# Patient Record
Sex: MALE | Race: BLACK OR AFRICAN AMERICAN | Employment: FULL TIME | URBAN - METROPOLITAN AREA
[De-identification: names, ages, dates, MRNs, and addresses within clinical notes are randomized per-mention and may not be internally consistent; named-entity substitution may affect disease eponyms.]

---

## 2019-03-25 ENCOUNTER — HOSPITAL ENCOUNTER (EMERGENCY)
Age: 49
Discharge: HOME | End: 2019-03-26
Payer: COMMERCIAL

## 2019-03-25 DIAGNOSIS — F10.920 ALCOHOLIC INTOXICATION WITHOUT COMPLICATION (HCC): Primary | ICD-10-CM

## 2019-03-25 LAB
CHP ED QC CHECK: YES
GLUCOSE BLD-MCNC: 84 MG/DL
GLUCOSE BLD-MCNC: 84 MG/DL (ref 70–99)
PERFORMED ON: NORMAL

## 2019-03-25 PROCEDURE — 99284 EMERGENCY DEPT VISIT MOD MDM: CPT

## 2019-03-26 VITALS
DIASTOLIC BLOOD PRESSURE: 102 MMHG | OXYGEN SATURATION: 97 % | HEART RATE: 84 BPM | RESPIRATION RATE: 16 BRPM | SYSTOLIC BLOOD PRESSURE: 140 MMHG | TEMPERATURE: 97.6 F

## 2020-06-08 ENCOUNTER — HOSPITAL ENCOUNTER (EMERGENCY)
Age: 50
Discharge: HOME | End: 2020-06-08
Attending: EMERGENCY MEDICINE
Payer: COMMERCIAL

## 2020-06-08 VITALS
DIASTOLIC BLOOD PRESSURE: 103 MMHG | OXYGEN SATURATION: 97 % | WEIGHT: 188.49 LBS | HEIGHT: 69 IN | HEART RATE: 81 BPM | RESPIRATION RATE: 18 BRPM | TEMPERATURE: 99.5 F | BODY MASS INDEX: 27.92 KG/M2 | SYSTOLIC BLOOD PRESSURE: 167 MMHG

## 2020-06-08 LAB
A/G RATIO: 1.1 (ref 1.1–2.2)
ALBUMIN SERPL-MCNC: 3.8 G/DL (ref 3.4–5)
ALP BLD-CCNC: 63 U/L (ref 40–129)
ALT SERPL-CCNC: 65 U/L (ref 10–40)
ANION GAP SERPL CALCULATED.3IONS-SCNC: 10 MMOL/L (ref 3–16)
AST SERPL-CCNC: 42 U/L (ref 15–37)
BASOPHILS ABSOLUTE: 0 K/UL (ref 0–0.2)
BASOPHILS RELATIVE PERCENT: 0 %
BILIRUB SERPL-MCNC: 0.5 MG/DL (ref 0–1)
BUN BLDV-MCNC: 13 MG/DL (ref 7–20)
CALCIUM SERPL-MCNC: 9.2 MG/DL (ref 8.3–10.6)
CHLORIDE BLD-SCNC: 107 MMOL/L (ref 99–110)
CO2: 26 MMOL/L (ref 21–32)
CREAT SERPL-MCNC: 0.7 MG/DL (ref 0.9–1.3)
EOSINOPHILS ABSOLUTE: 0 K/UL (ref 0–0.6)
EOSINOPHILS RELATIVE PERCENT: 1 %
GFR AFRICAN AMERICAN: >60
GFR NON-AFRICAN AMERICAN: >60
GLOBULIN: 3.4 G/DL
GLUCOSE BLD-MCNC: 94 MG/DL (ref 70–99)
HCT VFR BLD CALC: 40.2 % (ref 40.5–52.5)
HEMOGLOBIN: 13.8 G/DL (ref 13.5–17.5)
LACTIC ACID: 1 MMOL/L (ref 0.4–2)
LIPASE: 28 U/L (ref 13–60)
LYMPHOCYTES ABSOLUTE: 1.9 K/UL (ref 1–5.1)
LYMPHOCYTES RELATIVE PERCENT: 48 %
MCH RBC QN AUTO: 32.9 PG (ref 26–34)
MCHC RBC AUTO-ENTMCNC: 34.4 G/DL (ref 31–36)
MCV RBC AUTO: 95.8 FL (ref 80–100)
MONOCYTES ABSOLUTE: 0.4 K/UL (ref 0–1.3)
MONOCYTES RELATIVE PERCENT: 10 %
NEUTROPHILS ABSOLUTE: 1.6 K/UL (ref 1.7–7.7)
NEUTROPHILS RELATIVE PERCENT: 41 %
PDW BLD-RTO: 14.3 % (ref 12.4–15.4)
PLATELET # BLD: 131 K/UL (ref 135–450)
PLATELET SLIDE REVIEW: ADEQUATE
PMV BLD AUTO: 8.5 FL (ref 5–10.5)
POTASSIUM REFLEX MAGNESIUM: 4.3 MMOL/L (ref 3.5–5.1)
RBC # BLD: 4.2 M/UL (ref 4.2–5.9)
RBC # BLD: NORMAL 10*6/UL
SLIDE REVIEW: ABNORMAL
SODIUM BLD-SCNC: 143 MMOL/L (ref 136–145)
TOTAL PROTEIN: 7.2 G/DL (ref 6.4–8.2)
TROPONIN: <0.01 NG/ML
WBC # BLD: 3.9 K/UL (ref 4–11)

## 2020-06-08 PROCEDURE — 2580000003 HC RX 258: Performed by: EMERGENCY MEDICINE

## 2020-06-08 PROCEDURE — 93005 ELECTROCARDIOGRAM TRACING: CPT | Performed by: EMERGENCY MEDICINE

## 2020-06-08 PROCEDURE — 6360000002 HC RX W HCPCS: Performed by: EMERGENCY MEDICINE

## 2020-06-08 PROCEDURE — 96375 TX/PRO/DX INJ NEW DRUG ADDON: CPT

## 2020-06-08 PROCEDURE — 83605 ASSAY OF LACTIC ACID: CPT

## 2020-06-08 PROCEDURE — 80053 COMPREHEN METABOLIC PANEL: CPT

## 2020-06-08 PROCEDURE — 84484 ASSAY OF TROPONIN QUANT: CPT

## 2020-06-08 PROCEDURE — 85025 COMPLETE CBC W/AUTO DIFF WBC: CPT

## 2020-06-08 PROCEDURE — 2500000003 HC RX 250 WO HCPCS: Performed by: EMERGENCY MEDICINE

## 2020-06-08 PROCEDURE — 96374 THER/PROPH/DIAG INJ IV PUSH: CPT

## 2020-06-08 PROCEDURE — 99284 EMERGENCY DEPT VISIT MOD MDM: CPT

## 2020-06-08 PROCEDURE — 36415 COLL VENOUS BLD VENIPUNCTURE: CPT

## 2020-06-08 PROCEDURE — 83690 ASSAY OF LIPASE: CPT

## 2020-06-08 RX ORDER — ONDANSETRON 2 MG/ML
4 INJECTION INTRAMUSCULAR; INTRAVENOUS ONCE
Status: COMPLETED | OUTPATIENT
Start: 2020-06-08 | End: 2020-06-08

## 2020-06-08 RX ORDER — ONDANSETRON 4 MG/1
4-8 TABLET, ORALLY DISINTEGRATING ORAL EVERY 12 HOURS PRN
Qty: 12 TABLET | Refills: 0 | Status: SHIPPED | OUTPATIENT
Start: 2020-06-08 | End: 2021-03-30

## 2020-06-08 RX ORDER — 0.9 % SODIUM CHLORIDE 0.9 %
1000 INTRAVENOUS SOLUTION INTRAVENOUS ONCE
Status: COMPLETED | OUTPATIENT
Start: 2020-06-08 | End: 2020-06-08

## 2020-06-08 RX ORDER — DICYCLOMINE HYDROCHLORIDE 10 MG/1
10 CAPSULE ORAL EVERY 6 HOURS PRN
Qty: 20 CAPSULE | Refills: 0 | Status: SHIPPED | OUTPATIENT
Start: 2020-06-08 | End: 2021-03-30

## 2020-06-08 RX ORDER — FAMOTIDINE 20 MG/1
20 TABLET, FILM COATED ORAL 2 TIMES DAILY
Qty: 60 TABLET | Refills: 0 | Status: SHIPPED | OUTPATIENT
Start: 2020-06-08 | End: 2021-03-30

## 2020-06-08 RX ADMIN — ONDANSETRON 4 MG: 2 INJECTION, SOLUTION INTRAMUSCULAR; INTRAVENOUS at 17:44

## 2020-06-08 RX ADMIN — FAMOTIDINE 20 MG: 10 INJECTION, SOLUTION INTRAVENOUS at 17:43

## 2020-06-08 RX ADMIN — SODIUM CHLORIDE 1000 ML: 9 INJECTION, SOLUTION INTRAVENOUS at 17:43

## 2020-06-08 ASSESSMENT — PAIN DESCRIPTION - LOCATION
LOCATION: RIB CAGE;ABDOMEN
LOCATION: ABDOMEN;RIB CAGE
LOCATION: ABDOMEN;RIB CAGE

## 2020-06-08 ASSESSMENT — PAIN DESCRIPTION - ORIENTATION: ORIENTATION: UPPER

## 2020-06-08 ASSESSMENT — ENCOUNTER SYMPTOMS
SHORTNESS OF BREATH: 0
VOMITING: 1
COUGH: 0
NAUSEA: 1
EYES NEGATIVE: 1
CONSTIPATION: 0
ABDOMINAL PAIN: 1
DIARRHEA: 1
RESPIRATORY NEGATIVE: 1
BLOOD IN STOOL: 0

## 2020-06-08 ASSESSMENT — PAIN SCALES - GENERAL
PAINLEVEL_OUTOF10: 5
PAINLEVEL_OUTOF10: 3
PAINLEVEL_OUTOF10: 3

## 2020-06-08 ASSESSMENT — PAIN - FUNCTIONAL ASSESSMENT: PAIN_FUNCTIONAL_ASSESSMENT: 0-10

## 2020-06-08 ASSESSMENT — PAIN DESCRIPTION - PAIN TYPE
TYPE: ACUTE PAIN

## 2020-06-08 ASSESSMENT — PAIN DESCRIPTION - DESCRIPTORS: DESCRIPTORS: TENDER

## 2020-06-08 NOTE — ED PROVIDER NOTES
[facility] ENCOUNTER        Pt Name: Cherelle Ludwig  MRN: [id number]  [place] 1970  Date of evaluation: 6/8/2020  Provider: Lane Foreman MD  PCP: No primary care provider on file. CHIEF COMPLAINT       Chief Complaint   Patient presents with    Abdominal Pain     upper abd pain 4/10 x 5days, with diarrhea, nausea, emesis x1 2days ago. hx diverticulitis. HISTORY OFPRESENT ILLNESS   (Location/Symptom, Timing/Onset, Context/Setting, Quality, Duration, Modifying Factors,Severity)  Note limiting factors. Cherelle Ludwig is a 53 y.o. male with history of hypertension, hyperlipidemia, prior episode of diverticulitis, with around 5 days of epigastric discomfort, currently 5 out of 10 with no radiation, associated with nausea and several episodes of vomiting, though no more than 1/day, along with loose stools, several episodes of diarrhea. Vomiting and diarrhea all nonbloody. Prior episodes of diverticulitis cause pain in his lower abdomen, currently more upper abdomen and epigastrium    Denies any urinary symptoms. Has been drinking alcohol more heavily than usual during the quarantine, around 4 of the 12 ounce beers daily. Nursing Notes were all reviewed and agreed with or any disagreements were addressed  in the HPI. REVIEW OF SYSTEMS    (2-9 systems for level 4, 10 or more for level 5)     Review of Systems   Constitutional: Negative for activity change, appetite change, chills and fever. HENT: Negative. Eyes: Negative. Respiratory: Negative. Negative for cough and shortness of breath. Cardiovascular: Negative. Negative for chest pain. Gastrointestinal: Positive for abdominal pain, diarrhea, nausea and vomiting. Negative for blood in stool and constipation. Genitourinary: Negative. Negative for dysuria and flank pain. Musculoskeletal: Negative. Skin: Negative. Neurological: Negative.   Negative for None       SCREENINGS             PHYSICAL EXAM    (up to 7 for level 4, 8 or more for level 5)     ED Triage Vitals [06/08/20 1653]   BP Temp Temp Source Pulse Resp SpO2 Height Weight   (!) 167/103 99.5 °F (37.5 °C) Oral 81 18 97 % 5' 9\" (1.753 m) 188 lb 7.9 oz (85.5 kg)      height is 5' 9\" (1.753 m) and weight is 188 lb 7.9 oz (85.5 kg). His oral temperature is 99.5 °F (37.5 °C). His blood pressure is 167/103 (abnormal) and his pulse is 81. His respiration is 18 and oxygen saturation is 97%. Physical Exam  Constitutional: Appears well-developed and well-nourished. No distress. HENT:   Head: Normocephalic and atraumatic. Eyes: Conjunctivae normal.   Neck: Neck supple. Cardiovascular: Normal rate and intact distal pulses. Extremities warm and well perfused. Cap refill less than 2 seconds. Pulmonary/Chest: Lungs clear to auscultation. Effort normal. No respiratory distress. Speaking full sentences. Abdominal: Nontender, exhibits no distension. Soft. No guarding or rebound. Neurological: Alert. Answering questions appropriately. Ambulatory without ataxia. Musculoskeletal: No lower extremity edema. No grossly visible skin lesions or other deformities. Skin: Skin is warm and dry. No jaundice. Psychiatric: Normal mood and affect. Behavior is normal.   Nursing note and vitals reviewed. DIAGNOSTIC RESULTS   LABS:    Results for orders placed or performed during the hospital encounter of 06/08/20   CBC Auto Differential   Result Value Ref Range    WBC 3.9 (L) 4.0 - 11.0 K/uL    RBC 4.20 4. 20 - 5.90 M/uL    Hemoglobin 13.8 13.5 - 17.5 g/dL    Hematocrit 40.2 (L) 40.5 - 52.5 %    MCV 95.8 80.0 - 100.0 fL    MCH 32.9 26.0 - 34.0 pg    MCHC 34.4 31.0 - 36.0 g/dL    RDW 14.3 12.4 - 15.4 %    Platelets 166 (L) 117 - 450 K/uL    MPV 8.5 5.0 - 10.5 fL    PLATELET SLIDE REVIEW Adequate     SLIDE REVIEW see below     Neutrophils % 41.0 %    Lymphocytes % 48.0 %    Monocytes % 10.0 %    Eosinophils % 06/08/20 1653   BP: (!) 167/103   Pulse: 81   Resp: 18   Temp: 99.5 °F (37.5 °C)   TempSrc: Oral   SpO2: 97%   Weight: 188 lb 7.9 oz (85.5 kg)   Height: 5' 9\" (1.753 m)       Patient was given the following medications:  Medications   0.9 % sodium chloride bolus (1,000 mLs Intravenous New Bag 6/8/20 1743)   ondansetron (ZOFRAN) injection 4 mg (4 mg Intravenous Given 6/8/20 1744)   famotidine (PEPCID) injection 20 mg (20 mg Intravenous Given 6/8/20 1743)            56-year-old male with epigastric discomfort, frequent alcohol use lately. He has had some nausea, vomiting, diarrhea associated. Nontender abdomen, soft, no guarding or rebound. Hypertensive but otherwise normal vitals. Labs remarkable for slight LFT elevations, ALT more than AST but normal bilirubin, normal lipase, normal electrolytes and renal function. Do not feel that imaging is indicated at this time. No evidence of pancreatitis. Suspect gastritis secondary to his alcohol consumption is most likely etiology. He was feeling significantly improved after Zofran, Pepcid here. Will recommend follow-up with PCP after this visit for further evaluation, give return precautions for intractable pain, fevers, inability to tolerate p.o. FINAL IMPRESSION      1. Abdominal pain, epigastric    2. Non-intractable vomiting with nausea, unspecified vomiting type    3. Diarrhea, unspecified type          DISPOSITION/PLAN   DISPOSITION Decision To Discharge 06/08/2020 06:29:35 PM      PATIENT REFERRED TO:  [facility]) Pre-Services  [phone]          DISCHARGE MEDICATIONS:  New Prescriptions    DICYCLOMINE (BENTYL) 10 MG CAPSULE    Take 1 capsule by mouth every 6 hours as needed (cramps)    FAMOTIDINE (PEPCID) 20 MG TABLET    Take 1 tablet by mouth 2 times daily    ONDANSETRON (ZOFRAN ODT) 4 MG DISINTEGRATING TABLET    Take 1-2 tablets by mouth every 12 hours as needed for Nausea May Sub regular tablet (non-ODT) if insurance does not cover ODT.

## 2020-06-09 LAB
EKG ATRIAL RATE: 75 BPM
EKG DIAGNOSIS: NORMAL
EKG P AXIS: 26 DEGREES
EKG P-R INTERVAL: 166 MS
EKG Q-T INTERVAL: 392 MS
EKG QRS DURATION: 102 MS
EKG QTC CALCULATION (BAZETT): 437 MS
EKG R AXIS: -31 DEGREES
EKG T AXIS: 6 DEGREES
EKG VENTRICULAR RATE: 75 BPM

## 2020-06-09 PROCEDURE — 93010 ELECTROCARDIOGRAM REPORT: CPT | Performed by: INTERNAL MEDICINE

## 2021-03-30 ENCOUNTER — HOSPITAL ENCOUNTER (INPATIENT)
Age: 51
LOS: 3 days | Discharge: HOME | DRG: 897 | End: 2021-04-02
Attending: STUDENT IN AN ORGANIZED HEALTH CARE EDUCATION/TRAINING PROGRAM | Admitting: INTERNAL MEDICINE
Payer: COMMERCIAL

## 2021-03-30 DIAGNOSIS — M25.461 EFFUSION OF BURSA OF RIGHT KNEE: ICD-10-CM

## 2021-03-30 DIAGNOSIS — R74.8 ELEVATED LIVER ENZYMES: ICD-10-CM

## 2021-03-30 DIAGNOSIS — F10.10 ALCOHOL ABUSE: Primary | ICD-10-CM

## 2021-03-30 DIAGNOSIS — F10.929 ACUTE ALCOHOLIC INTOXICATION WITH COMPLICATION (HCC): ICD-10-CM

## 2021-03-30 PROBLEM — F10.930 ALCOHOL WITHDRAWAL, UNCOMPLICATED (HCC): Status: ACTIVE | Noted: 2021-03-30

## 2021-03-30 LAB
A/G RATIO: 1 (ref 1.1–2.2)
ACETAMINOPHEN LEVEL: <5 UG/ML (ref 10–30)
ALBUMIN SERPL-MCNC: 4.2 G/DL (ref 3.4–5)
ALP BLD-CCNC: 75 U/L (ref 40–129)
ALT SERPL-CCNC: 277 U/L (ref 10–40)
AMPHETAMINE SCREEN, URINE: NORMAL
ANION GAP SERPL CALCULATED.3IONS-SCNC: 12 MMOL/L (ref 3–16)
AST SERPL-CCNC: 254 U/L (ref 15–37)
BARBITURATE SCREEN URINE: NORMAL
BASOPHILS ABSOLUTE: 0 K/UL (ref 0–0.2)
BASOPHILS RELATIVE PERCENT: 0.9 %
BENZODIAZEPINE SCREEN, URINE: NORMAL
BILIRUB SERPL-MCNC: 0.5 MG/DL (ref 0–1)
BILIRUBIN URINE: NEGATIVE
BLOOD, URINE: NEGATIVE
BUN BLDV-MCNC: 4 MG/DL (ref 7–20)
CALCIUM SERPL-MCNC: 9.3 MG/DL (ref 8.3–10.6)
CANNABINOID SCREEN URINE: NORMAL
CHLORIDE BLD-SCNC: 103 MMOL/L (ref 99–110)
CLARITY: CLEAR
CO2: 28 MMOL/L (ref 21–32)
COCAINE METABOLITE SCREEN URINE: NORMAL
COLOR: YELLOW
CREAT SERPL-MCNC: 0.7 MG/DL (ref 0.9–1.3)
EOSINOPHILS ABSOLUTE: 0.1 K/UL (ref 0–0.6)
EOSINOPHILS RELATIVE PERCENT: 2.2 %
ETHANOL: 298 MG/DL (ref 0–0.08)
FOLATE: 9.77 NG/ML (ref 4.78–24.2)
GFR AFRICAN AMERICAN: >60
GFR NON-AFRICAN AMERICAN: >60
GLOBULIN: 4.1 G/DL
GLUCOSE BLD-MCNC: 93 MG/DL (ref 70–99)
GLUCOSE URINE: NEGATIVE MG/DL
HAV IGM SER IA-ACNC: NORMAL
HCT VFR BLD CALC: 44.4 % (ref 40.5–52.5)
HEMOGLOBIN: 15 G/DL (ref 13.5–17.5)
HEPATITIS B CORE IGM ANTIBODY: NORMAL
HEPATITIS B SURFACE ANTIGEN INTERPRETATION: NORMAL
HEPATITIS C ANTIBODY INTERPRETATION: NORMAL
INR BLD: 1.08 (ref 0.86–1.14)
KETONES, URINE: NEGATIVE MG/DL
LEUKOCYTE ESTERASE, URINE: NEGATIVE
LYMPHOCYTES ABSOLUTE: 1.1 K/UL (ref 1–5.1)
LYMPHOCYTES RELATIVE PERCENT: 44.3 %
Lab: NORMAL
MCH RBC QN AUTO: 32.4 PG (ref 26–34)
MCHC RBC AUTO-ENTMCNC: 33.8 G/DL (ref 31–36)
MCV RBC AUTO: 95.9 FL (ref 80–100)
METHADONE SCREEN, URINE: NORMAL
MICROSCOPIC EXAMINATION: NORMAL
MONOCYTES ABSOLUTE: 0.3 K/UL (ref 0–1.3)
MONOCYTES RELATIVE PERCENT: 13.1 %
NEUTROPHILS ABSOLUTE: 1 K/UL (ref 1.7–7.7)
NEUTROPHILS RELATIVE PERCENT: 39.5 %
NITRITE, URINE: NEGATIVE
OPIATE SCREEN URINE: NORMAL
OXYCODONE URINE: NORMAL
PDW BLD-RTO: 15.1 % (ref 12.4–15.4)
PH UA: 6
PH UA: 6 (ref 5–8)
PHENCYCLIDINE SCREEN URINE: NORMAL
PLATELET # BLD: 89 K/UL (ref 135–450)
PLATELET SLIDE REVIEW: ABNORMAL
PMV BLD AUTO: 7.5 FL (ref 5–10.5)
POTASSIUM REFLEX MAGNESIUM: 4.2 MMOL/L (ref 3.5–5.1)
PROPOXYPHENE SCREEN: NORMAL
PROTEIN UA: NEGATIVE MG/DL
PROTHROMBIN TIME: 12.5 SEC (ref 10–13.2)
RBC # BLD: 4.63 M/UL (ref 4.2–5.9)
SALICYLATE, SERUM: <0.3 MG/DL (ref 15–30)
SARS-COV-2, NAAT: NOT DETECTED
SLIDE REVIEW: ABNORMAL
SODIUM BLD-SCNC: 143 MMOL/L (ref 136–145)
SPECIFIC GRAVITY UA: 1.01 (ref 1–1.03)
TOTAL PROTEIN: 8.3 G/DL (ref 6.4–8.2)
URINE REFLEX TO CULTURE: NORMAL
URINE TYPE: NORMAL
UROBILINOGEN, URINE: 0.2 E.U./DL
VITAMIN B-12: 613 PG/ML (ref 211–911)
WBC # BLD: 2.5 K/UL (ref 4–11)

## 2021-03-30 PROCEDURE — 85610 PROTHROMBIN TIME: CPT

## 2021-03-30 PROCEDURE — 6360000002 HC RX W HCPCS: Performed by: PHYSICIAN ASSISTANT

## 2021-03-30 PROCEDURE — 2580000003 HC RX 258: Performed by: NURSE PRACTITIONER

## 2021-03-30 PROCEDURE — G0378 HOSPITAL OBSERVATION PER HR: HCPCS

## 2021-03-30 PROCEDURE — 80074 ACUTE HEPATITIS PANEL: CPT

## 2021-03-30 PROCEDURE — 82607 VITAMIN B-12: CPT

## 2021-03-30 PROCEDURE — 6370000000 HC RX 637 (ALT 250 FOR IP): Performed by: PHYSICIAN ASSISTANT

## 2021-03-30 PROCEDURE — 99284 EMERGENCY DEPT VISIT MOD MDM: CPT

## 2021-03-30 PROCEDURE — 36415 COLL VENOUS BLD VENIPUNCTURE: CPT

## 2021-03-30 PROCEDURE — 99223 1ST HOSP IP/OBS HIGH 75: CPT | Performed by: PHYSICIAN ASSISTANT

## 2021-03-30 PROCEDURE — 1200000000 HC SEMI PRIVATE

## 2021-03-30 PROCEDURE — 80053 COMPREHEN METABOLIC PANEL: CPT

## 2021-03-30 PROCEDURE — 80307 DRUG TEST PRSMV CHEM ANLYZR: CPT

## 2021-03-30 PROCEDURE — 80143 DRUG ASSAY ACETAMINOPHEN: CPT

## 2021-03-30 PROCEDURE — 6370000000 HC RX 637 (ALT 250 FOR IP): Performed by: NURSE PRACTITIONER

## 2021-03-30 PROCEDURE — 6360000002 HC RX W HCPCS: Performed by: NURSE PRACTITIONER

## 2021-03-30 PROCEDURE — 6370000000 HC RX 637 (ALT 250 FOR IP): Performed by: INTERNAL MEDICINE

## 2021-03-30 PROCEDURE — 96365 THER/PROPH/DIAG IV INF INIT: CPT

## 2021-03-30 PROCEDURE — 2500000003 HC RX 250 WO HCPCS: Performed by: NURSE PRACTITIONER

## 2021-03-30 PROCEDURE — 81003 URINALYSIS AUTO W/O SCOPE: CPT

## 2021-03-30 PROCEDURE — HZ2ZZZZ DETOXIFICATION SERVICES FOR SUBSTANCE ABUSE TREATMENT: ICD-10-PCS | Performed by: INTERNAL MEDICINE

## 2021-03-30 PROCEDURE — 2580000003 HC RX 258: Performed by: PHYSICIAN ASSISTANT

## 2021-03-30 PROCEDURE — 80179 DRUG ASSAY SALICYLATE: CPT

## 2021-03-30 PROCEDURE — 82077 ASSAY SPEC XCP UR&BREATH IA: CPT

## 2021-03-30 PROCEDURE — 85025 COMPLETE CBC W/AUTO DIFF WBC: CPT

## 2021-03-30 PROCEDURE — 87635 SARS-COV-2 COVID-19 AMP PRB: CPT

## 2021-03-30 PROCEDURE — 82746 ASSAY OF FOLIC ACID SERUM: CPT

## 2021-03-30 RX ORDER — SODIUM CHLORIDE 0.9 % (FLUSH) 0.9 %
10 SYRINGE (ML) INJECTION PRN
Status: DISCONTINUED | OUTPATIENT
Start: 2021-03-30 | End: 2021-04-02 | Stop reason: HOSPADM

## 2021-03-30 RX ORDER — ONDANSETRON 2 MG/ML
4 INJECTION INTRAMUSCULAR; INTRAVENOUS EVERY 6 HOURS PRN
Status: DISCONTINUED | OUTPATIENT
Start: 2021-03-30 | End: 2021-04-02 | Stop reason: HOSPADM

## 2021-03-30 RX ORDER — AMLODIPINE BESYLATE 10 MG/1
10 TABLET ORAL DAILY
COMMUNITY
Start: 2021-03-06 | End: 2022-05-02 | Stop reason: SDUPTHER

## 2021-03-30 RX ORDER — LORAZEPAM 2 MG/ML
1 INJECTION INTRAMUSCULAR
Status: DISCONTINUED | OUTPATIENT
Start: 2021-03-30 | End: 2021-04-02 | Stop reason: HOSPADM

## 2021-03-30 RX ORDER — SODIUM CHLORIDE 0.9 % (FLUSH) 0.9 %
10 SYRINGE (ML) INJECTION EVERY 12 HOURS SCHEDULED
Status: DISCONTINUED | OUTPATIENT
Start: 2021-03-30 | End: 2021-04-02 | Stop reason: HOSPADM

## 2021-03-30 RX ORDER — AMLODIPINE BESYLATE 5 MG/1
10 TABLET ORAL DAILY
Status: DISCONTINUED | OUTPATIENT
Start: 2021-03-30 | End: 2021-04-02 | Stop reason: HOSPADM

## 2021-03-30 RX ORDER — SODIUM CHLORIDE 0.9 % (FLUSH) 0.9 %
10 SYRINGE (ML) INJECTION EVERY 12 HOURS SCHEDULED
Status: DISCONTINUED | OUTPATIENT
Start: 2021-03-30 | End: 2021-03-30

## 2021-03-30 RX ORDER — PROMETHAZINE HYDROCHLORIDE 25 MG/1
25 TABLET ORAL 4 TIMES DAILY PRN
COMMUNITY
Start: 2021-03-29 | End: 2022-05-02

## 2021-03-30 RX ORDER — NICOTINE 21 MG/24HR
1 PATCH, TRANSDERMAL 24 HOURS TRANSDERMAL DAILY
Status: DISCONTINUED | OUTPATIENT
Start: 2021-03-30 | End: 2021-03-30

## 2021-03-30 RX ORDER — PROMETHAZINE HYDROCHLORIDE 25 MG/1
12.5 TABLET ORAL EVERY 6 HOURS PRN
Status: DISCONTINUED | OUTPATIENT
Start: 2021-03-30 | End: 2021-04-02 | Stop reason: HOSPADM

## 2021-03-30 RX ORDER — LORAZEPAM 2 MG/1
4 TABLET ORAL
Status: DISCONTINUED | OUTPATIENT
Start: 2021-03-30 | End: 2021-04-02 | Stop reason: HOSPADM

## 2021-03-30 RX ORDER — CLONIDINE HYDROCHLORIDE 0.1 MG/1
0.1 TABLET ORAL 2 TIMES DAILY
Status: DISCONTINUED | OUTPATIENT
Start: 2021-03-30 | End: 2021-04-02 | Stop reason: HOSPADM

## 2021-03-30 RX ORDER — LORAZEPAM 2 MG/ML
3 INJECTION INTRAMUSCULAR
Status: DISCONTINUED | OUTPATIENT
Start: 2021-03-30 | End: 2021-04-02 | Stop reason: HOSPADM

## 2021-03-30 RX ORDER — LANOLIN ALCOHOL/MO/W.PET/CERES
100 CREAM (GRAM) TOPICAL DAILY
Status: DISCONTINUED | OUTPATIENT
Start: 2021-03-30 | End: 2021-04-02 | Stop reason: HOSPADM

## 2021-03-30 RX ORDER — LORAZEPAM 2 MG/ML
4 INJECTION INTRAMUSCULAR
Status: DISCONTINUED | OUTPATIENT
Start: 2021-03-30 | End: 2021-04-02 | Stop reason: HOSPADM

## 2021-03-30 RX ORDER — M-VIT,TX,IRON,MINS/CALC/FOLIC 27MG-0.4MG
1 TABLET ORAL DAILY
Status: DISCONTINUED | OUTPATIENT
Start: 2021-03-30 | End: 2021-04-02 | Stop reason: HOSPADM

## 2021-03-30 RX ORDER — LORAZEPAM 1 MG/1
1 TABLET ORAL
Status: DISCONTINUED | OUTPATIENT
Start: 2021-03-30 | End: 2021-04-02 | Stop reason: HOSPADM

## 2021-03-30 RX ORDER — SODIUM CHLORIDE 9 MG/ML
25 INJECTION, SOLUTION INTRAVENOUS PRN
Status: DISCONTINUED | OUTPATIENT
Start: 2021-03-30 | End: 2021-03-30

## 2021-03-30 RX ORDER — LORAZEPAM 2 MG/1
2 TABLET ORAL
Status: DISCONTINUED | OUTPATIENT
Start: 2021-03-30 | End: 2021-04-02 | Stop reason: HOSPADM

## 2021-03-30 RX ORDER — POLYETHYLENE GLYCOL 3350 17 G/17G
17 POWDER, FOR SOLUTION ORAL DAILY PRN
Status: DISCONTINUED | OUTPATIENT
Start: 2021-03-30 | End: 2021-04-02 | Stop reason: HOSPADM

## 2021-03-30 RX ORDER — SODIUM CHLORIDE 9 MG/ML
25 INJECTION, SOLUTION INTRAVENOUS PRN
Status: DISCONTINUED | OUTPATIENT
Start: 2021-03-30 | End: 2021-04-02 | Stop reason: HOSPADM

## 2021-03-30 RX ORDER — SODIUM CHLORIDE 0.9 % (FLUSH) 0.9 %
10 SYRINGE (ML) INJECTION PRN
Status: DISCONTINUED | OUTPATIENT
Start: 2021-03-30 | End: 2021-03-30

## 2021-03-30 RX ORDER — LORAZEPAM 2 MG/ML
2 INJECTION INTRAMUSCULAR
Status: DISCONTINUED | OUTPATIENT
Start: 2021-03-30 | End: 2021-04-02 | Stop reason: HOSPADM

## 2021-03-30 RX ORDER — 0.9 % SODIUM CHLORIDE 0.9 %
1000 INTRAVENOUS SOLUTION INTRAVENOUS ONCE
Status: COMPLETED | OUTPATIENT
Start: 2021-03-30 | End: 2021-03-30

## 2021-03-30 RX ORDER — NICOTINE 21 MG/24HR
1 PATCH, TRANSDERMAL 24 HOURS TRANSDERMAL DAILY
Status: DISCONTINUED | OUTPATIENT
Start: 2021-03-30 | End: 2021-04-02 | Stop reason: HOSPADM

## 2021-03-30 RX ADMIN — PROMETHAZINE HYDROCHLORIDE 12.5 MG: 25 TABLET ORAL at 21:13

## 2021-03-30 RX ADMIN — FOLIC ACID: 5 INJECTION, SOLUTION INTRAMUSCULAR; INTRAVENOUS; SUBCUTANEOUS at 12:16

## 2021-03-30 RX ADMIN — LORAZEPAM 2 MG: 2 TABLET ORAL at 21:13

## 2021-03-30 RX ADMIN — SODIUM CHLORIDE 1000 ML: 9 INJECTION, SOLUTION INTRAVENOUS at 12:20

## 2021-03-30 RX ADMIN — AMLODIPINE BESYLATE 10 MG: 5 TABLET ORAL at 15:16

## 2021-03-30 RX ADMIN — LORAZEPAM 1 MG: 1 TABLET ORAL at 20:03

## 2021-03-30 RX ADMIN — SODIUM CHLORIDE, PRESERVATIVE FREE 10 ML: 5 INJECTION INTRAVENOUS at 20:03

## 2021-03-30 RX ADMIN — CLONIDINE HYDROCHLORIDE 0.1 MG: 0.1 TABLET ORAL at 17:31

## 2021-03-30 RX ADMIN — Medication 1 TABLET: at 16:22

## 2021-03-30 RX ADMIN — ONDANSETRON HYDROCHLORIDE 4 MG: 2 INJECTION, SOLUTION INTRAMUSCULAR; INTRAVENOUS at 20:03

## 2021-03-30 RX ADMIN — LORAZEPAM 1 MG: 1 TABLET ORAL at 22:39

## 2021-03-30 RX ADMIN — Medication 100 MG: at 16:23

## 2021-03-30 ASSESSMENT — ENCOUNTER SYMPTOMS
ABDOMINAL PAIN: 0
SHORTNESS OF BREATH: 0
COLOR CHANGE: 0
RHINORRHEA: 0
SORE THROAT: 0

## 2021-03-30 NOTE — CARE COORDINATION
Received consult to Social Work for [employer] consideration of rehab\". Spoke with ER provider who stated wait to see pt once admitted as pt just arrived to the ED. CM will see pt once admitted to the floor. Please notify CM if needs or concerns arise.

## 2021-03-30 NOTE — ED NOTES
Nursing care transferred to floor nurse face-to-face on the unit.   Viktoriya Rubio, RN     Viktoriya Rubio RN  03/30/21 5701

## 2021-03-30 NOTE — PROGRESS NOTES
Patient admitted to room __231__ from ER. Patient oriented to room, call light, bed rails, phone, lights and bathroom. Patient instructed about the schedule of the day including: vital sign frequency, lab draws, possible tests, frequency of MD and staff rounds, daily weights, I &O's and prescribed diet. Bed alarm deferred patient low fall risk and refuses alarm. Bed locked, in lowest position, side rails up 2/4, call light within reach. Recliner Assessment:     Patient is able to demonstrate the ability to move from a reclining position to an upright position within the recliner. 4 Eyes Skin Assessment:     The patient is being assess for   Admission    I agree that 2 RN's have performed a thorough Head to Toe Skin Assessment on the patient. ALL assessment sites listed below have been assessed. Areas assessed by both nurses:   [x]   Head, Face, and Ears   [x]   Shoulders, Back, and Chest, Abdomen  [x]   Arms, Elbows, and Hands   []   Coccyx, Sacrum, and Ischium  []   Legs, Feet, and Heels  Patient refused skin assessment other than above and refused another nurse to assess skin. **SHARE this note so that the co-signing nurse is able to place an eSignature**    Co-signer eSignature: Electronically signed by Sachin Jauregui RN on 3/30/21 at 4:04 PM EDT    Does the Patient have Skin Breakdown?   No          Isaias Prevention initiated:  No   Wound Care Orders initiated:  No      [facility] nurse consulted for Pressure Injury (Stage 3,4, Unstageable, DTI, NWPT, Complex wounds)and New or Established Ostomies:  No      Primary Nurse eSignature: Electronically signed by Sachin Jauregui RN on 3/30/21 at 4:05 PM EDT

## 2021-03-30 NOTE — ED PROVIDER NOTES
using speech recognition software and may contain errors related to that system including errors in grammar, punctuation, and spelling, as well as words and phrases that may be inappropriate. If there are any questions or concerns please feel free to contact the dictating provider for clarification.          Randall Ace,   03/30/21 2227

## 2021-03-30 NOTE — CARE COORDINATION
Case Management Assessment  Initial Evaluation      Patient Name: Lucy Ramirez  YOB: 1970  Diagnosis: Alcohol withdrawal, uncomplicated ([facility].) [D08.061]  Date / Time: 3/30/2021 10:37 AM    Admission status/Date:03/30/2021 Inpatient   Chart Reviewed: Yes. Order to Social work for [employer] consideration of rehab\"      Patient Interviewed: Yes   Family Interviewed:  No      Hospitalization in the last 30 days:  No      Health Care Decision Maker : pt did not address      Met with: pt   Interview conducted  (bedside/phone): bedside    Current PCP: none; information provided for the [facility] at [address] [place] [address] required for SNF : Y          3 night stay required -  [employer]  Support Systems/Care Needs:    Transportation: self    Meal Preparation: self    Housing  Living Arrangements: Pt stated he is homeless and living in his car. Steps: none  Intent for return to present living arrangements: Yes  Identified Issues: homeless; pt not open to a homeless [facility] with [address] : No Agency:(Services)     Passport/Waiver : No  :                      Phone Number:    Passport/Waiver Services: n/a          Durable Medical Equiptment   DME Provider:   Equipment:   Walker___Cane___RTS___ BSC___Shower Chair___Hospital Bed___W/C____Other________  02 at ____Liter(s)---wears(frequency)_______ HHN ___ CPAP___ BiPap___   N/A__x__      Home O2 Use :  No    If No for home O2---if presently on O2 during hospitalization:  No  if yes CM to follow for potential DC O2 need  Informed of need for care provider to bring portable home O2 tank on day of discharge for nursing to connect prior to leaving:   Not Indicated  Verbalized agreement/Understanding:   Not Indicated    Community Service Affiliation  Dialysis:  No    · Agency:  · Location:  · Dialysis Schedule:  · Phone:   · Fax:     Other Community Services: Pt stated he was just enrolled in services with [facility]    DISCHARGE PLAN: Explained Case Management role/services. Chart review completed. Met with pt at bedside. Pt stated he is independent. He stated he is homeless. He stated that he is going to talk to his sister to see if he can stay with her for a little. Inquired if pt was open to homeless shelters and he stated \"not really\". Pt stated he plans on continuing services with [facility]. Pt was open to the [employer]; placed on his bedside table per his request. This folder has homeless shelter information in it as well. Pt aware that CM can assist with referrals once he makes the initial calls and him signing a release of information. Pt also aware if he wants us to talk to [facility], he would need to sign a release of information. He stated agreement and denied needing or wanting assistance from CM at this time for resources/referrals. CM will follow. Please notify CM if needs or concerns arise.

## 2021-03-30 NOTE — ED PROVIDER NOTES
[address] ED  EMERGENCY DEPARTMENT ENCOUNTER        Pt Name: Dalila López  MRN: [id number]  [place] 1970  Dateof evaluation: 3/30/2021  Provider: YANN Xiao CNP  PCP: No primary care provider on file. ED Attending: No att. providers found    [facility]       Chief Complaint   Patient presents with    Drug / Alcohol Assessment     sent by [facility] for alcohol detox, daily beer drinker x 37 years, 60-70 oz daily, last drink approx 0700       HISTORY OF PRESENTILLNESS   (Location/Symptom, Timing/Onset, Context/Setting, Quality, Duration, Modifying Factors, Severity)  Note limiting factors. Dalila López is a 53 y.o. male for alcohol detox. Onset was today. Context includes patient [facility]. Patient denies being homicidal or suicidal.  Patient reports that he drinks 60-70 beers a day and has drank for the last 37 years. He reports that he last drink this morning. Patient is requesting help. Alleviating factors include nothing. Aggravating factors include nothing. Pain is 0/10. Nothing has been used for pain today. Nursing Notes were all reviewed and agreed with or any disagreements were addressed  in the HPI. REVIEW OF SYSTEMS    (2-9 systems for level 4, 10 or more for level 5)     Review of Systems   Constitutional: Negative for fever. HENT: Negative for congestion, rhinorrhea and sore throat. Respiratory: Negative for shortness of breath. Cardiovascular: Negative for chest pain. Gastrointestinal: Negative for abdominal pain. Genitourinary: Negative for decreased urine volume and difficulty urinating. Musculoskeletal: Negative for arthralgias and myalgias. Skin: Negative for color change and rash. Neurological: Negative for dizziness and light-headedness. Psychiatric/Behavioral: Negative for agitation, self-injury and suicidal ideas.    All other systems reviewed and are negative. Positives and Pertinent negatives as per HPI. Except as noted above in the ROS, all other systems were reviewed and negative. PAST MEDICAL HISTORY     Past Medical History:   Diagnosis Date    Alcohol abuse     Depression     Diverticulitis     Hyperlipidemia     Hypertension          SURGICAL HISTORY       Past Surgical History:   Procedure Laterality Date    COLECTOMY      HAND SURGERY Right 9/2/14    Middle finger revision amputation         CURRENT MEDICATIONS       Previous Medications    AMLODIPINE (NORVASC) 5 MG TABLET    Take 5 mg by mouth daily. DICYCLOMINE (BENTYL) 10 MG CAPSULE    Take 1 capsule by mouth every 6 hours as needed (cramps)    FAMOTIDINE (PEPCID) 20 MG TABLET    Take 1 tablet by mouth 2 times daily    ONDANSETRON (ZOFRAN ODT) 4 MG DISINTEGRATING TABLET    Take 1-2 tablets by mouth every 12 hours as needed for Nausea May Sub regular tablet (non-ODT) if insurance does not cover ODT. ALLERGIES     Patient has no known allergies. FAMILY HISTORY     History reviewed. No pertinent family history.        SOCIAL HISTORY       Social History     Socioeconomic History    Marital status: Single     Spouse name: None    Number of children: 2    Years of education: None    Highest education level: None   Occupational History    None   Social Needs    Financial resource strain: None    Food insecurity     Worry: None     Inability: None    Transportation needs     Medical: None     Non-medical: None   Tobacco Use    Smoking status: Current Every Day Smoker     Packs/day: 0.50     Types: Cigarettes    Smokeless tobacco: Never Used   Substance and Sexual Activity    Alcohol use: Yes     Comment: 60-70 ounces of beer per day    Drug use: No    Sexual activity: None   Lifestyle    Physical activity     Days per week: None     Minutes per session: None    Stress: None   Relationships    Social connections     Talks on phone: None     Gets together: None Attends Mandaeism service: None     Active member of club or organization: None     Attends meetings of clubs or organizations: None     Relationship status: None    Intimate partner violence     Fear of current or ex partner: None     Emotionally abused: None     Physically abused: None     Forced sexual activity: None   Other Topics Concern    None   Social History Narrative    None       SCREENINGS    Roselia Coma Scale  Eye Opening: Spontaneous  Best Verbal Response: Oriented  Best Motor Response: Obeys commands  Roselia Coma Scale Score: 15        PHYSICAL EXAM  (up to 7 for level 4, 8 or more for level 5)     ED Triage Vitals [03/30/21 1035]   BP Temp Temp Source Pulse Resp SpO2 Height Weight   (!) 167/108 97.2 °F (36.2 °C) Oral 96 18 99 % 5' 9\" (1.753 m) 185 lb (83.9 kg)       Physical Exam  Constitutional:       Appearance: He is well-developed. HENT:      Head: Normocephalic and atraumatic. Neck:      Musculoskeletal: Normal range of motion. Cardiovascular:      Rate and Rhythm: Normal rate. Pulmonary:      Effort: Pulmonary effort is normal. No respiratory distress. Abdominal:      General: There is no distension. Palpations: Abdomen is soft. Musculoskeletal: Normal range of motion. Skin:     General: Skin is warm and dry. Neurological:      Mental Status: He is alert and oriented to person, place, and time. Psychiatric:         Mood and Affect: Mood is depressed. Affect is flat. Speech: Speech normal.         Behavior: Behavior is withdrawn. Behavior is cooperative. Thought Content: Thought content does not include homicidal or suicidal ideation. Thought content does not include homicidal or suicidal plan.          DIAGNOSTIC RESULTS   LABS:    Labs Reviewed   CBC WITH AUTO DIFFERENTIAL - Abnormal; Notable for the following components:       Result Value    WBC 2.5 (*)     Platelets 89 (*)     Neutrophils Absolute 1.0 (*)     All other components within normal limits    Narrative:     Performed at:  [facility],  [place], [employer]   Phone [phone]   COMPREHENSIVE METABOLIC PANEL W/ REFLEX TO MG FOR LOW K - Abnormal; Notable for the following components:    BUN 4 (*)     CREATININE 0.7 (*)     Total Protein 8.3 (*)     Albumin/Globulin Ratio 1.0 (*)      (*)      (*)     All other components within normal limits    Narrative:     Performed at:  [facility],  [place], [facility]   Phone [phone]   ACETAMINOPHEN LEVEL - Abnormal; Notable for the following components:    Acetaminophen Level <5 (*)     All other components within normal limits    Narrative:     Performed at:  [facility],  [place], [facility]   Phone [phone]   SALICYLATE LEVEL - Abnormal; Notable for the following components:    Salicylate, Serum <9.7 (*)     All other components within normal limits    Narrative:     Performed at:  [facility],  [place], [place] [employer]   Phone [phone]   ETHANOL    Narrative:     Performed at:  [facility],  [place], [employer]   Phone [phone]   PROTIME-INR    Narrative:     Performed at:  [facility],  [place], [place] [employer]   Phone [phone]   URINE RT REFLEX TO CULTURE   URINE DRUG SCREEN       All other labs werewithin normal range or not returned as of this dictation. EKG: All EKG's are interpreted by the Emergency Department Physician who either signs or Co-signs this chart in the absence of a cardiologist.  Please see their note for interpretation of EKG. RADIOLOGY:           Interpretation per the Radiologist below, if available at the time of this note:    No orders to display     No results found.       PROCEDURES Unless otherwise noted below, none     Procedures     CRITICAL CARE TIME   N/A    CONSULTS:  IP CONSULT TO SOCIAL WORK      EMERGENCYDEPARTMENT COURSE and DIFFERENTIAL DIAGNOSIS/MDM:   Vitals:    Vitals:    03/30/21 1035   BP: (!) 167/108   Pulse: 96   Resp: 18   Temp: 97.2 °F (36.2 °C)   TempSrc: Oral   SpO2: 99%   Weight: 185 lb (83.9 kg)   Height: 5' 9\" (1.753 m)       Patient was given the following medications:  Medications   sodium chloride flush 0.9 % injection 10 mL (has no administration in time range)   sodium chloride flush 0.9 % injection 10 mL (has no administration in time range)   0.9 % sodium chloride infusion (has no administration in time range)   0.9 % sodium chloride bolus (1,000 mLs Intravenous New Bag 3/30/21 1220)   sodium chloride 0.9 % 252 mL with folic acid 1 mg, adult multi-vitamin with vitamin k 10 mL, thiamine 100 mg ( Intravenous New Bag 3/30/21 1216)       Patient was seen and evaluated by myself and Dr. Liv Hawkins. Patient here today for concerns for alcohol detox. Patient was sent over from [facility]. Patient sought help from [facility] for alcohol detox however [facility] recommended the patient be inpatient to receive his detox so he arrived to [facility] to be evaluated. On arrival patient is found to be awake and alert patient was found to be hypertensive. Patient was provided with IV fluids and a banana bag. Patient reports that he last drank at 7 AM today. He reports that he drinks 60 to 70 ounces of beer a day. He denies any history of seizures. Lab values have all been reviewed and interpreted. Consult was placed to the hospitalist for admission. Hospitalist is accepted. Patient's care was transferred to the inpatient unit. The patient tolerated their visit well. I have evaluated this patient. My supervising physician was available for consultation.  The patient and / or the family were informed of the results of any tests, a time was given to answer questions, a plan was proposed and they agreed with plan. FINAL IMPRESSION      1. Alcohol abuse    2. Acute alcoholic intoxication with complication (HCC)    3. Elevated liver enzymes          DISPOSITION/PLAN   DISPOSITION Decision To Admit 03/30/2021 11:26:24 AM      PATIENT REFERRED TO:  No follow-up provider specified.     DISCHARGE MEDICATIONS:  New Prescriptions    No medications on file       DISCONTINUED MEDICATIONS:  Discontinued Medications    No medications on file              (Please note that portions of this note were completed with a voice recognition program.  Efforts were made to edit the dictations but occasionally words are mis-transcribed.)    YANN Guzmán CNP (electronically signed)          YANN Guzmán CNP  03/30/21 7583

## 2021-03-30 NOTE — H&P
Hospital Medicine History & Physical      PCP: No primary care provider on file. Date of Admission: 3/30/2021    Date of Service: Pt seen/examined on 3/30/2021     Chief Complaint:    Chief Complaint   Patient presents with    Drug / Alcohol Assessment     sent by [facility] for alcohol detox, daily beer drinker x 37 years, 60-70 oz daily, last drink approx 0700         History Of Present Illness: The patient is a 53 y.o. male with HTN, alcohol dependence (abusing alcohol on and off x 37 years) who presented to [facility] ED with complaint of alcohol problem, request for detox. For the past 2 weeks he has been drinking 6-7 24oz beers per day. Prior to two weeks ago he was sober for about a month. He has been trying to quit drinking on his own but has been unsuccessful. He went to [facility] today and was directed to the hospital for detox due to the quantity of his typical daily alcohol intake. His last drink was 0700 this AM.  He is asymptomatic form a withdrawal standpoint currently. Typically when he goes through withdrawal he only experiences tremors. He denies a history of severe withdrawal or DTs    He denies current drug use. Previously used crack cocaine- last use 3 weeks ago  He has never used IV drugs   He smokes 1/2 ppd    Past Medical History:        Diagnosis Date    Alcohol abuse     Depression     Diverticulitis     Hyperlipidemia     Hypertension        Past Surgical History:        Procedure Laterality Date    COLECTOMY      HAND SURGERY Right 9/2/14    Middle finger revision amputation       Medications Prior to Admission:    Prior to Admission medications    Medication Sig Start Date End Date Taking? Authorizing Provider   amLODIPine (NORVASC) 5 MG tablet Take 10 mg by mouth daily    Yes Historical Provider, MD       Allergies:  Patient has no known allergies. Social History:  The patient currently is homeless- living out of his car.   He is unemployed    TOBACCO:   reports that APTT in the last 72 hours. UA:  Recent Labs     03/30/21  1355   COLORU Yellow   PHUR 6.0  6.0   CLARITYU Clear   SPECGRAV 1.010   LEUKOCYTESUR Negative   UROBILINOGEN 0.2   BILIRUBINUR Negative   BLOODU Negative   GLUCOSEU Negative          CARDIAC ENZYMES  No results for input(s): CKTOTAL, CKMB, CKMBINDEX, TROPONINI in the last 72 hours.     U/A:    Lab Results   Component Value Date    COLORU Yellow 03/30/2021    CLARITYU Clear 03/30/2021    SPECGRAV 1.010 03/30/2021    LEUKOCYTESUR Negative 03/30/2021    BLOODU Negative 03/30/2021    GLUCOSEU Negative 03/30/2021       ABG  No results found for: VRE8EFU, BEART, L8NNDXXT, PHART, THGBART, HCI7GOY, PO2ART, OWP3OXC    CULTURES  COVID 19, rapid: not detected     EKG:  I have reviewed the EKG with the following interpretation:   None     RADIOLOGY  No orders to display       ASSESSMENT/PLAN:      #Alcohol dependence with impending withdrawal  - last drink was 0700 on 3/30- ethanol level 298 in ER  - CIWA protocol with ativan PRN  - seizure and fall precautions  - CM for rehab resources - he was in contact with Donnell Pratt before coming to ER     #Leukopenia  #Thrombocytopenia   - chronic and noted on labs 6/2020  - unclear etiology- could be BM suppression related to longstanding alcohol abuse  - will need further work up- can be completed as OP- he stated understanding     #Transaminitis   - suspect underling liver disease related to long standing alcohol abuse  - Check acute hepatitis panel  - repeat labs in AM    #HTN- uncontrolled   - has been off norvasc for at least a week- resume norvasc 10 mg daily and monitor for improvement     # Cocaine abuse   - last use ~3 weeks ago- recommended cessation    DVT Prophylaxis: SCD 2/2 low plt count   Diet: General diet   Code Status: Full code    Eddie Aleman PA-C  3/30/2021 4:31 PM

## 2021-03-31 PROBLEM — M25.461 EFFUSION OF RIGHT KNEE: Status: ACTIVE | Noted: 2021-03-31

## 2021-03-31 LAB
A/G RATIO: 1 (ref 1.1–2.2)
ALBUMIN SERPL-MCNC: 3.6 G/DL (ref 3.4–5)
ALP BLD-CCNC: 91 U/L (ref 40–129)
ALT SERPL-CCNC: 202 U/L (ref 10–40)
ANION GAP SERPL CALCULATED.3IONS-SCNC: 10 MMOL/L (ref 3–16)
AST SERPL-CCNC: 165 U/L (ref 15–37)
BASOPHILS ABSOLUTE: 0 K/UL (ref 0–0.2)
BASOPHILS RELATIVE PERCENT: 1.1 %
BILIRUB SERPL-MCNC: 0.6 MG/DL (ref 0–1)
BUN BLDV-MCNC: 8 MG/DL (ref 7–20)
CALCIUM SERPL-MCNC: 9.1 MG/DL (ref 8.3–10.6)
CHLORIDE BLD-SCNC: 96 MMOL/L (ref 99–110)
CO2: 29 MMOL/L (ref 21–32)
CREAT SERPL-MCNC: 0.8 MG/DL (ref 0.9–1.3)
EOSINOPHILS ABSOLUTE: 0.1 K/UL (ref 0–0.6)
EOSINOPHILS RELATIVE PERCENT: 2.5 %
GFR AFRICAN AMERICAN: >60
GFR NON-AFRICAN AMERICAN: >60
GLOBULIN: 3.6 G/DL
GLUCOSE BLD-MCNC: 115 MG/DL (ref 70–99)
HCT VFR BLD CALC: 40.2 % (ref 40.5–52.5)
HEMOGLOBIN: 13.7 G/DL (ref 13.5–17.5)
LYMPHOCYTES ABSOLUTE: 1.1 K/UL (ref 1–5.1)
LYMPHOCYTES RELATIVE PERCENT: 38.2 %
MCH RBC QN AUTO: 32.6 PG (ref 26–34)
MCHC RBC AUTO-ENTMCNC: 34.1 G/DL (ref 31–36)
MCV RBC AUTO: 95.6 FL (ref 80–100)
MONOCYTES ABSOLUTE: 0.3 K/UL (ref 0–1.3)
MONOCYTES RELATIVE PERCENT: 10.4 %
NEUTROPHILS ABSOLUTE: 1.3 K/UL (ref 1.7–7.7)
NEUTROPHILS RELATIVE PERCENT: 47.8 %
PDW BLD-RTO: 15.1 % (ref 12.4–15.4)
PLATELET # BLD: 78 K/UL (ref 135–450)
PMV BLD AUTO: 7.8 FL (ref 5–10.5)
POTASSIUM REFLEX MAGNESIUM: 3.8 MMOL/L (ref 3.5–5.1)
RBC # BLD: 4.2 M/UL (ref 4.2–5.9)
SODIUM BLD-SCNC: 135 MMOL/L (ref 136–145)
TOTAL PROTEIN: 7.2 G/DL (ref 6.4–8.2)
WBC # BLD: 2.8 K/UL (ref 4–11)

## 2021-03-31 PROCEDURE — 99232 SBSQ HOSP IP/OBS MODERATE 35: CPT | Performed by: PHYSICIAN ASSISTANT

## 2021-03-31 PROCEDURE — G0378 HOSPITAL OBSERVATION PER HR: HCPCS

## 2021-03-31 PROCEDURE — 2580000003 HC RX 258: Performed by: PHYSICIAN ASSISTANT

## 2021-03-31 PROCEDURE — 85025 COMPLETE CBC W/AUTO DIFF WBC: CPT

## 2021-03-31 PROCEDURE — 80053 COMPREHEN METABOLIC PANEL: CPT

## 2021-03-31 PROCEDURE — 2580000003 HC RX 258: Performed by: NURSE PRACTITIONER

## 2021-03-31 PROCEDURE — 6370000000 HC RX 637 (ALT 250 FOR IP): Performed by: NURSE PRACTITIONER

## 2021-03-31 PROCEDURE — 6370000000 HC RX 637 (ALT 250 FOR IP): Performed by: PHYSICIAN ASSISTANT

## 2021-03-31 PROCEDURE — 36415 COLL VENOUS BLD VENIPUNCTURE: CPT

## 2021-03-31 PROCEDURE — 1200000000 HC SEMI PRIVATE

## 2021-03-31 PROCEDURE — 6370000000 HC RX 637 (ALT 250 FOR IP): Performed by: INTERNAL MEDICINE

## 2021-03-31 PROCEDURE — 6360000002 HC RX W HCPCS: Performed by: PHYSICIAN ASSISTANT

## 2021-03-31 RX ORDER — SODIUM CHLORIDE 9 MG/ML
INJECTION, SOLUTION INTRAVENOUS CONTINUOUS
Status: DISPENSED | OUTPATIENT
Start: 2021-03-31 | End: 2021-03-31

## 2021-03-31 RX ORDER — LOPERAMIDE HYDROCHLORIDE 2 MG/1
2 CAPSULE ORAL 4 TIMES DAILY PRN
Status: DISCONTINUED | OUTPATIENT
Start: 2021-03-31 | End: 2021-04-01

## 2021-03-31 RX ORDER — IBUPROFEN 400 MG/1
400 TABLET ORAL EVERY 6 HOURS PRN
Status: DISCONTINUED | OUTPATIENT
Start: 2021-03-31 | End: 2021-04-02 | Stop reason: HOSPADM

## 2021-03-31 RX ADMIN — SODIUM CHLORIDE, PRESERVATIVE FREE 10 ML: 5 INJECTION INTRAVENOUS at 08:59

## 2021-03-31 RX ADMIN — AMLODIPINE BESYLATE 10 MG: 5 TABLET ORAL at 08:59

## 2021-03-31 RX ADMIN — SODIUM CHLORIDE: 9 INJECTION, SOLUTION INTRAVENOUS at 13:51

## 2021-03-31 RX ADMIN — CLONIDINE HYDROCHLORIDE 0.1 MG: 0.1 TABLET ORAL at 08:59

## 2021-03-31 RX ADMIN — SODIUM CHLORIDE: 9 INJECTION, SOLUTION INTRAVENOUS at 23:02

## 2021-03-31 RX ADMIN — LORAZEPAM 1 MG: 1 TABLET ORAL at 04:01

## 2021-03-31 RX ADMIN — PROMETHAZINE HYDROCHLORIDE 12.5 MG: 25 TABLET ORAL at 04:01

## 2021-03-31 RX ADMIN — IBUPROFEN 400 MG: 400 TABLET, FILM COATED ORAL at 15:10

## 2021-03-31 RX ADMIN — LORAZEPAM 1 MG: 1 TABLET ORAL at 13:50

## 2021-03-31 RX ADMIN — LORAZEPAM 1 MG: 1 TABLET ORAL at 23:05

## 2021-03-31 RX ADMIN — Medication 1 TABLET: at 08:59

## 2021-03-31 RX ADMIN — Medication 100 MG: at 08:59

## 2021-03-31 RX ADMIN — LOPERAMIDE HYDROCHLORIDE 2 MG: 2 CAPSULE ORAL at 13:51

## 2021-03-31 RX ADMIN — ONDANSETRON HYDROCHLORIDE 4 MG: 2 INJECTION, SOLUTION INTRAMUSCULAR; INTRAVENOUS at 13:50

## 2021-03-31 RX ADMIN — CLONIDINE HYDROCHLORIDE 0.1 MG: 0.1 TABLET ORAL at 23:02

## 2021-03-31 ASSESSMENT — PAIN SCALES - GENERAL: PAINLEVEL_OUTOF10: 4

## 2021-03-31 NOTE — CARE COORDINATION
INTERDISCIPLINARY PLAN OF CARE CONFERENCE    Date/Time: 3/31/2021 10:28 AM  Completed by: JADON Soliz. Case Management      Patient Name:  Rickey Solomon  YOB: 1970  Admitting Diagnosis: Alcohol withdrawal, uncomplicated ([facility] [address] [D13.315]     Admit Date/Time:  3/30/2021 10:37 AM    Chart reviewed. Interdisciplinary team contacted or reviewed plan related to patient progress and discharge plans. Disciplines included Case Management, Nursing, and Dietitian. Current Status: Ongoing   PT/OT recommendation for discharge plan of care: n/a    Expected D/C Disposition: To his car  Confirmed plan with patient and/or family Yes confirmed with: (name) pt  Met with:pt    Discharge Plan Comments: Chart review completed. Met with pt at bedside. He stated he had not looked at the [facility]. Inquired if pt was open to homeless shelters as he stated no yesterday. Pt was unclear about this. Encouraged him to review the folder and call homeless shelters if he was open to this. Pt stated he would likely return to his car. Inquired what writer could assist with at this time and he declined needing any assistance from CM. Pt aware that CM can assist with referrals after he makes the initial calls and him signing a release of information. He stated agreement and denied assistance. Home O2 in place on admit: No    CM will follow. Please notify CM if needs or concerns arise.

## 2021-03-31 NOTE — PROGRESS NOTES
Pt in bed with eyes closed. Pt was facing he door when RN entered the room, and the Pt did not open his eyes when the door was opened. RN observed chest rise and fall .

## 2021-03-31 NOTE — PROGRESS NOTES
Progress Note    Admit Date:  3/30/2021    Subjective:  Mr. Mariana Avila of tremors, nausea, vomiting, and diarrhea  Clonidine added last evening for BP  He has received 5 mg of ativan over the past 24 hours      Objective:     BP (!) 147/93   Pulse 102   Temp 98.7 °F (37.1 °C) (Oral)   Resp 18   Ht 5' 9\" (1.753 m)   Wt 185 lb (83.9 kg)   SpO2 96%   BMI 27.32 kg/m²       Intake/Output Summary (Last 24 hours) at 3/31/2021 1344  Last data filed at 3/31/2021 0917  Gross per 24 hour   Intake 490 ml   Output 800 ml   Net -310 ml       Physical Exam:  Gen: No distress. Alert. Eyes: PERRL. No sclera icterus. No conjunctival injection. ENT: No discharge. Pharynx clear. Neck: No JVD. No Carotid Bruit. Trachea midline. Resp: No accessory muscle use. No crackles. No wheezes. No rhonchi. CV: Regular rate. Regular rhythm. No murmur. No rub. No edema. GI: Non-tender. Non-distended. No masses. No organomegaly. Normal bowel sounds. No hernia. Skin: Warm and dry. No nodule on exposed extremities. No rash on exposed extremities. M/S: No cyanosis. No joint deformity. No clubbing.   + right knee effusion. No erythema or warmth, full AROM at R knee joint without pain   Neuro: Awake. Grossly nonfocal    + Tremors   Psych: Oriented x 3. No anxiety or agitation.      Scheduled Meds:   sodium chloride flush  10 mL Intravenous 2 times per day    multivitamin  1 tablet Oral Daily    thiamine  100 mg Oral Daily    amLODIPine  10 mg Oral Daily    nicotine  1 patch Transdermal Daily    cloNIDine  0.1 mg Oral BID       Continuous Infusions:   sodium chloride      sodium chloride         PRN Meds:  loperamide, sodium chloride flush, sodium chloride, promethazine **OR** ondansetron, polyethylene glycol, LORazepam **OR** LORazepam **OR** LORazepam **OR** LORazepam **OR** LORazepam **OR** LORazepam **OR** LORazepam **OR** LORazepam      Data:  CBC:   Recent Labs     03/30/21  1152 03/31/21  0536   WBC 2.5* 2.8*

## 2021-03-31 NOTE — PROGRESS NOTES
Pt has been educated to hospital falls prevention policy. They are aware they will be assessed every shift, and with any condition changes, by the nursing staff on their ability to perform their ADL's without need for assistance. Pt understands that based on the number of their score they are given a base score that will assign a level of low, medium, or high risk for falls. This patient has rated a high which requires a bed / chair alarm for their safety to prevent a fall. The patient is alert and oriented, and acknowledges and understands the need for intervention but refuses the application and use of the bed / chair alarm that is required per policy. Pt agrees to use call light and wait for help to arrive to assist them to get up when they need to. Call light is within reach and all other safety measures in place.   Kiya Cardenas RN

## 2021-03-31 NOTE — PLAN OF CARE
Nutrition Problem #1: Predicted inadequate energy intake  Intervention: Food and/or Nutrient Delivery: Continue Current Diet, Start Oral Nutrition Supplement  Nutritional Goals: Pt will consume >75% of general diet x3 meals/day + he will consume 50% or greater of Ensure high-protein with breakfast and dinner meals

## 2021-03-31 NOTE — PLAN OF CARE
Problem: Safety:  Goal: Free from accidental physical injury  Description: Free from accidental physical injury  Outcome: Ongoing     Problem: Pain:  Goal: Patient's pain/discomfort is manageable  Description: Patient's pain/discomfort is manageable  Outcome: Ongoing     Problem: Discharge Planning:  Goal: Patients continuum of care needs are met  Description: Patients continuum of care needs are met  Outcome: Ongoing

## 2021-03-31 NOTE — PROGRESS NOTES
Assessment complete. meds passed. CIWA 5. Not scored high enough for ativan. Pt has been sleeping throughout the day, and always has his eyes closed when he has been checked on.     1120 CIWA 2. Pt asleep in bed, denies current needs. Pt carried on the whole conversation with his eyes closed.

## 2021-03-31 NOTE — FLOWSHEET NOTE
03/30/21 1934   Vital Signs   Temp 97.2 °F (36.2 °C)   Temp Source Oral   Pulse 121   Heart Rate Source Monitor   Resp 18   BP (!) 163/97   BP Location Left upper arm   Patient Position Semi fowlers   Level of Consciousness Alert (0)   MEWS Score 3   Oxygen Therapy   SpO2 96 %   O2 Device None (Room air)   Assessment complete, see flowsheets. Pt resting in bed at this time, anxious, worried about detox process; pt educated to expected POC, verbalizes understanding. PRN medication given per pt request and PRN order parameters- CIWA score currently 10. Pt denies further needs at this time, call light within reach, bed alarm in place. Will continue to monitor.   Maame Corral RN

## 2021-03-31 NOTE — PROGRESS NOTES
Comprehensive Nutrition Assessment    Type and Reason for Visit:  Initial, Positive Nutrition Screen(MST = 3; poor dentition, wt loss, poor appetite)    Nutrition Recommendations/Plan:   1. Continue general diet order. 2. Added Ensure high-protein with breakfast and dinner meals. 3. Monitor appetite, meal intake, and acceptance/intake of ONS. 4. Please obtain an actual, current weight for this patient - only a stated weight was obtained upon admission. 5. Monitor nutrition-related labs, alcohol detox, bowel function, and weight trends. Nutrition Assessment:  Pt is nutritionally compromised AEB poor dentition, chronic alcohol abuse x 37 years, poor po intake pta, homeless PTA, and altered nutrition-related labs, however, pt is improving from a nutritional standpoint AEB he is consuming % of meals during admission; will continue current general diet order and add Ensure high-protein with breakfast and dinner meals    Malnutrition Assessment:  Malnutrition Status: At risk for malnutrition    Context:  Social/Environmental Circumstances     Findings of the 6 clinical characteristics of malnutrition:  Energy Intake:  Mild decrease in energy intake (Comment)(PTA)  Weight Loss:  No significant weight loss     Body Fat Loss:  Unable to assess(Pt sleeping)     Muscle Mass Loss:  Unable to assess(pt sleeping)    Fluid Accumulation:  No significant fluid accumulation     Strength:  Not Performed    Estimated Daily Nutrient Needs:  Energy (kcal):  1536-4911 kcals/day based on 20-23 kcals/kg/CBW; Weight Used for Energy Requirements:  Current     Protein (g):   g protein/day based on 1.3-1.5 g/kg/IBW;  Weight Used for Protein Requirements:  Ideal        Fluid (ml/day):  0392-8078 ml; Method Used for Fluid Requirements:  1 ml/kcal      Nutrition Related Findings:  Pt is A/O x4; + was sleeping at time of RD assessment; abdomen is WDL; presented to ED on 3/30 from [address] [place] [address] for alcohol detox; pt currently drinks 6-7 24oz beers/day x37 years + wants to get help; pt is anxious/worried about detox process; consuming % of meals during admission; pt is homeless and lives in his car + declined assistance with finding homeless shelter upon d/c when offered by care coorination; pts ALT/AST levels elevated, Na, Cr, and hematocrit levels low at this time; pt has ativan and xofran ordered at this time as part of [facility] protocol      Wounds:  None       Current Nutrition Therapies:    DIET GENERAL; Anthropometric Measures:  · Height: 5' 9\" (175.3 cm)  · Current Body Weight: 185 lb (83.9 kg)(obtained 3/30/21)   · Admission Body Weight: 185 lb (83.9 kg)(obtained 3/30/21 - stated)    · Usual Body Weight: 188 lb 7.9 oz (85.5 kg)(obtained 6/8/20; actual weight)     · Ideal Body Weight: 160 lbs; % Ideal Body Weight 115.6 %   · BMI: 27.3   · BMI Categories: Overweight (BMI 25.0-29. 9)       Nutrition Diagnosis:   · Predicted inadequate energy intake related to psychological cause or life stress, lack or limited access to food, inadequate protein-energy intake as evidenced by other (comment), poor intake prior to admission, poor dentition(Homelessness)      Nutrition Interventions:   Food and/or Nutrient Delivery:  Continue Current Diet, Start Oral Nutrition Supplement  Nutrition Education/Counseling:  No recommendation at this time   Coordination of Nutrition Care:  Continue to monitor while inpatient    Goals:  Pt will consume >75% of general diet x3 meals/day + he will consume 50% or greater of Ensure high-protein with breakfast and dinner meals       Nutrition Monitoring and Evaluation:   Behavioral-Environmental Outcomes:  Readiness for Change   Food/Nutrient Intake Outcomes:  Food and Nutrient Intake, Supplement Intake  Physical Signs/Symptoms Outcomes:  Biochemical Data, Chewing or Swallowing, Weight, Skin     Discharge Planning:    Continue current diet     Electronically signed by Thaddeus Benson RD, LD on 3/31/21 at 5:39 PM EDT    Contact: [phone]

## 2021-04-01 ENCOUNTER — APPOINTMENT (OUTPATIENT)
Dept: GENERAL RADIOLOGY | Age: 51
DRG: 897 | End: 2021-04-01
Payer: COMMERCIAL

## 2021-04-01 LAB
A/G RATIO: 1 (ref 1.1–2.2)
ALBUMIN SERPL-MCNC: 3.4 G/DL (ref 3.4–5)
ALP BLD-CCNC: 88 U/L (ref 40–129)
ALT SERPL-CCNC: 140 U/L (ref 10–40)
ANION GAP SERPL CALCULATED.3IONS-SCNC: 8 MMOL/L (ref 3–16)
AST SERPL-CCNC: 109 U/L (ref 15–37)
BILIRUB SERPL-MCNC: 0.7 MG/DL (ref 0–1)
BUN BLDV-MCNC: 8 MG/DL (ref 7–20)
CALCIUM SERPL-MCNC: 9 MG/DL (ref 8.3–10.6)
CHLORIDE BLD-SCNC: 103 MMOL/L (ref 99–110)
CO2: 26 MMOL/L (ref 21–32)
CREAT SERPL-MCNC: 0.7 MG/DL (ref 0.9–1.3)
GFR AFRICAN AMERICAN: >60
GFR NON-AFRICAN AMERICAN: >60
GLOBULIN: 3.4 G/DL
GLUCOSE BLD-MCNC: 119 MG/DL (ref 70–99)
HCT VFR BLD CALC: 40.7 % (ref 40.5–52.5)
HEMOGLOBIN: 13.7 G/DL (ref 13.5–17.5)
MAGNESIUM: 1.7 MG/DL (ref 1.8–2.4)
MCH RBC QN AUTO: 32.5 PG (ref 26–34)
MCHC RBC AUTO-ENTMCNC: 33.6 G/DL (ref 31–36)
MCV RBC AUTO: 96.5 FL (ref 80–100)
PDW BLD-RTO: 14.7 % (ref 12.4–15.4)
PLATELET # BLD: 67 K/UL (ref 135–450)
PMV BLD AUTO: 8.4 FL (ref 5–10.5)
POTASSIUM REFLEX MAGNESIUM: 3.5 MMOL/L (ref 3.5–5.1)
RBC # BLD: 4.21 M/UL (ref 4.2–5.9)
SODIUM BLD-SCNC: 137 MMOL/L (ref 136–145)
TOTAL PROTEIN: 6.8 G/DL (ref 6.4–8.2)
WBC # BLD: 2.1 K/UL (ref 4–11)

## 2021-04-01 PROCEDURE — 6370000000 HC RX 637 (ALT 250 FOR IP): Performed by: PHYSICIAN ASSISTANT

## 2021-04-01 PROCEDURE — 6370000000 HC RX 637 (ALT 250 FOR IP): Performed by: INTERNAL MEDICINE

## 2021-04-01 PROCEDURE — 71046 X-RAY EXAM CHEST 2 VIEWS: CPT

## 2021-04-01 PROCEDURE — 6360000002 HC RX W HCPCS: Performed by: PHYSICIAN ASSISTANT

## 2021-04-01 PROCEDURE — 1200000000 HC SEMI PRIVATE

## 2021-04-01 PROCEDURE — G0378 HOSPITAL OBSERVATION PER HR: HCPCS

## 2021-04-01 PROCEDURE — 83735 ASSAY OF MAGNESIUM: CPT

## 2021-04-01 PROCEDURE — 36415 COLL VENOUS BLD VENIPUNCTURE: CPT

## 2021-04-01 PROCEDURE — 80053 COMPREHEN METABOLIC PANEL: CPT

## 2021-04-01 PROCEDURE — 85027 COMPLETE CBC AUTOMATED: CPT

## 2021-04-01 PROCEDURE — 2580000003 HC RX 258: Performed by: PHYSICIAN ASSISTANT

## 2021-04-01 PROCEDURE — 99232 SBSQ HOSP IP/OBS MODERATE 35: CPT | Performed by: INTERNAL MEDICINE

## 2021-04-01 RX ORDER — BENZONATATE 100 MG/1
100 CAPSULE ORAL 3 TIMES DAILY PRN
Status: DISCONTINUED | OUTPATIENT
Start: 2021-04-01 | End: 2021-04-02 | Stop reason: HOSPADM

## 2021-04-01 RX ORDER — LACTOBACILLUS RHAMNOSUS GG 10B CELL
1 CAPSULE ORAL
Status: DISCONTINUED | OUTPATIENT
Start: 2021-04-02 | End: 2021-04-02 | Stop reason: HOSPADM

## 2021-04-01 RX ORDER — LOPERAMIDE HYDROCHLORIDE 2 MG/1
4 CAPSULE ORAL 3 TIMES DAILY PRN
Status: DISCONTINUED | OUTPATIENT
Start: 2021-04-01 | End: 2021-04-02 | Stop reason: HOSPADM

## 2021-04-01 RX ADMIN — Medication 100 MG: at 08:47

## 2021-04-01 RX ADMIN — CLONIDINE HYDROCHLORIDE 0.1 MG: 0.1 TABLET ORAL at 21:31

## 2021-04-01 RX ADMIN — MAGNESIUM GLUCONATE 500 MG ORAL TABLET 400 MG: 500 TABLET ORAL at 09:56

## 2021-04-01 RX ADMIN — Medication 1 TABLET: at 08:47

## 2021-04-01 RX ADMIN — BENZONATATE 100 MG: 100 CAPSULE ORAL at 09:56

## 2021-04-01 RX ADMIN — AMLODIPINE BESYLATE 10 MG: 5 TABLET ORAL at 08:47

## 2021-04-01 RX ADMIN — LOPERAMIDE HYDROCHLORIDE 4 MG: 2 CAPSULE ORAL at 11:48

## 2021-04-01 RX ADMIN — SODIUM CHLORIDE, PRESERVATIVE FREE 10 ML: 5 INJECTION INTRAVENOUS at 21:52

## 2021-04-01 RX ADMIN — ONDANSETRON HYDROCHLORIDE 4 MG: 2 INJECTION, SOLUTION INTRAMUSCULAR; INTRAVENOUS at 21:31

## 2021-04-01 RX ADMIN — SODIUM CHLORIDE, PRESERVATIVE FREE 10 ML: 5 INJECTION INTRAVENOUS at 08:48

## 2021-04-01 RX ADMIN — LORAZEPAM 1 MG: 1 TABLET ORAL at 21:31

## 2021-04-01 RX ADMIN — CLONIDINE HYDROCHLORIDE 0.1 MG: 0.1 TABLET ORAL at 08:47

## 2021-04-01 ASSESSMENT — PAIN DESCRIPTION - ONSET: ONSET: GRADUAL

## 2021-04-01 ASSESSMENT — PAIN DESCRIPTION - DESCRIPTORS: DESCRIPTORS: ACHING

## 2021-04-01 ASSESSMENT — PAIN DESCRIPTION - FREQUENCY: FREQUENCY: INTERMITTENT

## 2021-04-01 NOTE — PLAN OF CARE
Problem: Falls - Risk of:  Goal: Will remain free from falls  Description: Will remain free from falls  Outcome: Ongoing  Goal: Absence of physical injury  Description: Absence of physical injury  Outcome: Ongoing     Problem: Infection:  Goal: Will remain free from infection  Description: Will remain free from infection  Outcome: Ongoing     Problem: Safety:  Goal: Free from accidental physical injury  Description: Free from accidental physical injury  Outcome: Ongoing  Goal: Free from intentional harm  Description: Free from intentional harm  Outcome: Ongoing     Problem: Daily Care:  Goal: Daily care needs are met  Description: Daily care needs are met  Outcome: Ongoing     Problem: Pain:  Goal: Patient's pain/discomfort is manageable  Description: Patient's pain/discomfort is manageable  Outcome: Ongoing     Problem: Skin Integrity:  Goal: Skin integrity will stabilize  Description: Skin integrity will stabilize  Outcome: Ongoing     Problem: Discharge Planning:  Goal: Patients continuum of care needs are met  Description: Patients continuum of care needs are met  Outcome: Ongoing     Problem: Nutrition  Goal: Optimal nutrition therapy  Outcome: Ongoing  Goal: Understanding of nutritional guidelines  Outcome: Ongoing

## 2021-04-01 NOTE — CARE COORDINATION
INTERDISCIPLINARY PLAN OF CARE CONFERENCE    Date/Time: 4/1/2021 1:24 PM  Completed by: JADON Boyd. Case Management      Patient Name:  Betty Jaimes  YOB: 1970  Admitting Diagnosis: Alcohol withdrawal, uncomplicated ([facility] [address] [T85.943]     Admit Date/Time:  3/30/2021 10:37 AM    Chart reviewed. Interdisciplinary team contacted or reviewed plan related to patient progress and discharge plans. Disciplines included Case Management, Nursing, and Dietitian. Current Status:Ongoing   PT/OT recommendation for discharge plan of care: n/a    Expected D/C Disposition: To his car  Confirmed plan with patient and/or family Yes confirmed with: (name) pt  Met with:pt    Discharge Plan Comments: Chart review completed. Met with pt at bedside. He provided minimal responses to the questions being asked. Pt stated he had not reviewed the folder that was provided to him. Encouraged him to review the folder and to call homeless shelters if he is open to this. He stated okay. He denied needing ANY assistance from CM when writer inquired. Home O2 in place on admit: No    CM will follow. Please notify CM if needs or concerns arise. Addendum at 3:10pm: Met with pt at bedside with RN Present. Pt stated [facility] her today. Inquired if pt was open to any treatment for SA. He stated that he wants to do Outpatient at [facility] as he went there before coming to the hospital. Pt called [facility] while writer was at bedside. Explained writer could call the liaison with him signing a release of information if he was agreeable to this. He requested to complete a release of information;copy provided to pt per his request and original placed in hard chart. Spoke with Neo Christianson at [employer] who stated she would come meet with the pt tomorrow am at bedside. She was provided with pt's cell phone and room number.  Neo Christianson stated pt has to be discharged prior to 3:00pm to get to [address] [place] [address]. After speaking with Alessandro Veliz, spoke with pt via RN mobile phone. Pt stated he will schedule an appointment for 1:00pm tomorrow at [address] [place] [address]. He is aware as long as he remains medically stable, he will likely discharge tomorrow. MD notified and RN notified.

## 2021-04-01 NOTE — FLOWSHEET NOTE
provided pastoral support for patient, who states that God is not answering his prayer for healing, despite his pleas for help to overcome his substance abuse. Patient states that he has \"lost\" his family and wishes to restore relationships with his former spouse and his daughter. In spite of this, pt states that he tries to have hope.  provided active listening and prayer; and reminded pt that God is more forgiving and compassionate than we can imagine. Spiritual Care will follow up with patient to provide ongoing support and prayer. 04/01/21 1032   Encounter Summary   Services provided to: Patient   Referral/Consult From: Nurse   Continue Visiting Yes  (For ongoing spiritual support and prayer.)   Complexity of Encounter Moderate   Length of Encounter 15 minutes   Spiritual Assessment Completed Yes   Crisis   Type Emotional distress;Relationship concerns   Assessment Approachable; Hopeless;Spiritual struggle; Helplessness   Intervention Active listening;Explored feelings, thoughts, concerns;Nurtured hope;Prayer;Sustaining presence/ Ministry of presence; Discussed relationship with God   Outcome Engaged in conversation; Shared life review;Expressed feelings/needs/concerns;Expressed regrets; Receptive

## 2021-04-01 NOTE — FLOWSHEET NOTE
03/31/21 1906   Vital Signs   Temp 98.5 °F (36.9 °C)   Temp Source Oral   Pulse 104   Heart Rate Source Monitor   Resp 18   BP (!) 134/97   BP Location Left upper arm   Patient Position Semi fowlers   Level of Consciousness Alert (0)   MEWS Score 2   Oxygen Therapy   SpO2 98 %   O2 Device None (Room air)   Pt A/O  X 4 assessment completed. Meds given per MAR. CIWA score 9 1 mg PO Ativan given. Pt denies any needs.  Call light within reach

## 2021-04-01 NOTE — PROGRESS NOTES
Progress Note    Admit Date:  3/30/2021    Subjective:  Mr. Natalia Mansfield is stable today. no nausea ,vomiting or diarrhea . Not required any prn  meds. He is ready for discharge, he feels better . I have discussed with discharge planner to consider homeless shelters versus inpatient alcohol rehab programs    Objective:     /85   Pulse 88   Temp 97.4 °F (36.3 °C) (Oral)   Resp 18   Ht 5' 9\" (1.753 m)   Wt 185 lb (83.9 kg)   SpO2 98%   BMI 27.32 kg/m²       Intake/Output Summary (Last 24 hours) at 4/1/2021 1451  Last data filed at 4/1/2021 1302  Gross per 24 hour   Intake 600 ml   Output --   Net 600 ml       Physical Exam:  Gen: No distress. Alert. Eyes: PERRL. No sclera icterus. No conjunctival injection. ENT: No discharge. Pharynx clear. Neck: No JVD. No Carotid Bruit. Trachea midline. Resp: No accessory muscle use. No crackles. No wheezes. No rhonchi. CV: Regular rate. Regular rhythm. No murmur. No rub. No edema. GI: Non-tender. Non-distended. No masses. No organomegaly. Normal bowel sounds. No hernia. Skin: Warm and dry. No nodule on exposed extremities. No rash on exposed extremities. M/S: No cyanosis. No joint deformity. No clubbing.   + right knee effusion. No erythema or warmth, full AROM at R knee joint without pain   Neuro: Awake. Grossly nonfocal     Psych: Oriented x 3. No anxiety or agitation.      Scheduled Meds:   magnesium oxide  400 mg Oral Daily    [START ON 4/2/2021] lactobacillus  1 capsule Oral Daily with breakfast    sodium chloride flush  10 mL Intravenous 2 times per day    multivitamin  1 tablet Oral Daily    thiamine  100 mg Oral Daily    amLODIPine  10 mg Oral Daily    nicotine  1 patch Transdermal Daily    cloNIDine  0.1 mg Oral BID       Continuous Infusions:   sodium chloride         PRN Meds:  benzonatate, loperamide, ibuprofen, sodium chloride flush, sodium chloride, promethazine **OR** ondansetron, polyethylene glycol, LORazepam **OR** LORazepam **OR** LORazepam **OR** LORazepam **OR** LORazepam **OR** LORazepam **OR** LORazepam **OR** LORazepam      Data:  CBC:   Recent Labs     03/30/21  1152 03/31/21  0536 04/01/21  0513   WBC 2.5* 2.8* 2.1*   HGB 15.0 13.7 13.7   HCT 44.4 40.2* 40.7   MCV 95.9 95.6 96.5   PLT 89* 78* 67*     BMP:   Recent Labs     03/30/21  1152 03/31/21  0536 04/01/21  0513    135* 137   K 4.2 3.8 3.5    96* 103   CO2 28 29 26   BUN 4* 8 8   CREATININE 0.7* 0.8* 0.7*     LIVER PROFILE:   Recent Labs     03/30/21  1152 03/31/21  0536 04/01/21  0513   * 165* 109*   * 202* 140*   BILITOT 0.5 0.6 0.7   ALKPHOS 75 91 88     PT/INR:   Recent Labs     03/30/21  1152   PROTIME 12.5   INR 1.08       CULTURES  COVID 19 PCR: not detected      RADIOLOGY  XR CHEST (2 VW)   Final Result   No acute abnormality.              Assessment/Plan:    #Alcohol dependence with acute uncomplicated withdrawal  - last drink was 0700 on 3/30- ethanol level 298 in ER  - CIWA protocol with ativan PRN  - PRN antinausea and antidiarrheal meds   - seizure and fall precautions  - CM for rehab resources - he was in contact with Jaye Conley before coming to ER      #Leukopenia  #Thrombocytopenia   - chronic and noted on labs 6/2020  - unclear etiology- could be BM suppression related to longstanding alcohol abuse  - will need further work up- can be completed as OP- he stated understanding      #Transaminitis   - suspect underling liver disease related to long standing alcohol abuse  - Checked acute hepatitis panel- neg   - repeat labs in AM- trending downward      #HTN- uncontrolled   - has been off norvasc for at least a week prior to admit- resumed norvasc 10 mg daily  - clonidine 0.1 mg BID added  - BP improving      # Cocaine abuse   - last use ~3 weeks ago- recommended cessation    #Tobacco Dependence  -Recommended cessation  - nicotine patch ordered      #Right knee effusion  - present for the past several weeks per patient report  - no apparent infection based on my exam  - rest, ice, NSAIDs PRN pain.   Will give ortho referral for after discharge     DVT Prophylaxis: SCD  Diet: DIET GENERAL;  Dietary Nutrition Supplements: Low Calorie High Protein Supplement  Code Status: Full Code    DC planning for tomorrow      Veena Mitchell MD  4/1/2021 2:51 PM

## 2021-04-01 NOTE — PLAN OF CARE
Problem: Falls - Risk of:  Goal: Will remain free from falls  Description: Will remain free from falls  4/1/2021 0356 by Nimco Epps RN  Outcome: Ongoing  Goal: Absence of physical injury  Description: Absence of physical injury  4/1/2021 1640 by Damaris Rangel RN  Outcome: Ongoing  4/1/2021 0356 by Nimco Epps RN  Outcome: Ongoing     Problem: Infection:  Goal: Will remain free from infection  Description: Will remain free from infection  4/1/2021 0356 by Nimco Epps RN  Outcome: Ongoing     Problem: Safety:  Goal: Free from accidental physical injury  Description: Free from accidental physical injury  4/1/2021 0356 by Nimco Epps RN  Outcome: Ongoing  Goal: Free from intentional harm  Description: Free from intentional harm  4/1/2021 0356 by Nimco Epps RN  Outcome: Ongoing     Problem: Daily Care:  Goal: Daily care needs are met  Description: Daily care needs are met  4/1/2021 1640 by Damaris Rangel RN  Outcome: Ongoing  4/1/2021 0356 by Nimco Epps RN  Outcome: Ongoing     Problem: Pain:  Goal: Patient's pain/discomfort is manageable  Description: Patient's pain/discomfort is manageable  4/1/2021 1640 by Damaris Rangel RN  Outcome: Ongoing  4/1/2021 0356 by Nimco Epps RN  Outcome: Ongoing     Problem: Skin Integrity:  Goal: Skin integrity will stabilize  Description: Skin integrity will stabilize  4/1/2021 0356 by Nimco Epps RN  Outcome: Ongoing     Problem: Discharge Planning:  Goal: Patients continuum of care needs are met  Description: Patients continuum of care needs are met  4/1/2021 0356 by Nimco Epps RN  Outcome: Ongoing     Problem: Nutrition  Goal: Optimal nutrition therapy  4/1/2021 0356 by Nimco Epps RN  Outcome: Ongoing  Goal: Understanding of nutritional guidelines  4/1/2021 0356 by Nimco Epps RN  Outcome: Ongoing

## 2021-04-01 NOTE — PROGRESS NOTES
Pt in bed. Reports cough. Cough is wet and congested, and is asking something for the cough. Ciwa 2. Assessemtn complete. meds passed. Pt is calm and is keeping eyes closed, lights off, and the room quiet.  Will continue to monitor

## 2021-04-01 NOTE — PROGRESS NOTES
RN working with case management regarding pt discharge. Possible discharge tomorrow. Pt states he is ready to change his life and see his daughter and family. Pt states he will be staying with his sister after he gets discharged. RN reheated pt lunch tray per his request. Pt ate his whole tray.  Will continue to monitor

## 2021-04-01 NOTE — PROGRESS NOTES
RN taked to [employer] PA regardin pt's morning labs. PA orderd mag oxide to help with low mag level. Ina Stephens also increased dose of PRN loperamide to 4mg TID, and added a daily probiotic to help with the pt's diarrhea. RN enforced need to eat. Assessment complete, meds passed. Call light in reach. PRN imodium given for diarrhea. Pt states he feels like hes getting through his withdrawal. CIWA 2.

## 2021-04-02 VITALS
HEIGHT: 69 IN | RESPIRATION RATE: 16 BRPM | BODY MASS INDEX: 27.4 KG/M2 | HEART RATE: 83 BPM | OXYGEN SATURATION: 100 % | SYSTOLIC BLOOD PRESSURE: 147 MMHG | WEIGHT: 185 LBS | DIASTOLIC BLOOD PRESSURE: 100 MMHG | TEMPERATURE: 98.2 F

## 2021-04-02 PROCEDURE — 99238 HOSP IP/OBS DSCHRG MGMT 30/<: CPT | Performed by: NURSE PRACTITIONER

## 2021-04-02 PROCEDURE — 6370000000 HC RX 637 (ALT 250 FOR IP): Performed by: PHYSICIAN ASSISTANT

## 2021-04-02 PROCEDURE — G0378 HOSPITAL OBSERVATION PER HR: HCPCS

## 2021-04-02 PROCEDURE — 6370000000 HC RX 637 (ALT 250 FOR IP): Performed by: INTERNAL MEDICINE

## 2021-04-02 RX ORDER — CLONIDINE HYDROCHLORIDE 0.1 MG/1
0.1 TABLET ORAL 2 TIMES DAILY
Qty: 60 TABLET | Refills: 3 | Status: SHIPPED | OUTPATIENT
Start: 2021-04-02 | End: 2022-05-02

## 2021-04-02 RX ADMIN — CLONIDINE HYDROCHLORIDE 0.1 MG: 0.1 TABLET ORAL at 09:01

## 2021-04-02 RX ADMIN — Medication 1 CAPSULE: at 09:01

## 2021-04-02 RX ADMIN — AMLODIPINE BESYLATE 10 MG: 5 TABLET ORAL at 09:01

## 2021-04-02 RX ADMIN — Medication 100 MG: at 09:01

## 2021-04-02 RX ADMIN — Medication 1 TABLET: at 09:01

## 2021-04-02 RX ADMIN — MAGNESIUM GLUCONATE 500 MG ORAL TABLET 400 MG: 500 TABLET ORAL at 09:01

## 2021-04-02 ASSESSMENT — PAIN SCALES - GENERAL
PAINLEVEL_OUTOF10: 0

## 2021-04-02 NOTE — CARE COORDINATION
DISCHARGE ORDER  Date/Time 2021 9:00 AM  Completed by:  Yesenia Mallory Management    Patient Name: Srinivasa Nielsen      : 1970  Admitting Diagnosis: Alcohol withdrawal, uncomplicated ([facility] [address] [Z93.068]      Admit order Date and Status: 2021 Inpatient   (verify MD's last order for status of admission)      Noted discharge order. If applicable PT/OT recommendation at Discharge: n/a  DME recommendation by PT/OT:n/a    Confirmed discharge plan: Yes  with whom n/a  If pt confirmed DC plan does family need to be contacted by CM No if yes who n/a    Discharge Plan: Reviewed chart. Role of discharge planner explained and patient verbalized understanding. Discharge order is noted. Met with pt at bedside. He confirmed he plans on going to [facility] at 1:00pm today as he arranged his appt at the [place] location. Pt stated he plans on going to his sisters after his appt at [facility] as she lives in the [place] area. Pt stated he would need a cab to [facility]. Pt aware that the cab would take him to [facility] but then he would need to get a ride to his sisters and he stated no issues. He stated he would need to leave the hospital by Lelia Frames to get to [facility] by 1:00pm. Confirmed the address with pt of the [facility] location of [address] [place] [address]. He stated that Carlos Powers from [facility] has not met with him yet. He was agreeable to writer calling her to see if she was still seeing pt. Writer did mention meds to beds for any new medications but he was unclear if he was open to this. Pt denied further needs from CM at this time regarding discharge. Carlos Powers at [facility] stated she will be here shortly after 10 to see the pt. She is aware pt will be leaving around noon. BODØ RN aware of the above and to ask pt about Meds to [facility]. Provided address to [facility] to pt's RN for the cab voucher.     Has Home O2 in place on admit:  No    Pt is being d/c'd to [facility] then to his sisters today. Pt's O2 sats are 100% on RA. Discharge timeout done with [facility]. All discharge needs and concerns addressed. Addendum at 11:25am: Sanjay Barrios with [facility] called stating she met with pt at bedside and provided pt with her number.

## 2021-04-02 NOTE — PROGRESS NOTES
[facility] here. This RN took pt. Down to cab via wheelchair. Pt. Stable when getting into cab.  Jake Vee RN

## 2021-04-02 NOTE — PROGRESS NOTES
Discharge paperwork reviewed with pt. Pt. Sylvia Oneill understanding. Pt. Denies any questions at this time. [facility] called. States will  pt. In 20-25 minutes. Spoke with [facility] [address] and informed them medications had been sent to [facility] [address], but pt. Would like to  from them. They said they would make the transfer.  Heladio Ulloa RN

## 2021-04-02 NOTE — DISCHARGE SUMMARY
Name:  Angelica Ann  Room:  9563/7435-04  MRN:    [id number]    Discharge Summary      This discharge summary is in conjunction with a complete physical exam done on the day of discharge. Attending Physician: Dr. Rhys Georges  Discharging Provider: Felton ALCALA      Admit: 3/30/2021  Discharge:   4/2/2021    HPI:  The patient is a 53 y.o. male with HTN, alcohol dependence (abusing alcohol on and off x 37 years) who presented to [facility] ED with complaint of alcohol problem, request for detox. For the past 2 weeks he has been drinking 6-7 24oz beers per day. Prior to two weeks ago he was sober for about a month. He has been trying to quit drinking on his own but has been unsuccessful. He went to [facility] today and was directed to the hospital for detox due to the quantity of his typical daily alcohol intake. His last drink was 0700 this AM.  He is asymptomatic form a withdrawal standpoint currently. Typically when he goes through withdrawal he only experiences tremors. He denies a history of severe withdrawal or DTs     He denies current drug use.   Previously used crack cocaine- last use 3 weeks ago  He has never used IV drugs   He smokes 1/2 ppd     Diagnoses this Admission and Hospital Course   #Alcohol dependence with acute uncomplicated withdrawal  - last drink was 0700 on 3/30- ethanol level 298 in ER  - CIWA protocol with ativan PRN  - PRN antinausea and antidiarrheal meds   - seizure and fall precautions  - CM for rehab resources - he was in contact with [facility] before coming to [facility]  D/c with plans to go to [facility]     #Leukopenia  #Thrombocytopenia   - chronic and noted on labs 6/2020  - unclear etiology- could be BM suppression related to longstanding alcohol abuse  - will need further work up- can be completed as OP- he stated understanding      #Transaminitis   - suspect underling liver disease related to long standing alcohol abuse  - Checked acute hepatitis panel- neg   - repeat labs in AM- trending downward      #HTN- uncontrolled   - has been off norvasc for at least a week prior to admit- resumed norvasc 10 mg daily  - clonidine 0.1 mg BID added  - BP improving      # Cocaine abuse   - last use ~3 weeks ago- recommended cessation     #Tobacco Dependence  -Recommended cessation  - nicotine patch ordered       #Right knee effusion  - present for the past several weeks per patient report  - no apparent infection based on my exam  - rest, ice, NSAIDs PRN pain. Will give ortho referral for after discharge      DVT Prophylaxis: SCD    Procedures (Please Review Full Report for Details)  N/A    Consults    N/A      Physical Exam at Discharge:    BP (!) 147/100   Pulse 83   Temp 98.2 °F (36.8 °C) (Oral)   Resp 16   Ht 5' 9\" (1.753 m)   Wt 185 lb (83.9 kg)   SpO2 100%   BMI 27.32 kg/m²     Gen: No distress. Alert. Eyes: PERRL. No sclera icterus. No conjunctival injection. ENT: No discharge. Pharynx clear. Neck: No JVD.  No Carotid Bruit. Trachea midline. Resp: No accessory muscle use. No crackles. No wheezes. No rhonchi. CV: Regular rate. Regular rhythm. No murmur.  No rub. No edema. GI: Non-tender. Non-distended. No masses. No organomegaly. Normal bowel sounds. No hernia. Skin: Warm and dry. No nodule on exposed extremities. No rash on exposed extremities. M/S: No cyanosis. No joint deformity. No clubbing.   + right knee effusion. No erythema or warmth, full AROM at R knee joint without pain   Neuro: Awake. Grossly nonfocal     Psych: Oriented x 3. No anxiety or agitation.     CBC:   Recent Labs     03/30/21  1152 03/31/21  0536 04/01/21  0513   WBC 2.5* 2.8* 2.1*   HGB 15.0 13.7 13.7   HCT 44.4 40.2* 40.7   MCV 95.9 95.6 96.5   PLT 89* 78* 67*     BMP:   Recent Labs     03/30/21  1152 03/31/21  0536 04/01/21  0513    135* 137   K 4.2 3.8 3.5    96* 103   CO2 28 29 26   BUN 4* 8 8   CREATININE 0.7* 0.8* 0.7*     LIVER PROFILE:   Recent Labs     03/30/21  1152 03/31/21  0536 04/01/21  0513   * 165* 109*   * 202* 140*   BILITOT 0.5 0.6 0.7   ALKPHOS 75 91 88     PT/INR:   Recent Labs     03/30/21  1152   PROTIME 12.5   INR 1.08     UA:  Recent Labs     03/30/21  1355   COLORU Yellow   PHUR 6.0  6.0   CLARITYU Clear   SPECGRAV 1.010   LEUKOCYTESUR Negative   UROBILINOGEN 0.2   BILIRUBINUR Negative   BLOODU Negative   GLUCOSEU Negative         CULTURES  COVID 19 PCR: not detected     RADIOLOGY  XR CHEST (2 VW)   Final Result   No acute abnormality. Discharge Medications     Medication List      START taking these medications    cloNIDine 0.1 MG tablet  Commonly known as: CATAPRES  Take 1 tablet by mouth 2 times daily        CHANGE how you take these medications    amLODIPine 10 MG tablet  Commonly known as: NORVASC  What changed: Another medication with the same name was removed. Continue taking this medication, and follow the directions you see here. CONTINUE taking these medications    promethazine 25 MG tablet  Commonly known as: PHENERGAN           Where to Get Your Medications      These medications were sent to [facility] [address], [address] [place] [address], [address] [place] [address]    Phone: [phone]   · cloNIDine 0.1 MG tablet           Discharged in stable condition to go to [address] [place] [address]. Follow up OP with PCP, jermaine CALDWELL-C  4/2/2021

## 2021-04-02 NOTE — PLAN OF CARE
Problem: Falls - Risk of:  Goal: Will remain free from falls  Description: Will remain free from falls  8/1/7651 1461 by Misti Washington RN  Outcome: Ongoing  9/5/3302 7625 by Misti Washington RN  Outcome: Ongoing  Goal: Absence of physical injury  Description: Absence of physical injury  1/6/5951 0228 by Misti Washington RN  Outcome: Ongoing  6/3/3905 5639 by Misti Washington RN  Outcome: Ongoing  4/1/2021 1640 by Bi Moses RN  Outcome: Ongoing     Problem: Infection:  Goal: Will remain free from infection  Description: Will remain free from infection  6/7/1138 8057 by Misti Washington RN  Outcome: Ongoing  4/1/5087 8455 by Misti Washington RN  Outcome: Ongoing     Problem: Safety:  Goal: Free from accidental physical injury  Description: Free from accidental physical injury  4/5/7679 3620 by Misti Washington RN  Outcome: Ongoing  9/4/3557 2399 by Misti Washington RN  Outcome: Ongoing  Goal: Free from intentional harm  Description: Free from intentional harm  3/4/4879 5589 by Misti Washington RN  Outcome: Ongoing  2/5/8641 5537 by Misti Washington RN  Outcome: Ongoing     Problem: Daily Care:  Goal: Daily care needs are met  Description: Daily care needs are met  5/8/4942 6682 by Misti Washington RN  Outcome: Ongoing  2/4/1536 7462 by Misti Washington RN  Outcome: Ongoing  4/1/2021 1640 by Bi Moses RN  Outcome: Ongoing     Problem: Pain:  Goal: Patient's pain/discomfort is manageable  Description: Patient's pain/discomfort is manageable  2/2/3707 6838 by Misti Washington RN  Outcome: Ongoing  0/1/0904 2606 by Misti Washington RN  Outcome: Ongoing  4/1/2021 1640 by Bi Moses RN  Outcome: Ongoing     Problem: Skin Integrity:  Goal: Skin integrity will stabilize  Description: Skin integrity will stabilize  2/0/9134 8859 by Misti Washington RN  Outcome: Ongoing  2/9/3698 9982 by Misti Washington RN  Outcome: Ongoing     Problem: Discharge Planning:  Goal: Patients continuum of care needs are met  Description: Patients continuum of care needs are met  6/9/4090 3751 by Chaitanya Adame RN  Outcome: Ongoing  7/9/8901 8773 by Chaitanya Adame RN  Outcome: Ongoing     Problem: Nutrition  Goal: Optimal nutrition therapy  3/5/8452 7087 by Chaitanya Adame RN  Outcome: Ongoing  7/6/3824 6334 by Chaitanya Adame, RN  Outcome: Ongoing  Goal: Understanding of nutritional guidelines  1/6/5036 7340 by Chaitanya Adame RN  Outcome: Ongoing  9/6/0298 0249 by Chaitanya Adame RN  Outcome: Ongoing

## 2021-12-11 ENCOUNTER — APPOINTMENT (OUTPATIENT)
Dept: GENERAL RADIOLOGY | Age: 51
End: 2021-12-11
Payer: COMMERCIAL

## 2021-12-11 ENCOUNTER — HOSPITAL ENCOUNTER (EMERGENCY)
Age: 51
Discharge: HOME | End: 2021-12-12
Attending: EMERGENCY MEDICINE
Payer: COMMERCIAL

## 2021-12-11 DIAGNOSIS — R07.9 CHEST PAIN, UNSPECIFIED TYPE: Primary | ICD-10-CM

## 2021-12-11 LAB
A/G RATIO: 1.3 (ref 1.1–2.2)
ALBUMIN SERPL-MCNC: 3.9 G/DL (ref 3.4–5)
ALP BLD-CCNC: 65 U/L (ref 40–129)
ALT SERPL-CCNC: 19 U/L (ref 10–40)
ANION GAP SERPL CALCULATED.3IONS-SCNC: 7 MMOL/L (ref 3–16)
AST SERPL-CCNC: 26 U/L (ref 15–37)
BASOPHILS ABSOLUTE: 0 K/UL (ref 0–0.2)
BASOPHILS RELATIVE PERCENT: 1 %
BILIRUB SERPL-MCNC: 0.7 MG/DL (ref 0–1)
BUN BLDV-MCNC: 14 MG/DL (ref 7–20)
CALCIUM SERPL-MCNC: 9 MG/DL (ref 8.3–10.6)
CHLORIDE BLD-SCNC: 105 MMOL/L (ref 99–110)
CO2: 28 MMOL/L (ref 21–32)
CREAT SERPL-MCNC: 0.6 MG/DL (ref 0.9–1.3)
D DIMER: <200 NG/ML DDU (ref 0–229)
EOSINOPHILS ABSOLUTE: 0.3 K/UL (ref 0–0.6)
EOSINOPHILS RELATIVE PERCENT: 6.5 %
GFR AFRICAN AMERICAN: >60
GFR NON-AFRICAN AMERICAN: >60
GLUCOSE BLD-MCNC: 96 MG/DL (ref 70–99)
HCT VFR BLD CALC: 40.2 % (ref 40.5–52.5)
HEMOGLOBIN: 13.8 G/DL (ref 13.5–17.5)
LYMPHOCYTES ABSOLUTE: 1.7 K/UL (ref 1–5.1)
LYMPHOCYTES RELATIVE PERCENT: 36 %
MCH RBC QN AUTO: 31.4 PG (ref 26–34)
MCHC RBC AUTO-ENTMCNC: 34.2 G/DL (ref 31–36)
MCV RBC AUTO: 91.8 FL (ref 80–100)
MONOCYTES ABSOLUTE: 0.4 K/UL (ref 0–1.3)
MONOCYTES RELATIVE PERCENT: 8.6 %
NEUTROPHILS ABSOLUTE: 2.3 K/UL (ref 1.7–7.7)
NEUTROPHILS RELATIVE PERCENT: 47.9 %
PDW BLD-RTO: 13.5 % (ref 12.4–15.4)
PLATELET # BLD: 173 K/UL (ref 135–450)
PMV BLD AUTO: 7.7 FL (ref 5–10.5)
POTASSIUM REFLEX MAGNESIUM: 3.7 MMOL/L (ref 3.5–5.1)
PRO-BNP: 45 PG/ML (ref 0–124)
RBC # BLD: 4.38 M/UL (ref 4.2–5.9)
SODIUM BLD-SCNC: 140 MMOL/L (ref 136–145)
TOTAL PROTEIN: 6.9 G/DL (ref 6.4–8.2)
TROPONIN: <0.01 NG/ML
WBC # BLD: 4.8 K/UL (ref 4–11)

## 2021-12-11 PROCEDURE — 85025 COMPLETE CBC W/AUTO DIFF WBC: CPT

## 2021-12-11 PROCEDURE — 80053 COMPREHEN METABOLIC PANEL: CPT

## 2021-12-11 PROCEDURE — 83880 ASSAY OF NATRIURETIC PEPTIDE: CPT

## 2021-12-11 PROCEDURE — 84484 ASSAY OF TROPONIN QUANT: CPT

## 2021-12-11 PROCEDURE — 93005 ELECTROCARDIOGRAM TRACING: CPT | Performed by: EMERGENCY MEDICINE

## 2021-12-11 PROCEDURE — 36415 COLL VENOUS BLD VENIPUNCTURE: CPT

## 2021-12-11 PROCEDURE — 6370000000 HC RX 637 (ALT 250 FOR IP): Performed by: EMERGENCY MEDICINE

## 2021-12-11 PROCEDURE — 99284 EMERGENCY DEPT VISIT MOD MDM: CPT

## 2021-12-11 PROCEDURE — 71045 X-RAY EXAM CHEST 1 VIEW: CPT

## 2021-12-11 PROCEDURE — 85379 FIBRIN DEGRADATION QUANT: CPT

## 2021-12-11 RX ORDER — ASPIRIN 81 MG/1
324 TABLET, CHEWABLE ORAL ONCE
Status: COMPLETED | OUTPATIENT
Start: 2021-12-11 | End: 2021-12-11

## 2021-12-11 RX ORDER — NITROGLYCERIN 0.4 MG/1
0.4 TABLET SUBLINGUAL ONCE
Status: COMPLETED | OUTPATIENT
Start: 2021-12-11 | End: 2021-12-11

## 2021-12-11 RX ADMIN — NITROGLYCERIN 0.4 MG: 0.4 TABLET, ORALLY DISINTEGRATING SUBLINGUAL at 23:29

## 2021-12-11 RX ADMIN — ASPIRIN 324 MG: 81 TABLET, CHEWABLE ORAL at 23:28

## 2021-12-11 ASSESSMENT — PAIN DESCRIPTION - PROGRESSION
CLINICAL_PROGRESSION: GRADUALLY IMPROVING
CLINICAL_PROGRESSION: GRADUALLY IMPROVING

## 2021-12-11 ASSESSMENT — PAIN DESCRIPTION - LOCATION
LOCATION: CHEST
LOCATION: CHEST

## 2021-12-11 ASSESSMENT — PAIN SCALES - GENERAL
PAINLEVEL_OUTOF10: 3
PAINLEVEL_OUTOF10: 2

## 2021-12-11 ASSESSMENT — PAIN DESCRIPTION - ONSET
ONSET: SUDDEN
ONSET: SUDDEN

## 2021-12-11 ASSESSMENT — PAIN DESCRIPTION - FREQUENCY
FREQUENCY: INTERMITTENT
FREQUENCY: INTERMITTENT

## 2021-12-11 ASSESSMENT — PAIN DESCRIPTION - DESCRIPTORS
DESCRIPTORS: ACHING
DESCRIPTORS: ACHING

## 2021-12-11 ASSESSMENT — HEART SCORE: ECG: 0

## 2021-12-11 ASSESSMENT — PAIN DESCRIPTION - PAIN TYPE
TYPE: ACUTE PAIN
TYPE: ACUTE PAIN

## 2021-12-11 ASSESSMENT — PAIN DESCRIPTION - ORIENTATION
ORIENTATION: LEFT;MID
ORIENTATION: LEFT;MID

## 2021-12-11 NOTE — Clinical Note
Sandrita Dunham was seen and treated in our emergency department on 12/11/2021. He may return to work on 12/13/2021. If you have any questions or concerns, please don't hesitate to call.       Lencho Lake, DO

## 2021-12-12 VITALS
HEIGHT: 69 IN | RESPIRATION RATE: 17 BRPM | TEMPERATURE: 97.6 F | BODY MASS INDEX: 28.05 KG/M2 | HEART RATE: 80 BPM | SYSTOLIC BLOOD PRESSURE: 132 MMHG | DIASTOLIC BLOOD PRESSURE: 97 MMHG | OXYGEN SATURATION: 98 % | WEIGHT: 189.38 LBS

## 2021-12-12 LAB
EKG ATRIAL RATE: 72 BPM
EKG DIAGNOSIS: NORMAL
EKG P AXIS: 35 DEGREES
EKG P-R INTERVAL: 184 MS
EKG Q-T INTERVAL: 394 MS
EKG QRS DURATION: 110 MS
EKG QTC CALCULATION (BAZETT): 431 MS
EKG R AXIS: -29 DEGREES
EKG T AXIS: 4 DEGREES
EKG VENTRICULAR RATE: 72 BPM
TROPONIN: <0.01 NG/ML

## 2021-12-12 PROCEDURE — 93010 ELECTROCARDIOGRAM REPORT: CPT | Performed by: INTERNAL MEDICINE

## 2021-12-12 PROCEDURE — 84484 ASSAY OF TROPONIN QUANT: CPT

## 2021-12-12 PROCEDURE — 36415 COLL VENOUS BLD VENIPUNCTURE: CPT

## 2021-12-12 NOTE — ED TRIAGE NOTES
Patient states he was sitting up to change positions and he had sudden 10/10 chest pain on the left side. No medications PTA. Denies history of chest pain.

## 2021-12-12 NOTE — ED PROVIDER NOTES
[facility] ENCOUNTER      Pt Name: Ann-Marie Ortez  MRN: [id number]  [place] 1970  Date of evaluation: 12/11/2021  Provider: Arizona Hammans, [facility]       Chief Complaint   Patient presents with    Chest Pain         HISTORY OF PRESENT ILLNESS   (Location/Symptom, Timing/Onset, Context/Setting, Quality, Duration, Modifying Factors, Severity)  Note limiting factors. Ann-Marie Ortez is a 49 y.o. male who presents to the emergency department with a complaint of sharp stabbing pain in the lower midsternal area that began 45 minutes prior to arrival.  He states that it waking him from sleep. He denies any associated back pain or radiation of the pain. No neck or jaw pain. He denies any associated shortness of breath or dyspnea on exertion but he states that the pain is slightly worsened with deep inspiration. He denies any abdominal pain nausea vomiting or diarrhea. No dysuria hematuria frequency urgency. He denies any fever chills cough or cold symptoms. He denies any exposure to Covid. No loss of taste or smell. No body aches. No headache. He smokes tobacco.  He has a history of hypertension and hyperlipidemia. He states that his father had some form of heart disease but he is unsure of the details. He denies any personal history of coronary artery disease or thromboembolic disease. He denies any family history of thromboembolic disease. He denies any leg or calf pain. No recent travel. He does not take any anticoagulants. He denies any drug use. He has a history of alcohol use in the past but denies any current alcohol use today. Nursing Notes were reviewed. HPI        REVIEW OF SYSTEMS    (2-9 systems for level 4, 10 or more for level 5)       Constitutional: Negative for fever or chills. HENT: Negative for rhinorrhea and sore throat. Eyes: Negative for redness or drainage.    Respiratory: Negative for shortness of breath or dyspnea on exertion. Gastrointestinal: Negative for abdominal pain. Negative for vomiting or diarrhea. Genitourinary: Negative for flank pain. Negative for dysuria. Negative for hematuria. Neurological: Negative for headache. Musculoskeletal:  Negative edema. Hematological: Negative for adenopathy. All systems are reviewed and are negative except for those listed above in the history of present illness and ROS. PAST MEDICAL HISTORY     Past Medical History:   Diagnosis Date    Alcohol abuse     Depression     Diverticulitis     Hyperlipidemia     Hypertension          SURGICAL HISTORY       Past Surgical History:   Procedure Laterality Date    COLECTOMY      HAND SURGERY Right 9/2/14    Middle finger revision amputation         CURRENT MEDICATIONS       Previous Medications    AMLODIPINE (NORVASC) 10 MG TABLET    Take 10 mg by mouth daily    CLONIDINE (CATAPRES) 0.1 MG TABLET    Take 1 tablet by mouth 2 times daily    PROMETHAZINE (PHENERGAN) 25 MG TABLET    Take 25 mg by mouth 4 times daily as needed for Agitation       ALLERGIES     Patient has no known allergies.     FAMILY HISTORY       Family History   Problem Relation Age of Onset    Diabetes Mother     Coronary Art Dis Father           SOCIAL HISTORY       Social History     Socioeconomic History    Marital status: Legally      Spouse name: None    Number of children: 2    Years of education: None    Highest education level: None   Occupational History    None   Tobacco Use    Smoking status: Current Every Day Smoker     Packs/day: 0.50     Types: Cigarettes    Smokeless tobacco: Never Used   Vaping Use    Vaping Use: Never used   Substance and Sexual Activity    Alcohol use: Not Currently     Comment: 60-70 ounces of beer per day in the past    Drug use: No    Sexual activity: Yes   Other Topics Concern    None   Social History Narrative    None     Social Determinants of Health Financial Resource Strain:     Difficulty of Paying Living Expenses: Not on file   Food Insecurity:     Worried About Running Out of Food in the Last Year: Not on file    Masood of Food in the Last Year: Not on file   Transportation Needs:     Lack of Transportation (Medical): Not on file    Lack of Transportation (Non-Medical): Not on file   Physical Activity:     Days of Exercise per Week: Not on file    Minutes of Exercise per Session: Not on file   Stress:     Feeling of Stress : Not on file   Social Connections:     Frequency of Communication with Friends and Family: Not on file    Frequency of Social Gatherings with Friends and Family: Not on file    Attends Zoroastrian Services: Not on file    Active Member of [address] [place] [address] mkt[employer] or Organizations: Not on file    Attends Club or Organization Meetings: Not on file    Marital Status: Not on file   Intimate Partner Violence:     Fear of Current or Ex-Partner: Not on file    Emotionally Abused: Not on file    Physically Abused: Not on file    Sexually Abused: Not on file   Housing Stability:     Unable to Pay for Housing in the Last Year: Not on file    Number of [place] in the Last Year: Not on file    Unstable Housing in the Last Year: Not on file       SCREENINGS      Heart Score for chest pain patients  History: Slightly Suspicious  ECG: Normal  Patient Age: > 39 and < 65 years  *Risk factors for Atherosclerotic disease: Cigarette smoking, Hypercholesterolemia, Hypertension, Positive family History  Risk Factors: > 3 Risk factors or history of atherosclerotic disease*  Troponin: < 1X normal limit  Heart Score Total: 3      PHYSICAL EXAM    (up to 7 for level 4, 8 or more for level 5)     ED Triage Vitals   BP Temp Temp src Pulse Resp SpO2 Height Weight   -- -- -- -- -- -- -- --         Physical Exam   Constitutional: Awake and alert. Very pleasant. Mild discomfort. Head: No visible evidence of trauma. Normocephalic.   Eyes: Pupils equal and reactive. No photophobia. Conjunctiva normal.    HENT: Oral mucosa moist.  Airway patent. Pharynx without erythema. Nares were clear. Neck:  Soft and supple. Nontender. Heart:  Regular rate and rhythm. No murmur. Lungs:  Clear to auscultation. No wheezes, rales, or ronchi. No conversational dyspnea or accessory muscle use. Chest: No tenderness to the left lower anterior lateral chest over ribs 6 and 7 at the costosternal border. No crepitus deformity or step-off. No rash. No evidence of trauma. Abdomen:  Soft, nondistended, bowel sounds present. Nontender. No guarding rigidity or rebound. No masses. Musculoskeletal: Extremities non-tender with full range of motion. Radial and dorsalis pedis pulses were intact. No calf tenderness erythema or edema. Neurological: Alert and oriented x 3. Speech clear. Cranial nerves II-XII intact. No facial droop. No acute focal motor or sensory deficits. Skin: Skin is warm and dry. No rash. Lymphatic:  No lympadenopathy. Psychiatric: Normal mood and affect. Behavior is normal.         DIAGNOSTIC RESULTS     EKG: All EKG's are interpreted by the Emergency Department Physician who either signs or Co-signs this chart in the absence of a cardiologist.    Normal sinus rhythm. Rate 72. NM interval 184 ms. QRS duration 110 ms. QTc 431 ms.  R axis -29 degrees. No ST elevation. EKG was unchanged from 6/8/2020. Normal EKG. RADIOLOGY:   Non-plain film images such as CT, Ultrasound and MRI are read by the radiologist. Plain radiographic images are visualized and preliminarily interpreted by the emergency physician with the below findings:        Interpretation per the Radiologist below, if available at the time of this note:    XR CHEST PORTABLE   Final Result   No evidence of acute cardiopulmonary disease.                ED BEDSIDE ULTRASOUND:   Performed by ED Physician - none    LABS:  Labs Reviewed   CBC WITH AUTO DIFFERENTIAL - Abnormal; Notable for the following components:       Result Value    Hematocrit 40.2 (*)     All other components within normal limits    Narrative:     Performed at:  [facility]  [address], [place] [facility]   Phone [phone]   COMPREHENSIVE METABOLIC PANEL W/ REFLEX TO MG FOR LOW K - Abnormal; Notable for the following components:    CREATININE 0.6 (*)     All other components within normal limits    Narrative:     Performed at:  [facility]  [address], [place]   Phone [phone]   TROPONIN    Narrative:     Performed at:  [facility]  [address], [place] [facility]   Phone [phone]    Narrative:     Performed at:  [facility]  [address], [facility]   Phone [phone]   D-DIMER, QUANTITATIVE    Narrative:     Performed at:  [facility]  [address], [place]   Phone [phone]   TROPONIN    Narrative:     Performed at:  [facility]  [address], [place] [facility]   Phone [phone]       All other labs were within normal range or not returned as of this dictation. EMERGENCY DEPARTMENT COURSE and DIFFERENTIAL DIAGNOSIS/MDM:   Vitals:    Vitals:    12/12/21 0115 12/12/21 0200 12/12/21 0245 12/12/21 0315   BP: (!) 134/96 (!) 132/92 (!) 138/94 (!) 132/94   Pulse: 71 72 68 72   Resp: 19 20 21 21   Temp:       TempSrc:       SpO2: 97% 97% 97% 98%   Weight:       Height:             MDM      Patient presents with sharp midsternal chest pain as noted above that began 45 minutes prior to arrival.  His pain is slightly reproducible with palpation. EKG was obtained which reveals normal sinus rhythm. Normal EKG.   He was given aspirin 324 mg p.o. and nitroglycerin 0.4 mg sublingually. REASSESSMENT          11:57 PM: There was no change with nitroglycerin. Troponin is normal.  D-dimer is normal.  Heart score is three. I talked with the patient at length. Suspicion for acute coronary syndrome is very low. Pain is pleuritic and reproducible with palpation. However, he does have multiple risk factors. He will need further evaluation with stress test to rule out an ischemic cause of his pain. I recommended admission with repeat serial cardiac enzymes and stress test tomorrow. However, he does not want to be admitted to the hospital.  He was advised of the risk of delaying diagnosis, worsening of his condition, myocardial infarction, or even sudden cardiac death. He is competent to make this decision. He demonstrated appropriate decision-making capacity. He is willing to stay for repeat 3-hour troponin at 2 AM.  If repeat 3-hour troponin is negative, he will be discharged to home. He was advised to call his primary care physician on Monday morning to schedule a stress test to be done next week. If his condition worsens or new symptoms develop, he was advised to return immediately to the emergency department. 3:25 AM: Repeat troponin is negative. CRITICAL CARE TIME   Total Critical Care time was 0 minutes, excluding separately reportable procedures. There was a high probability of clinically significant/life threatening deterioration in the patient's condition which required my urgent intervention. CONSULTS:  None    PROCEDURES:  Unless otherwise noted below, none     Procedures        FINAL IMPRESSION      1. Chest pain, unspecified type          DISPOSITION/PLAN   DISPOSITION        PATIENT REFERRED TO:  [facility]) Referral  Call [phone] for an appointment  Call today        DISCHARGE MEDICATIONS:  New Prescriptions    No medications on file     Controlled Substances Monitoring:     No flowsheet data found.     (Please note that portions of this note were completed with a voice recognition program.  Efforts were made to edit the dictations but occasionally words are mis-transcribed. )    [id number] Chance Coats DO (electronically signed)  Attending Emergency Physician          Akhil Frank DO  12/12/21 3420

## 2021-12-12 NOTE — ED NOTES
Provider order placed for patient's discharge. Provider reviewed decision to discharge with the patient. Discharge paperwork and any prescriptions were reviewed with the patient. Patient verbalized understanding of discharge education and any prescriptions and has no further questions or further needs at this time. Patient left with all personal belongings and was stable upon departure. Patient thanked for choosing [facility] ([facility]) and informed to return should any need arise.        Tereso Montgomery RN  12/12/21 2280

## 2022-05-02 ENCOUNTER — OFFICE VISIT (OUTPATIENT)
Dept: PRIMARY CARE CLINIC | Age: 52
End: 2022-05-02
Payer: COMMERCIAL

## 2022-05-02 VITALS
HEIGHT: 69 IN | SYSTOLIC BLOOD PRESSURE: 129 MMHG | TEMPERATURE: 97.5 F | DIASTOLIC BLOOD PRESSURE: 96 MMHG | WEIGHT: 202 LBS | OXYGEN SATURATION: 98 % | HEART RATE: 75 BPM | BODY MASS INDEX: 29.92 KG/M2

## 2022-05-02 DIAGNOSIS — Z87.19 HISTORY OF COLONIC DIVERTICULITIS: Primary | ICD-10-CM

## 2022-05-02 DIAGNOSIS — Z00.00 ROUTINE ADULT HEALTH MAINTENANCE: ICD-10-CM

## 2022-05-02 DIAGNOSIS — I10 PRIMARY HYPERTENSION: ICD-10-CM

## 2022-05-02 DIAGNOSIS — R07.89 ATYPICAL CHEST PAIN: ICD-10-CM

## 2022-05-02 DIAGNOSIS — N50.82 SCROTAL PAIN: ICD-10-CM

## 2022-05-02 DIAGNOSIS — F17.210 CIGARETTE NICOTINE DEPENDENCE WITHOUT COMPLICATION: ICD-10-CM

## 2022-05-02 LAB
BACTERIA: NORMAL /HPF
BILIRUBIN URINE: NEGATIVE
BLOOD, URINE: NEGATIVE
CLARITY: CLEAR
COLOR: YELLOW
EPITHELIAL CELLS, UA: 0 /HPF (ref 0–5)
GLUCOSE URINE: NEGATIVE MG/DL
HYALINE CASTS: 0 /LPF (ref 0–8)
KETONES, URINE: NEGATIVE MG/DL
LEUKOCYTE ESTERASE, URINE: NEGATIVE
MICROSCOPIC EXAMINATION: NORMAL
NITRITE, URINE: NEGATIVE
PH UA: 6 (ref 5–8)
PROTEIN UA: NEGATIVE MG/DL
RBC UA: 1 /HPF (ref 0–4)
SPECIFIC GRAVITY UA: 1.02 (ref 1–1.03)
URINE TYPE: NORMAL
UROBILINOGEN, URINE: 0.2 E.U./DL
WBC UA: 1 /HPF (ref 0–5)

## 2022-05-02 PROCEDURE — 99203 OFFICE O/P NEW LOW 30 MIN: CPT | Performed by: INTERNAL MEDICINE

## 2022-05-02 RX ORDER — PANTOPRAZOLE SODIUM 20 MG/1
20 TABLET, DELAYED RELEASE ORAL DAILY
Qty: 30 TABLET | Refills: 3 | Status: SHIPPED | OUTPATIENT
Start: 2022-05-02 | End: 2022-07-15

## 2022-05-02 RX ORDER — AMLODIPINE BESYLATE 10 MG/1
10 TABLET ORAL DAILY
Qty: 90 TABLET | Refills: 1 | Status: SHIPPED | OUTPATIENT
Start: 2022-05-02 | End: 2022-07-15 | Stop reason: SDUPTHER

## 2022-05-02 SDOH — ECONOMIC STABILITY: FOOD INSECURITY: WITHIN THE PAST 12 MONTHS, THE FOOD YOU BOUGHT JUST DIDN'T LAST AND YOU DIDN'T HAVE MONEY TO GET MORE.: NEVER TRUE

## 2022-05-02 SDOH — ECONOMIC STABILITY: FOOD INSECURITY: WITHIN THE PAST 12 MONTHS, YOU WORRIED THAT YOUR FOOD WOULD RUN OUT BEFORE YOU GOT MONEY TO BUY MORE.: NEVER TRUE

## 2022-05-02 ASSESSMENT — PATIENT HEALTH QUESTIONNAIRE - PHQ9
SUM OF ALL RESPONSES TO PHQ9 QUESTIONS 1 & 2: 0
SUM OF ALL RESPONSES TO PHQ QUESTIONS 1-9: 0
2. FEELING DOWN, DEPRESSED OR HOPELESS: 0
SUM OF ALL RESPONSES TO PHQ QUESTIONS 1-9: 0
1. LITTLE INTEREST OR PLEASURE IN DOING THINGS: 0
SUM OF ALL RESPONSES TO PHQ QUESTIONS 1-9: 0
SUM OF ALL RESPONSES TO PHQ QUESTIONS 1-9: 0

## 2022-05-02 ASSESSMENT — SOCIAL DETERMINANTS OF HEALTH (SDOH): HOW HARD IS IT FOR YOU TO PAY FOR THE VERY BASICS LIKE FOOD, HOUSING, MEDICAL CARE, AND HEATING?: NOT HARD AT ALL

## 2022-05-02 NOTE — PATIENT INSTRUCTIONS
1, urine test today  2, fasting blood work at your convenience, at least 4 days before f/u appt  3, referrals to urology and cardiology  4, trial protonix 20mg daily for your chest pain  5, check BP  5-10x before next appt.  Bring record to appt

## 2022-05-02 NOTE — PROGRESS NOTES
2022    Roby Mckinnon (:  1970) is a 49 y.o. male, here for evaluation of the following medical concerns:    Chief Complaint   Patient presents with    New Patient       HPI  61-year-old male history of hypertension on clonidine amlodipine, 10-pack-year nicotine dependence,alcoholism previously on naltrexone, history of homelessness sleeping on mother's couch intermittently,  previously seen at [place] [id number], who comes in with several concerns to establish care:  #1 pelvic pain. Patient reports sigmoid colectomy for complicated diverticulitis in . Now experiences pain when he has to hold his urine, and when he stools he will see a gel discharge from his bladder. Saw Dr. Nieves Guzman at [facility] 2021:  \"he feels like he has got something eating the inside of his urinary tract. I do not know what that means and he has no objective abnormality on PSA, urinalysis or PVR. He does complain of erectile dysfunction. 5 mg of Cialis can help ED and can help voiding I will prescribe it and see what happens. \"  CT 19:FINDINGS:     \"Evaluation of the lower chest demonstrates minimal dependent atelectasis. The lung bases are otherwise clear. There is diffuse hepatic steatosis. There is no evidence of biliary ductal dilation. The gallbladder is normal in CT appearance. The spleen, pancreas and adrenal glands are normal.     The kidneys demonstrate symmetric enhancement with intravenous contrast. There is no evidence of collecting system dilation. There is no evidence of retroperitoneal or pelvic adenopathy. Evaluation of the GI tract demonstrates an anastomotic suture line at the rectosigmoid junction. This most likely reflects a prior sigmoidectomy. There are scattered colonic diverticula. There is no evidence of diverticulitis. A normal appendix is visualized.  There are prominent vessels within the cecum (best seen on coronal image 69 of series 602), raising the possibility of angiodysplasia in this region. There is no evidence of diverticulitis. There is minimal free fluid seen within the pelvis. The bladder, prostate and seminal vesicles are normal in contour. No suspicious osseous lesions are evident. \"    #2. Atypical chest pain. For the last 6 months patient reports achy chest pain lasting for days, occurring nearly weekly. Endorses some solid food dysphagia but no melena. Denies relation to food position exertion deep breathing. Seen in the ED December 12, 2021 for sharp substernal pain, unrelieved by nitroglycerin and aspirin. EKG serial troponin BNP D-dimer CBC CMP chest x-ray unremarkable. Patient has hypertension and nicotine dependence half pack per day. Has not seen cardiology. #3  Routine adult health maintenance. He brings in employer form indicating he needs his fasting glucose and cholesterol checked. #4 essential hypertension. Used to take clonidine and amlodipine, for some reason stop the clonidine and blood pressure elevated today. Does not check blood pressure at home. Review of Systems   Constitutional: Negative for activity change, appetite change, fatigue and unexpected weight change. HENT: Negative for dental problem, sinus pain, sore throat and trouble swallowing. Eyes: Negative for pain and visual disturbance. Respiratory: Negative for apnea, cough, chest tightness, shortness of breath and wheezing. Cardiovascular: Negative for chest pain and palpitations. Gastrointestinal: Negative for abdominal pain, blood in stool, constipation, diarrhea, nausea, rectal pain and vomiting. Endocrine: Negative for cold intolerance, heat intolerance, polydipsia, polyphagia and polyuria.    Genitourinary: Negative for difficulty urinating, dysuria, flank pain, frequency, hematuria, pelvic pain, urgency,  Musculoskeletal: Negative for arthralgias, back pain, gait problem, joint swelling, myalgias, neck pain and neck stiffness. Skin: Negative for color change and rash. Neurological: Negative for dizziness, tremors, syncope, speech difficulty, weakness, light-headedness and headaches. Hematological: Negative for adenopathy. Does not bruise/bleed easily. Psychiatric/Behavioral: Negative for agitation, behavioral problems, decreased concentration, sleep disturbance and suicidal ideas. The patient is not nervous/anxious and is not hyperactive. Prior to Visit Medications    Medication Sig Taking?  Authorizing Provider   pantoprazole (PROTONIX) 20 MG tablet Take 1 tablet by mouth daily Yes Abril Shepherd MD   amLODIPine (NORVASC) 10 MG tablet Take 1 tablet by mouth daily Yes Abril Shepherd MD        No Known Allergies    Past Medical History:   Diagnosis Date    Alcohol abuse     Depression     Diverticulitis     Hyperlipidemia     Hypertension        Past Surgical History:   Procedure Laterality Date    COLECTOMY      HAND SURGERY Right 9/2/14    Middle finger revision amputation       Social History     Socioeconomic History    Marital status: Legally      Spouse name: Not on file    Number of children: 2    Years of education: Not on file    Highest education level: Not on file   Occupational History    Not on file   Tobacco Use    Smoking status: Current Every Day Smoker     Packs/day: 0.50     Types: Cigarettes    Smokeless tobacco: Never Used   Vaping Use    Vaping Use: Never used   Substance and Sexual Activity    Alcohol use: Not Currently     Comment: 60-70 ounces of beer per day in the past    Drug use: No    Sexual activity: Yes   Other Topics Concern    Not on file   Social History Narrative    Not on file     Social Determinants of Health     Financial Resource Strain: Low Risk     Difficulty of Paying Living Expenses: Not hard at all   Food Insecurity: No Food Insecurity    Worried About Running Out of Food in the Last Year: Never true    [address] in the Last Year: Never true   Transportation Needs:     Lack of Transportation (Medical): Not on file    Lack of Transportation (Non-Medical): Not on file   Physical Activity:     Days of Exercise per Week: Not on file    Minutes of Exercise per Session: Not on file   Stress:     Feeling of Stress : Not on file   Social Connections:     Frequency of Communication with Friends and Family: Not on file    Frequency of Social Gatherings with Friends and Family: Not on file    Attends Catholic Services: Not on file    Active Member of [address] [place] [address] [employer] or Organizations: Not on file    Attends Club or Organization Meetings: Not on file    Marital Status: Not on file   Intimate Partner Violence:     Fear of Current or Ex-Partner: Not on file    Emotionally Abused: Not on file    Physically Abused: Not on file    Sexually Abused: Not on file   Housing Stability:     Unable to Pay for Housing in the Last Year: Not on file    Number of [place] in the Last Year: Not on file    Unstable Housing in the Last Year: Not on file        Family History   Problem Relation Age of Onset    Diabetes Mother     Coronary Art Dis Father        Vitals:    05/02/22 0834   BP: (!) 129/96   Pulse: 75   Temp: 97.5 °F (36.4 °C)   TempSrc: Temporal   SpO2: 98%   Weight: 202 lb (91.6 kg)   Height: 5' 9\" (1.753 m)     Estimated body mass index is 29.83 kg/m² as calculated from the following:    Height as of this encounter: 5' 9\" (1.753 m). Weight as of this encounter: 202 lb (91.6 kg). PHYSICAL EXAM  GENERAL:  Pleasant tired appearing  americanmale who looks his stated age, awake alert and oriented x3, no acute distress. HEENT:  Normocephalic atraumatic. Pupils equal round reactive light and accommodation, extra ocular muscles are intact. Oropharynx is clear moist without injection or exudate. Tongue and palate move normally. Turbinates appear normal.  Tympanic membranes appear normal.  NECK:  Supple nontender. No carotid bruits. Brisk carotid upstrokes, no JVD. No thyromegaly. LYMPH:  No supraclavicular cervical axillary or inguinal lymphadenopathy. LUNGS:  Clear to auscultation bilaterally. Very good air entry. No inspiratory crackles or expiratory wheezes. HEART:  Regular rate and rhythm without pathologic murmur rub gallop S3 or S4. ABDOMEN:  Soft, nontender. Normal bowel sounds. No guarding. No masses. UROGENITAL:  Normal testicular exam.  EXTREMITIES:  Warm and well perfused without clubbing cyanosis or edema. 2+ pulses in all 4 extremities. Capillary refill less than 2 seconds. NEURO:  Cranial nerves 2-12 grossly intact. Normal muscle bulk and tone. No resting tremor, cogwheeling, normal rapid alternating movements in the hands and feet. No stocking paresthesia. Normal gait and station. MUSCULOSKELETAL:  Mild osteoarthritic changes. SKIN:  No worrisome lesions, skin a little dry. PSYCH:  No psychomotor retardation or agitation. Good eye contact. Unrestricted affect range. Mood congruent with affect. Linear thought.     LABS  Lab Review   Admission on 12/11/2021, Discharged on 12/12/2021   Component Date Value    Ventricular Rate 12/11/2021 72     Atrial Rate 12/11/2021 72     P-R Interval 12/11/2021 184     QRS Duration 12/11/2021 110     Q-T Interval 12/11/2021 394     QTc Calculation (Bazett) 12/11/2021 431     P Axis 12/11/2021 35     R Axis 12/11/2021 -29     T Axis 12/11/2021 4     Diagnosis 12/11/2021 Normal sinus rhythmLeft axis deviationWhen compared with ECG of 08-JUN-2020 17:12,No significant change was foundConfirmed by Dede Pacheco MD, Priya Bellamy ([id number]) on 12/12/2021 3:33:02 PM     WBC 12/11/2021 4.8     RBC 12/11/2021 4.38     Hemoglobin 12/11/2021 13.8     Hematocrit 12/11/2021 40.2*    MCV 12/11/2021 91.8     MCH 12/11/2021 31.4     MCHC 12/11/2021 34.2     RDW 12/11/2021 13.5     Platelets 91/40/9967 173     MPV 12/11/2021 7.7     Neutrophils % 12/11/2021 47.9     Lymphocytes % 12/11/2021 36.0     Monocytes % 12/11/2021 8.6     Eosinophils % 12/11/2021 6.5     Basophils % 12/11/2021 1.0     Neutrophils Absolute 12/11/2021 2.3     Lymphocytes Absolute 12/11/2021 1.7     Monocytes Absolute 12/11/2021 0.4     Eosinophils Absolute 12/11/2021 0.3     Basophils Absolute 12/11/2021 0.0     Sodium 12/11/2021 140     Potassium reflex Magnesi* 12/11/2021 3.7     Chloride 12/11/2021 105     CO2 12/11/2021 28     Anion Gap 12/11/2021 7     Glucose 12/11/2021 96     BUN 12/11/2021 14     CREATININE 12/11/2021 0.6*    GFR Non- 12/11/2021 >60     GFR  12/11/2021 >60     Calcium 12/11/2021 9.0     Total Protein 12/11/2021 6.9     Albumin 12/11/2021 3.9     Albumin/Globulin Ratio 12/11/2021 1.3     Total Bilirubin 12/11/2021 0.7     Alkaline Phosphatase 12/11/2021 65     ALT 12/11/2021 19     AST 12/11/2021 26     Troponin 12/11/2021 <0.01     Pro-BNP 12/11/2021 45     D-Dimer, Quant 12/11/2021 <200     Troponin 12/12/2021 <0.01    Admission on 03/30/2021, Discharged on 04/02/2021   Component Date Value    WBC 03/30/2021 2.5*    RBC 03/30/2021 4.63     Hemoglobin 03/30/2021 15.0     Hematocrit 03/30/2021 44.4     MCV 03/30/2021 95.9     MCH 03/30/2021 32.4     MCHC 03/30/2021 33.8     RDW 03/30/2021 15.1     Platelets 60/72/8560 89*    MPV 03/30/2021 7.5     PLATELET SLIDE REVIEW 03/30/2021 Decreased     SLIDE REVIEW 03/30/2021 see below     Neutrophils % 03/30/2021 39.5     Lymphocytes % 03/30/2021 44.3     Monocytes % 03/30/2021 13.1     Eosinophils % 03/30/2021 2.2     Basophils % 03/30/2021 0.9     Neutrophils Absolute 03/30/2021 1.0*    Lymphocytes Absolute 03/30/2021 1.1     Monocytes Absolute 03/30/2021 0.3     Eosinophils Absolute 03/30/2021 0.1     Basophils Absolute 03/30/2021 0.0     Sodium 03/30/2021 143     Potassium reflex Magnesi* 03/30/2021 4.2     Chloride 03/30/2021 103     CO2 03/30/2021 28     Anion Gap 03/30/2021 12     Glucose 03/30/2021 93     BUN 03/30/2021 4*    CREATININE 03/30/2021 0.7*    GFR Non- 03/30/2021 >60     GFR  03/30/2021 >60     Calcium 03/30/2021 9.3     Total Protein 03/30/2021 8.3*    Albumin 03/30/2021 4.2     Albumin/Globulin Ratio 03/30/2021 1.0*    Total Bilirubin 03/30/2021 0.5     Alkaline Phosphatase 03/30/2021 75     ALT 03/30/2021 277*    AST 03/30/2021 254*    Globulin 03/30/2021 4.1     Color, UA 03/30/2021 Yellow     Clarity, UA 03/30/2021 Clear     Glucose, Ur 03/30/2021 Negative     Bilirubin Urine 03/30/2021 Negative     Ketones, Urine 03/30/2021 Negative     Specific Gravity, UA 03/30/2021 1.010     Blood, Urine 03/30/2021 Negative     pH, UA 03/30/2021 6.0     Protein, UA 03/30/2021 Negative     Urobilinogen, Urine 03/30/2021 0.2     Nitrite, Urine 03/30/2021 Negative     Leukocyte Esterase, Urine 03/30/2021 Negative     Microscopic Examination 03/30/2021 Not Indicated     Urine Type 03/30/2021 NotGiven     Urine Reflex to Culture 03/30/2021 Not Indicated     Amphetamine Screen, Urine 03/30/2021 Neg     Barbiturate Screen, Ur 03/30/2021 Neg     Benzodiazepine Screen, U* 03/30/2021 Neg     Cannabinoid Scrn, Ur 03/30/2021 Neg     Cocaine Metabolite Scree* 03/30/2021 Neg     Opiate Scrn, Ur 03/30/2021 Neg     PCP Screen, Urine 03/30/2021 Neg     Methadone Screen, Urine 03/30/2021 Neg     Propoxyphene Scrn, Ur 03/30/2021 Neg     Oxycodone Urine 03/30/2021 Neg     pH, UA 03/30/2021 6.0     Drug Screen Comment: 03/30/2021 see below     Acetaminophen Level 03/30/2021 <5*    Ethanol Lvl 78/14/6049 397     Salicylate, Serum 51/40/5306 <0.3*    Protime 03/30/2021 12.5     INR 03/30/2021 1.08     SARS-CoV-2, NAAT 03/30/2021 Not Detected     Hep A IgM 03/30/2021 Non-reactive     Hep B Core Ab, IgM 03/30/2021 Non-reactive     Hep B S Ag Interp 03/30/2021 Non-reactive     Hep C Ab Interp 03/30/2021 Non-reactive     Vitamin B-12 03/30/2021 613     Folate 03/30/2021 9.77     Sodium 03/31/2021 135*    Potassium reflex Magnesi* 03/31/2021 3.8     Chloride 03/31/2021 96*    CO2 03/31/2021 29     Anion Gap 03/31/2021 10     Glucose 03/31/2021 115*    BUN 03/31/2021 8     CREATININE 03/31/2021 0.8*    GFR Non- 03/31/2021 >60     GFR  03/31/2021 >60     Calcium 03/31/2021 9.1     Total Protein 03/31/2021 7.2     Albumin 03/31/2021 3.6     Albumin/Globulin Ratio 03/31/2021 1.0*    Total Bilirubin 03/31/2021 0.6     Alkaline Phosphatase 03/31/2021 91     ALT 03/31/2021 202*    AST 03/31/2021 165*    Globulin 03/31/2021 3.6     WBC 03/31/2021 2.8*    RBC 03/31/2021 4.20     Hemoglobin 03/31/2021 13.7     Hematocrit 03/31/2021 40.2*    MCV 03/31/2021 95.6     MCH 03/31/2021 32.6     MCHC 03/31/2021 34.1     RDW 03/31/2021 15.1     Platelets 21/18/0649 78*    MPV 03/31/2021 7.8     Neutrophils % 03/31/2021 47.8     Lymphocytes % 03/31/2021 38.2     Monocytes % 03/31/2021 10.4     Eosinophils % 03/31/2021 2.5     Basophils % 03/31/2021 1.1     Neutrophils Absolute 03/31/2021 1.3*    Lymphocytes Absolute 03/31/2021 1.1     Monocytes Absolute 03/31/2021 0.3     Eosinophils Absolute 03/31/2021 0.1     Basophils Absolute 03/31/2021 0.0     Sodium 04/01/2021 137     Potassium reflex Magnesi* 04/01/2021 3.5     Chloride 04/01/2021 103     CO2 04/01/2021 26     Anion Gap 04/01/2021 8     Glucose 04/01/2021 119*    BUN 04/01/2021 8     CREATININE 04/01/2021 0.7*    GFR Non- 04/01/2021 >60     GFR  04/01/2021 >60     Calcium 04/01/2021 9.0     Total Protein 04/01/2021 6.8     Albumin 04/01/2021 3.4     Albumin/Globulin Ratio 04/01/2021 1.0*    Total Bilirubin 04/01/2021 0.7     Alkaline Phosphatase 04/01/2021 88     ALT 04/01/2021 140*    AST 04/01/2021 109*    Globulin 04/01/2021 3.4     WBC 04/01/2021 2.1*  RBC 04/01/2021 4.21     Hemoglobin 04/01/2021 13.7     Hematocrit 04/01/2021 40.7     MCV 04/01/2021 96.5     MCH 04/01/2021 32.5     MCHC 04/01/2021 33.6     RDW 04/01/2021 14.7     Platelets 67/43/0185 67*    MPV 04/01/2021 8.4     Magnesium 04/01/2021 1.70*         ASSESSMENT/PLAN  1. Atypical chest pain  Reassuring evaluation December 2021 in the ED. We will start Protonix 20 mg daily. Will refer for consideration of stress test to cardiology given hypertension smoking history. - Didi Montemayor MD, Cardiology, [facility]  - C-Reactive Protein; Future    2. Cigarette nicotine dependence without complication  Discuss cessation strategies at follow-up. Contemplative. 3. Primary hypertension  Suboptimal control in the office. We will obtain out patient readings and bring record to follow-up appointment. Screen for endorgan damage.  - MICROALBUMIN / CREATININE URINE RATIO; Future  - Basic Metabolic Panel; Future    4. Routine adult health maintenance  Explore colonoscopy on return. Tdap 2014, Pneumovax 23 2015, eligible for [employer] booster #1.  - Lipid Panel; Future  - AST; Future  - ALT; Future  - Hemoglobin A1C; Future  - Basic Metabolic Panel; Future  - TSH with Reflex; Future  - PSA, Total and Free; Future  - Vitamin D 25 Hydroxy; Future  - HIV Screen; Future  - Hep C AB RLFX HCV PCR-A; Future    5. Scrotal pain  Recheck UA apparently normal last year at [facility]. Wonder about cystoscopy colovesicular fistula. Had no worrisome findings on CT in 2019, perhaps cystoscopy. - Urinalysis with Microscopic; Future  - Culture, Urine; Future  - AFL - Caroline Coe MD, Urology, [facility]    6. History of colonic diverticulitis  Evidence of sigmoid colectomy on CT 2019. Due for colonoscopy this year. Return in about 4 weeks (around 5/30/2022). It was a pleasure to visit with Mr. Skyler Shearer today. Answered all questions as best I could.   Hattie Garcia MD   Time 22 minutes

## 2022-05-05 LAB — URINE CULTURE, ROUTINE: NORMAL

## 2022-05-23 ENCOUNTER — OFFICE VISIT (OUTPATIENT)
Dept: CARDIOLOGY CLINIC | Age: 52
End: 2022-05-23
Payer: COMMERCIAL

## 2022-05-23 VITALS
HEART RATE: 77 BPM | DIASTOLIC BLOOD PRESSURE: 88 MMHG | HEIGHT: 69 IN | SYSTOLIC BLOOD PRESSURE: 134 MMHG | BODY MASS INDEX: 29.56 KG/M2 | OXYGEN SATURATION: 98 % | WEIGHT: 199.6 LBS

## 2022-05-23 DIAGNOSIS — R07.89 ATYPICAL CHEST PAIN: Primary | ICD-10-CM

## 2022-05-23 DIAGNOSIS — F17.210 CIGARETTE NICOTINE DEPENDENCE WITHOUT COMPLICATION: ICD-10-CM

## 2022-05-23 DIAGNOSIS — I10 PRIMARY HYPERTENSION: ICD-10-CM

## 2022-05-23 DIAGNOSIS — I10 ESSENTIAL HYPERTENSION: ICD-10-CM

## 2022-05-23 DIAGNOSIS — R07.89 ATYPICAL CHEST PAIN: ICD-10-CM

## 2022-05-23 DIAGNOSIS — Z00.00 ROUTINE ADULT HEALTH MAINTENANCE: ICD-10-CM

## 2022-05-23 LAB
ALT SERPL-CCNC: 14 U/L (ref 10–40)
ANION GAP SERPL CALCULATED.3IONS-SCNC: 14 MMOL/L (ref 3–16)
AST SERPL-CCNC: 19 U/L (ref 15–37)
BUN BLDV-MCNC: 13 MG/DL (ref 7–20)
C-REACTIVE PROTEIN: <3 MG/L (ref 0–5.1)
CALCIUM SERPL-MCNC: 9.3 MG/DL (ref 8.3–10.6)
CHLORIDE BLD-SCNC: 105 MMOL/L (ref 99–110)
CHOLESTEROL, TOTAL: 189 MG/DL (ref 0–199)
CO2: 21 MMOL/L (ref 21–32)
CREAT SERPL-MCNC: 0.7 MG/DL (ref 0.9–1.3)
GFR AFRICAN AMERICAN: >60
GFR NON-AFRICAN AMERICAN: >60
GLUCOSE BLD-MCNC: 100 MG/DL (ref 70–99)
HDLC SERPL-MCNC: 54 MG/DL (ref 40–60)
LDL CHOLESTEROL CALCULATED: 117 MG/DL
POTASSIUM SERPL-SCNC: 4.2 MMOL/L (ref 3.5–5.1)
SODIUM BLD-SCNC: 140 MMOL/L (ref 136–145)
TRIGL SERPL-MCNC: 89 MG/DL (ref 0–150)
TSH REFLEX: 3.8 UIU/ML (ref 0.27–4.2)
VLDLC SERPL CALC-MCNC: 18 MG/DL

## 2022-05-23 PROCEDURE — 99204 OFFICE O/P NEW MOD 45 MIN: CPT | Performed by: INTERNAL MEDICINE

## 2022-05-23 RX ORDER — CLONIDINE HYDROCHLORIDE 0.1 MG/1
0.1 TABLET ORAL 2 TIMES DAILY
COMMUNITY
End: 2022-07-15

## 2022-05-23 NOTE — PATIENT INSTRUCTIONS

## 2022-05-23 NOTE — PROGRESS NOTES
[facility], [place], operated by [facility]      Cardiology Consult    [facility] [place] [facility]  1970    May 23, 2022    Referring Physician: Gurjit Echeverria MD  Reason for Referral: Chest pain     CC: \"I've had chest pains off and on. \"     HPI:  The patient is 47 y.o. male with a past medical history significant for hypertension, prior alcohol abuse, and nicotine addiction who presents today for evaluation of nonexertional chest pains. He reports chronic chest pains for years that typically occur after eating. At times the pains can last for days. He states he was stared on Protonix and notes improvement. He denies any exertional chest pains or worsening shortness of breath. He states he continues to remain active doing yard work without any exertional symptoms. He reports compliance with his medications and tolerating. He denies any abnormal bleeding or bruising. Patient denies exertional chest pain/pressure, dyspnea at rest, worsening NÚÑEZ, PND, orthopnea, palpitations, lightheadedness, weight changes, LE edema, and syncope.     Past Medical History:   Diagnosis Date    Alcohol abuse     Depression     Diverticulitis     Hyperlipidemia     Hypertension      Past Surgical History:   Procedure Laterality Date    COLECTOMY      HAND SURGERY Right 9/2/14    Middle finger revision amputation     Family History   Problem Relation Age of Onset    Diabetes Mother     Coronary Art Dis Father      Social History     Tobacco Use    Smoking status: Current Every Day Smoker     Packs/day: 0.50     Types: Cigarettes    Smokeless tobacco: Never Used   Vaping Use    Vaping Use: Never used   Substance Use Topics    Alcohol use: Not Currently     Comment: 60-70 ounces of beer per day in the past    Drug use: No       No Known Allergies  Current Outpatient Medications   Medication Sig Dispense Refill    cloNIDine (CATAPRES) 0.1 MG tablet Take 0.1 mg by mouth 2 times daily      pantoprazole (PROTONIX) 20 MG tablet Take 1 tablet by mouth daily 30 tablet 3    amLODIPine (NORVASC) 10 MG tablet Take 1 tablet by mouth daily 90 tablet 1     No current facility-administered medications for this visit. Review of Systems:  · Constitutional: No unanticipated weight loss. There's been no change in energy level, sleep pattern, or activity level. No fevers, chills. · Eyes: No visual changes or diplopia. No scleral icterus. · ENT: No Headaches, hearing loss or vertigo. No mouth sores or sore throat. · Cardiovascular: as reviewed in HPI  · Respiratory: No cough or wheezing, no sputum production. No hemoptysis. · Gastrointestinal: No abdominal pain, appetite loss, blood in stools. No change in bowel or bladder habits. · Genitourinary: No dysuria, trouble voiding, or hematuria. · Musculoskeletal:  No gait disturbance, no joint complaints. · Integumentary: No rash or pruritis. · Neurological: No headache, diplopia, change in muscle strength, numbness or tingling. · Psychiatric: No anxiety or depression. · Endocrine: No temperature intolerance. No excessive thirst, fluid intake, or urination. No tremor. · Hematologic/Lymphatic: No abnormal bruising or bleeding, blood clots or swollen lymph nodes. · Allergic/Immunologic: No nasal congestion or hives. Physical Exam:   /88   Pulse 77   Ht 5' 9\" (1.753 m)   Wt 199 lb 9.6 oz (90.5 kg)   SpO2 98%   BMI 29.48 kg/m²   Wt Readings from Last 3 Encounters:   05/23/22 199 lb 9.6 oz (90.5 kg)   05/02/22 202 lb (91.6 kg)   12/11/21 189 lb 6 oz (85.9 kg)     Constitutional: He is oriented to person, place, and time. He appears well-developed and well-nourished. In no acute distress. Head: Normocephalic and atraumatic. Pupils equal and round. Neck: Neck supple. No JVP or carotid bruit appreciated. No mass and no thyromegaly present. No lymphadenopathy present. Cardiovascular: Normal rate. Normal heart sounds. Exam reveals no gallop and no friction rub. No murmur heard.   Pulmonary/Chest: Effort normal and breath sounds normal. No respiratory distress. He has no wheezes, rhonchi or rales. Abdominal: Soft, non-tender. Bowel sounds are normal. He exhibits no organomegaly, mass or bruit. Extremities: No edema. No cyanosis or clubbing. Pulses are 2+ radial/carotid bilaterally. Neurological: No gross cranial nerve deficit. Coordination normal.   Skin: Skin is warm and dry. There is no rash or diaphoresis. Psychiatric: He has a normal mood and affect. His speech is normal and behavior is normal.     Lab Review:   FLP:  No results found for: TRIG, HDL, LDLCALC, LDLDIRECT, LABVLDL  BUN/Creatinine:    Lab Results   Component Value Date    BUN 14 12/11/2021    CREATININE 0.6 12/11/2021     EKG Interpretation: 12/11/21 NSR. Left axis. Image Review:     PFT 7/17/14  1.  Suboptimal pulmonary function study. 2.  The best recorded spirometric values, nevertheless, are within normal limits and suffice to exclude a significant ventilatory defect. 3.  No response to inhaled bronchodilator demonstrated. 4.  Normal carbon monoxide diffusion. Assessment/Plan:   1) Atypical chest pain. Likely noncardiac based on description and improvement with starting a PPI. Denies any exertional chest pains. No plans for stress testing at this time. Advised to call if he has exertional symptoms. Encouraged GI follow up with recurrent symptoms after eating despite PPI. Will check updated lipid panel but with multiple risk factors will likely start statin therapy. 2) Essential hypertension. Controlled. Goal BP <130/80. Continue medical therapy. 3) Nicotine Addiction. Encouraged smoking cessation but patient is in the contemplative stage. 4) HLD. No lipid panel on file. However, his 10-year CVD risk will likely be greater than 7.5% based on his other risk factors. Once lipid panel returns, will perform calculation and likely start statin therapy to which he said he would be agreeable.     Follow up as needed or with worsening symptoms. Thank you very much for allowing me to participate in the care of your patient. Please do not hesitate to contact me if you have any questions. Sincerely,  Margarita Richmond. Kaylin Mckeon, [address] [place] [address], [address] [place] [address] [place] [address]  Ph: [phone]  Fax: [phone]    This note was scribed in the presence of Dr Kaylin Mckeon MD by Chetan Scott RN. Physician Attestation: The scribes documentation has been prepared under my direction and personally reviewed by me in its entirety. I confirm that the note above accurately reflects all work, treatment, procedures, and medical decision making performed by me. All portions of the note including but not limited to the chief complaint, history of present illness, physical exam, assessment and plan/medical decision making were personally reviewed, edited, and updated on the day of the visit.

## 2022-05-24 LAB
CREATININE URINE: 183.9 MG/DL (ref 39–259)
ESTIMATED AVERAGE GLUCOSE: 119.8 MG/DL
HBA1C MFR BLD: 5.8 %
HIV AG/AB: NORMAL
HIV ANTIGEN: NORMAL
HIV-1 ANTIBODY: NORMAL
HIV-2 AB: NORMAL
MICROALBUMIN UR-MCNC: <1.2 MG/DL
MICROALBUMIN/CREAT UR-RTO: NORMAL MG/G (ref 0–30)
VITAMIN D 25-HYDROXY: 21.5 NG/ML

## 2022-05-25 LAB
HEPATITIS C VIRUS AB BY CIA INDEX: 0.19 IV
HEPATITIS C VIRUS AB BY CIA INTERPRETATION: NEGATIVE

## 2022-05-27 LAB
PROSTATE SPECIFIC ANTIGEN FREE: 0.3 UG/L
PROSTATE SPECIFIC ANTIGEN PERCENT FREE: 27.3 %
PROSTATE SPECIFIC ANTIGEN: 1.1 UG/L (ref 0–4)

## 2022-06-13 NOTE — PROGRESS NOTES
[facility]      Cardiology Follow Up    Olu Rodriguez  1970 June 14, 2022    Referring Physician: Gisel Holloway MD  Reason for Referral: Chest pain     CC: \"Chest pains are back. \"     HPI:  The patient is 47 y.o. male with a past medical history significant for hypertension, prior alcohol abuse, and nicotine addiction who presents today for follow up regarding nonexertional chest pain. He reported chronic chest pains for years that typically occur after eating. At times, the pains can last for days. He states he was started on Protonix and initially noted improvement. He denies any exertional chest pains or worsening shortness of breath. He states he continues to remain active doing yard work without any exertional symptoms. Today, he states he has been having more random chest pains despite PPI use. This sensation he describes is similar to our initial encounter. He is concerned since a family member passed away from a heart attack over the weekend. He denies any abnormal bleeding or bruising. Patient denies worsening NÚÑEZ, PND, orthopnea, palpitations, lightheadedness, weight changes, LE edema, and syncope.     Past Medical History:   Diagnosis Date    Alcohol abuse     Depression     Diverticulitis     Hyperlipidemia     Hypertension      Past Surgical History:   Procedure Laterality Date    COLECTOMY      HAND SURGERY Right 9/2/14    Middle finger revision amputation     Family History   Problem Relation Age of Onset    Diabetes Mother     Coronary Art Dis Father      Social History     Tobacco Use    Smoking status: Current Every Day Smoker     Packs/day: 0.50     Types: Cigarettes    Smokeless tobacco: Never Used   Vaping Use    Vaping Use: Never used   Substance Use Topics    Alcohol use: Not Currently     Comment: 60-70 ounces of beer per day in the past    Drug use: No       No Known Allergies  Current Outpatient Medications   Medication Sig Dispense Refill    cloNIDine (CATAPRES) 0.1 MG tablet Take 0.1 mg by mouth 2 times daily      pantoprazole (PROTONIX) 20 MG tablet Take 1 tablet by mouth daily 30 tablet 3    amLODIPine (NORVASC) 10 MG tablet Take 1 tablet by mouth daily 90 tablet 1     No current facility-administered medications for this visit. Review of Systems:  · Constitutional: No unanticipated weight loss. There's been no change in energy level, sleep pattern, or activity level. No fevers, chills. · Eyes: No visual changes or diplopia. No scleral icterus. · ENT: No Headaches, hearing loss or vertigo. No mouth sores or sore throat. · Cardiovascular: as reviewed in HPI  · Respiratory: No cough or wheezing, no sputum production. No hemoptysis. · Gastrointestinal: No abdominal pain, appetite loss, blood in stools. No change in bowel or bladder habits. · Genitourinary: No dysuria, trouble voiding, or hematuria. · Musculoskeletal:  No gait disturbance, no joint complaints. · Integumentary: No rash or pruritis. · Neurological: No headache, diplopia, change in muscle strength, numbness or tingling. · Psychiatric: No anxiety or depression. · Endocrine: No temperature intolerance. No excessive thirst, fluid intake, or urination. No tremor. · Hematologic/Lymphatic: No abnormal bruising or bleeding, blood clots or swollen lymph nodes. · Allergic/Immunologic: No nasal congestion or hives. Physical Exam:   BP (!) 130/90   Pulse 99   Ht 5' 9\" (1.753 m)   Wt 203 lb 12.8 oz (92.4 kg)   SpO2 97%   BMI 30.10 kg/m²   Wt Readings from Last 3 Encounters:   06/14/22 203 lb 12.8 oz (92.4 kg)   05/23/22 199 lb 9.6 oz (90.5 kg)   05/02/22 202 lb (91.6 kg)     Constitutional: He is oriented to person, place, and time. He appears well-developed and well-nourished. In no acute distress. Head: Normocephalic and atraumatic. Pupils equal and round. Neck: Neck supple. No JVP or carotid bruit appreciated. No mass and no thyromegaly present.  No lymphadenopathy present. Cardiovascular: Normal rate. Normal heart sounds. Exam reveals no gallop and no friction rub. No murmur heard. Pulmonary/Chest: Effort normal and breath sounds normal. No respiratory distress. He has no wheezes, rhonchi or rales. Abdominal: Soft, non-tender. Bowel sounds are normal. He exhibits no organomegaly, mass or bruit. Extremities: No edema. No cyanosis or clubbing. Pulses are 2+ radial/carotid bilaterally. Neurological: No gross cranial nerve deficit. Coordination normal.   Skin: Skin is warm and dry. There is no rash or diaphoresis. Psychiatric: He has a normal mood and affect. His speech is normal and behavior is normal.     Lab Review:   FLP:    Lab Results   Component Value Date    TRIG 89 05/23/2022    HDL 54 05/23/2022    LDLCALC 117 05/23/2022    LABVLDL 18 05/23/2022     BUN/Creatinine:    Lab Results   Component Value Date    BUN 13 05/23/2022    CREATININE 0.7 05/23/2022     The 10-year ASCVD risk score (Miguelito Rios et al., 2013) is: 15.2%    Values used to calculate the score:      Age: 47 years      Sex: Male      Is Non- : Yes      Diabetic: No      Tobacco smoker: Yes      Systolic Blood Pressure: 051 mmHg      Is BP treated: Yes      HDL Cholesterol: 54 mg/dL      Total Cholesterol: 189 mg/dL    EKG Interpretation: 12/11/21 NSR. Left axis. Image Review:     PFT 7/17/14  1.  Suboptimal pulmonary function study. 2.  The best recorded spirometric values, nevertheless, are within normal limits and suffice to exclude a significant ventilatory defect. 3.  No response to inhaled bronchodilator demonstrated. 4.  Normal carbon monoxide diffusion. Assessment/Plan:   1) Atypical chest pain. Although the description is quite atypical, will risk stratify with a plain GXT stress test secondary to his risk factors and his the pain returned despite PPI use. 2) Essential hypertension. Controlled. Goal BP <130/80. Continue medical therapy.     3) Nicotine Addiction. Encouraged smoking cessation but patient is in the contemplative stage. 4) HLD. His 10-year CVD risk is greater than 7.5%. Begin Lipitor 10 mg daily and titrate as tolerated. Follow up as needed if stress test normal or with worsening symptoms. Thank you very much for allowing me to participate in the care of your patient. Please do not hesitate to contact me if you have any questions. Sincerely,  Bonnie Deleon. Hortencia Kelsey, [address] [place] [address]  Ph: [phone]  Fax: [phone]    This note was scribed in the presence of Dr. Connie Schuler MD by William Terry RN  Physician Attestation: The scribes documentation has been prepared under my direction and personally reviewed by me in its entirety. I confirm that the note above accurately reflects all work, treatment, procedures, and medical decision making performed by me. All portions of the note including but not limited to the chief complaint, history of present illness, physical exam, assessment and plan/medical decision making were personally reviewed, edited, and updated on the day of the visit.

## 2022-06-14 ENCOUNTER — OFFICE VISIT (OUTPATIENT)
Dept: CARDIOLOGY CLINIC | Age: 52
End: 2022-06-14
Payer: COMMERCIAL

## 2022-06-14 VITALS
OXYGEN SATURATION: 97 % | HEART RATE: 99 BPM | SYSTOLIC BLOOD PRESSURE: 130 MMHG | WEIGHT: 203.8 LBS | BODY MASS INDEX: 30.18 KG/M2 | DIASTOLIC BLOOD PRESSURE: 90 MMHG | HEIGHT: 69 IN

## 2022-06-14 DIAGNOSIS — E78.2 MIXED HYPERLIPIDEMIA: ICD-10-CM

## 2022-06-14 DIAGNOSIS — R07.89 ATYPICAL CHEST PAIN: Primary | ICD-10-CM

## 2022-06-14 DIAGNOSIS — F17.210 CIGARETTE NICOTINE DEPENDENCE WITHOUT COMPLICATION: ICD-10-CM

## 2022-06-14 DIAGNOSIS — I10 ESSENTIAL HYPERTENSION: ICD-10-CM

## 2022-06-14 PROCEDURE — 99214 OFFICE O/P EST MOD 30 MIN: CPT | Performed by: INTERNAL MEDICINE

## 2022-06-14 RX ORDER — ATORVASTATIN CALCIUM 10 MG/1
10 TABLET, FILM COATED ORAL DAILY
Qty: 90 TABLET | Refills: 5 | Status: SHIPPED | OUTPATIENT
Start: 2022-06-14

## 2022-06-14 NOTE — PATIENT INSTRUCTIONS
Patient Education        Chest Pain: Care Instructions  Your Care Instructions     There are many things that can cause chest pain. Some are not serious and will get better on their own in a few days. But some kinds of chest pain need more testing and treatment. Your doctor may have recommended a follow-up visit in the next 8 to 12 hours. If you are not getting better, you may need more testsor treatment. Even though your doctor has released you, you still need to watch for any problems. The doctor carefully checked you, but sometimes problems can develop later. If you have new symptoms or if your symptoms do not get better, getmedical care right away. If you have worse or different chest pain or pressure that lasts more than 5 minutes or you passed out (lost consciousness), call 911 or seek other emergency help right away. A medical visit is only one step in your treatment. Even if you feel better, you still need to do what your doctor recommends, such as going to all suggested follow-up appointments and taking medicines exactly as directed. Thiswill help you recover and help prevent future problems. How can you care for yourself at home?  Rest until you feel better.  Take your medicine exactly as prescribed. Call your doctor if you think you are having a problem with your medicine.  Do not drive after taking a prescription pain medicine. When should you call for help? Call 911 if:     You passed out (lost consciousness).      You have severe difficulty breathing.      You have symptoms of a heart attack. These may include:  ? Chest pain or pressure, or a strange feeling in your chest.  ? Sweating. ? Shortness of breath. ? Nausea or vomiting. ? Pain, pressure, or a strange feeling in your back, neck, jaw, or upper belly or in one or both shoulders or arms. ? Lightheadedness or sudden weakness. ? A fast or irregular heartbeat.   After you call 911, the  may tell you to chew 1 adult-strength or 2 to 4 low-dose aspirin. Wait for an ambulance. Do not try to drive yourself. Call your doctor today if:      You have any trouble breathing.      Your chest pain gets worse.      You are dizzy or lightheaded, or you feel like you may faint.      You are not getting better as expected.      You are having new or different chest pain. Where can you learn more? Go to https://[employer] Networkspe[employer]va Labs.Knowable. org and sign in to your GoWorkaBit account. Enter A120 in the [employer] box to learn more about \"Chest Pain: Care Instructions. \"     If you do not have an account, please click on the \"Sign Up Now\" link. Current as of: July 1, 2021               Content Version: 13.2  © 3202-9269 [employer]. Care instructions adapted under license by [facility] ([facility]). If you have questions about a medical condition or this instruction, always ask your healthcare professional. [place] [address] any warranty or liability for your use of this information. Patient Education        Chest Pain: Care Instructions  Your Care Instructions     There are many things that can cause chest pain. Some are not serious and will get better on their own in a few days. But some kinds of chest pain need more testing and treatment. Your doctor may have recommended a follow-up visit in the next 8 to 12 hours. If you are not getting better, you may need more testsor treatment. Even though your doctor has released you, you still need to watch for any problems. The doctor carefully checked you, but sometimes problems can develop later. If you have new symptoms or if your symptoms do not get better, getmedical care right away. If you have worse or different chest pain or pressure that lasts more than 5 minutes or you passed out (lost consciousness), call 911 or seek other emergency help right away. A medical visit is only one step in your treatment.  Even if you feel better, you still need to do what your doctor recommends, such as going to all suggested follow-up appointments and taking medicines exactly as directed. Thiswill help you recover and help prevent future problems. How can you care for yourself at home?  Rest until you feel better.  Take your medicine exactly as prescribed. Call your doctor if you think you are having a problem with your medicine.  Do not drive after taking a prescription pain medicine. When should you call for help? Call 911 if:     You passed out (lost consciousness).      You have severe difficulty breathing.      You have symptoms of a heart attack. These may include:  ? Chest pain or pressure, or a strange feeling in your chest.  ? Sweating. ? Shortness of breath. ? Nausea or vomiting. ? Pain, pressure, or a strange feeling in your back, neck, jaw, or upper belly or in one or both shoulders or arms. ? Lightheadedness or sudden weakness. ? A fast or irregular heartbeat. After you call 911, the  may tell you to chew 1 adult-strength or 2 to 4 low-dose aspirin. Wait for an ambulance. Do not try to drive yourself. Call your doctor today if:      You have any trouble breathing.      Your chest pain gets worse.      You are dizzy or lightheaded, or you feel like you may faint.      You are not getting better as expected.      You are having new or different chest pain. Where can you learn more? Go to https://DoublePlay Entertainmentcleo.Uni-Control. org and sign in to your Acsendo account. Enter A120 in the [facility] box to learn more about \"Chest Pain: Care Instructions. \"     If you do not have an account, please click on the \"Sign Up Now\" link. Current as of: July 1, 2021               Content Version: 13.2  © 6615-1696 [employer], [employer]. Care instructions adapted under license by [facility] ([facility]).  If you have questions about a medical condition or this instruction, always ask your healthcare professional. [employer] disclaims any warranty or liability for your use of this information.

## 2022-06-27 ENCOUNTER — HOSPITAL ENCOUNTER (OUTPATIENT)
Dept: NON INVASIVE DIAGNOSTICS | Age: 52
Discharge: HOME | End: 2022-06-27
Payer: COMMERCIAL

## 2022-06-27 DIAGNOSIS — R07.89 ATYPICAL CHEST PAIN: ICD-10-CM

## 2022-06-27 PROCEDURE — 93017 CV STRESS TEST TRACING ONLY: CPT

## 2022-07-14 PROBLEM — F17.200 TOBACCO USE DISORDER: Status: ACTIVE | Noted: 2022-07-14

## 2022-07-15 ENCOUNTER — OFFICE VISIT (OUTPATIENT)
Dept: PRIMARY CARE CLINIC | Age: 52
End: 2022-07-15
Payer: COMMERCIAL

## 2022-07-15 VITALS
OXYGEN SATURATION: 97 % | BODY MASS INDEX: 30.57 KG/M2 | TEMPERATURE: 98.3 F | DIASTOLIC BLOOD PRESSURE: 94 MMHG | WEIGHT: 207 LBS | HEART RATE: 78 BPM | SYSTOLIC BLOOD PRESSURE: 133 MMHG

## 2022-07-15 DIAGNOSIS — F17.200 TOBACCO USE DISORDER: Primary | ICD-10-CM

## 2022-07-15 DIAGNOSIS — I10 ESSENTIAL HYPERTENSION: ICD-10-CM

## 2022-07-15 DIAGNOSIS — E78.00 PURE HYPERCHOLESTEROLEMIA: ICD-10-CM

## 2022-07-15 PROCEDURE — 99214 OFFICE O/P EST MOD 30 MIN: CPT | Performed by: INTERNAL MEDICINE

## 2022-07-15 RX ORDER — LISINOPRIL 20 MG/1
20 TABLET ORAL DAILY
Qty: 90 TABLET | Refills: 1 | Status: SHIPPED | OUTPATIENT
Start: 2022-07-15 | End: 2022-07-15 | Stop reason: SDUPTHER

## 2022-07-15 RX ORDER — NICOTINE 21 MG/24HR
1 PATCH, TRANSDERMAL 24 HOURS TRANSDERMAL DAILY
Qty: 42 PATCH | Refills: 0 | Status: SHIPPED | OUTPATIENT
Start: 2022-07-15 | End: 2022-08-26

## 2022-07-15 RX ORDER — LISINOPRIL 20 MG/1
20 TABLET ORAL
Qty: 90 TABLET | Refills: 1 | Status: SHIPPED | OUTPATIENT
Start: 2022-07-15 | End: 2022-07-20

## 2022-07-15 RX ORDER — AMLODIPINE BESYLATE 10 MG/1
10 TABLET ORAL DAILY
Qty: 90 TABLET | Refills: 1 | Status: SHIPPED | OUTPATIENT
Start: 2022-07-15

## 2022-07-15 ASSESSMENT — PATIENT HEALTH QUESTIONNAIRE - PHQ9
SUM OF ALL RESPONSES TO PHQ QUESTIONS 1-9: 0
SUM OF ALL RESPONSES TO PHQ9 QUESTIONS 1 & 2: 0
SUM OF ALL RESPONSES TO PHQ QUESTIONS 1-9: 0
SUM OF ALL RESPONSES TO PHQ QUESTIONS 1-9: 0
2. FEELING DOWN, DEPRESSED OR HOPELESS: 0
SUM OF ALL RESPONSES TO PHQ QUESTIONS 1-9: 0
1. LITTLE INTEREST OR PLEASURE IN DOING THINGS: 0

## 2022-07-15 NOTE — PATIENT INSTRUCTIONS
1. Fasting blood test 9/2022  2. Switch amlodipine and atorvastatin to bedtime  3. Before going to work, start taking lisinopril 20mg daily  4. Get a bp cuff check 3x/week and bring report to f/u visit  5. Let me know how lisinopril is doing (agreeing with you) in 2 weeks, and what your Bps are running  6. At that time also how many cheat cigarettes--may add chantix or zyban then  7.  Will consider PFTs (lung breathing test) in future, may benefit from long acting inhaler  8/ if still coughing green next week, azithromycin antibiotic

## 2022-07-15 NOTE — PROGRESS NOTES
7/15/2022    Beverly Ace (:  1970) is a 47 y.o. male, here for evaluation of the following medical concerns:    No chief complaint on file. HPI  26-year-old male history of hypertension on clonidine amlodipine, 10-pack-year nicotine dependence,alcoholism previously on naltrexone, history of homelessness sleeping on mother's couch intermittently,  previously seen at [place] [id number], who established care May 2022 raising concerns:  #1  Chronic pelvic pain. Patient reports sigmoid colectomy for complicated diverticulitis in . Now experiences pain when he has to hold his urine, and when he stools he will see a gel discharge from his bladder. Saw Dr. Alessandra Green at [facility] 2021:  \"he feels like he has got something eating the inside of his urinary tract. I do not know what that means and he has no objective abnormality on PSA, urinalysis or PVR. He does complain of erectile dysfunction. 5 mg of Cialis can help ED and can help voiding I will prescribe it and see what happens. \"  CT 19:FINDINGS:     \"Evaluation of the lower chest demonstrates minimal dependent atelectasis. The lung bases are otherwise clear. There is diffuse hepatic steatosis. There is no evidence of biliary ductal dilation. The gallbladder is normal in CT appearance. The spleen, pancreas and adrenal glands are normal.   The kidneys demonstrate symmetric enhancement with intravenous contrast. There is no evidence of collecting system dilation. There is no evidence of retroperitoneal or pelvic adenopathy. Evaluation of the GI tract demonstrates an anastomotic suture line at the rectosigmoid junction. This most likely reflects a prior sigmoidectomy. There are scattered colonic diverticula. There is no evidence of diverticulitis. A normal appendix is visualized. There are prominent vessels within the cecum (best seen on coronal image 69 of series 602), raising the possibility of angiodysplasia in this region. There is no evidence of diverticulitis. There is minimal free fluid seen within the pelvis. The bladder, prostate and seminal vesicles are normal in contour. No suspicious osseous lesions are evident. \"    We referred him to a different urologist for consideration of cystoscopy to exclude colovesicular fistula. #2.  Atypical chest pain. 3 in early 2022, patient reports achy chest pain lasting for days, occurring nearly weekly. Endorses some solid food dysphagia but no melena. Denies relation to food position exertion deep breathing. Seen in the ED December 12, 2021 for sharp substernal pain, unrelieved by nitroglycerin and aspirin. EKG serial troponin BNP D-dimer CBC CMP chest x-ray unremarkable. Patient has hypertension and nicotine dependence half pack per day. Empirically started Protonix and referred to cardiology for consideration of a stress test.    #3  Routine adult health maintenance. We updated routine screening labs, which he needed to give to his employer. Notable for vitamin D deficiency, and . #4 essential hypertension. Used to take clonidine and amlodipine, for some reason stop the clonidine and blood pressure was 130/90 on intake, consistent with prior blood pressure readings. Was not checking blood pressure at home. We obtained endorgan damage screening labs and asked him to check his blood pressures and follow-up in a month. He comes in now a couple months later reporting he has not checked his blood pressure outpatient did not get a cuff. Review of Systems   Constitutional: Negative for activity change, appetite change, fatigue and unexpected weight change. HENT: Negative for dental problem, sinus pain, sore throat and trouble swallowing. Eyes: Negative for pain and visual disturbance. Respiratory: Negative for apnea, cough, chest tightness, shortness of breath and wheezing. Cardiovascular: Negative for chest pain and palpitations.    Gastrointestinal: Smokeless tobacco: Never   Vaping Use    Vaping Use: Never used   Substance and Sexual Activity    Alcohol use: Not Currently     Comment: 60-70 ounces of beer per day in the past    Drug use: No    Sexual activity: Yes   Other Topics Concern    Not on file   Social History Narrative    Not on file     Social Determinants of Health     Financial Resource Strain: Low Risk     Difficulty of Paying Living Expenses: Not hard at all   Food Insecurity: No Food Insecurity    Worried About Running Out of Food in the Last Year: Never true    Ran Out of Food in the Last Year: Never true   Transportation Needs: Not on file   Physical Activity: Not on file   Stress: Not on file   Social Connections: Not on file   Intimate Partner Violence: Not on file   Housing Stability: Not on file        Family History   Problem Relation Age of Onset    Diabetes Mother     Coronary Art Dis Father        There were no vitals filed for this visit. Estimated body mass index is 30.1 kg/m² as calculated from the following:    Height as of 6/14/22: 5' 9\" (1.753 m). Weight as of 6/14/22: 203 lb 12.8 oz (92.4 kg). PHYSICAL EXAM  GENERAL:  Pleasant tired appearing  americanmale who looks his stated age, awake alert and oriented x3, no acute distress. HEENT:  Normocephalic atraumatic. Pupils equal round reactive light and accommodation, extra ocular muscles are intact. Oropharynx is clear moist without injection or exudate. Tongue and palate move normally. Turbinates appear normal.  Tympanic membranes appear normal.  NECK:  Supple nontender. No carotid bruits. Brisk carotid upstrokes, no JVD. No thyromegaly. LYMPH:  No supraclavicular cervical axillary or inguinal lymphadenopathy. LUNGS:  Clear to auscultation bilaterally. Very good air entry. No inspiratory crackles or expiratory wheezes. HEART:  Regular rate and rhythm without pathologic murmur rub gallop S3 or S4. ABDOMEN:  Soft, nontender. Normal bowel sounds.  No guarding. No masses. UROGENITAL:  Normal testicular exam.  EXTREMITIES:  Warm and well perfused without clubbing cyanosis or edema. 2+ pulses in all 4 extremities. Capillary refill less than 2 seconds. NEURO:  Cranial nerves 2-12 grossly intact. Normal muscle bulk and tone. No resting tremor, cogwheeling, normal rapid alternating movements in the hands and feet. No stocking paresthesia. Normal gait and station. MUSCULOSKELETAL:  Mild osteoarthritic changes. SKIN:  No worrisome lesions, skin a little dry. PSYCH:  No psychomotor retardation or agitation. Good eye contact. Unrestricted affect range. Mood congruent with affect. Linear thought.     LABS  Lab Review   Orders Only on 05/23/2022   Component Date Value    CRP 05/23/2022 <3.0     Hepatitis C Virus Ab by * 05/23/2022 0.19     Hepatitis C Virus Ab by * 05/23/2022 Negative     HIV Ag/Ab 05/23/2022 Non-Reactive     HIV-1 Antibody 05/23/2022 Non-Reactive     HIV ANTIGEN 05/23/2022 Non-Reactive     HIV-2 Ab 05/23/2022 Non-Reactive     Vit D, 25-Hydroxy 05/23/2022 21.5 (A)    PSA 05/23/2022 1.1     PSA, Free 05/23/2022 0.3     PSA, Free Pct 05/23/2022 27.3     TSH 05/23/2022 3.80     Sodium 05/23/2022 140     Potassium 05/23/2022 4.2     Chloride 05/23/2022 105     CO2 05/23/2022 21     Anion Gap 05/23/2022 14     Glucose 05/23/2022 100 (A)    BUN 05/23/2022 13     CREATININE 05/23/2022 0.7 (A)    GFR Non- 05/23/2022 >60     GFR  05/23/2022 >60     Calcium 05/23/2022 9.3     Hemoglobin A1C 05/23/2022 5.8     eAG 05/23/2022 119.8     ALT 05/23/2022 14     AST 05/23/2022 19     Cholesterol, Total 05/23/2022 189     Triglycerides 05/23/2022 89     HDL 05/23/2022 54     LDL Calculated 05/23/2022 117 (A)    VLDL Cholesterol Calcula* 05/23/2022 18     Microalbumin, Random Uri* 05/23/2022 <1.20     Creatinine, Ur 05/23/2022 183.9     Microalbumin Creatinine * 05/23/2022 see below    Office Visit on 05/02/2022   Component Date Value    Urine Culture, Routine 05/02/2022 No growth at 18 to 36 hours     Color, UA 05/02/2022 Yellow     Clarity, UA 05/02/2022 Clear     Glucose, Ur 05/02/2022 Negative     Bilirubin Urine 05/02/2022 Negative     Ketones, Urine 05/02/2022 Negative     Specific Gravity, UA 05/02/2022 1.020     Blood, Urine 05/02/2022 Negative     pH, UA 05/02/2022 6.0     Protein, UA 05/02/2022 Negative     Urobilinogen, Urine 05/02/2022 0.2     Nitrite, Urine 05/02/2022 Negative     Leukocyte Esterase, Urine 05/02/2022 Negative     Microscopic Examination 05/02/2022 Not Indicated     Urine Type 05/02/2022 Cleancatch     Bacteria, UA 05/02/2022 None Seen     Hyaline Casts, UA 05/02/2022 0     WBC, UA 05/02/2022 1     RBC, UA 05/02/2022 1     Epithelial Cells, UA 05/02/2022 0    Admission on 12/11/2021, Discharged on 12/12/2021   Component Date Value    Ventricular Rate 12/11/2021 72     Atrial Rate 12/11/2021 72     P-R Interval 12/11/2021 184     QRS Duration 12/11/2021 110     Q-T Interval 12/11/2021 394     QTc Calculation (Bazett) 12/11/2021 431     P Axis 12/11/2021 35     R Axis 12/11/2021 -29     T Axis 12/11/2021 4     Diagnosis 12/11/2021 Normal sinus rhythmLeft axis deviationWhen compared with ECG of 08-JUN-2020 17:12,No significant change was foundConfirmed by Danielle Harris MD, Vinicius Aus ([id number]) on 12/12/2021 3:33:02 PM     WBC 12/11/2021 4.8     RBC 12/11/2021 4.38     Hemoglobin 12/11/2021 13.8     Hematocrit 12/11/2021 40.2 (A)    MCV 12/11/2021 91.8     MCH 12/11/2021 31.4     MCHC 12/11/2021 34.2     RDW 12/11/2021 13.5     Platelets 34/45/6827 173     MPV 12/11/2021 7.7     Neutrophils % 12/11/2021 47.9     Lymphocytes % 12/11/2021 36.0     Monocytes % 12/11/2021 8.6     Eosinophils % 12/11/2021 6.5     Basophils % 12/11/2021 1.0     Neutrophils Absolute 12/11/2021 2.3     Lymphocytes Absolute 12/11/2021 1.7     Monocytes Absolute 12/11/2021 0.4     Eosinophils Absolute 12/11/2021 0.3     Basophils Absolute 12/11/2021 0.0 Sodium 12/11/2021 140     Potassium reflex Magnesi* 12/11/2021 3.7     Chloride 12/11/2021 105     CO2 12/11/2021 28     Anion Gap 12/11/2021 7     Glucose 12/11/2021 96     BUN 12/11/2021 14     CREATININE 12/11/2021 0.6 (A)    GFR Non- 12/11/2021 >60     GFR  12/11/2021 >60     Calcium 12/11/2021 9.0     Total Protein 12/11/2021 6.9     Albumin 12/11/2021 3.9     Albumin/Globulin Ratio 12/11/2021 1.3     Total Bilirubin 12/11/2021 0.7     Alkaline Phosphatase 12/11/2021 65     ALT 12/11/2021 19     AST 12/11/2021 26     Troponin 12/11/2021 <0.01     Pro-BNP 12/11/2021 45     D-Dimer, Quant 12/11/2021 <200     Troponin 12/12/2021 <0.01          ASSESSMENT/PLAN  1. Atypical chest pain  Reassuring evaluation December 2021 in the ED. [facility] Protonix 20 mg daily without benefit after 1 month trial.  Normal CRP. Negative GXT 2022 excellent effort. Observation only. 2. Cigarette nicotine dependence without complication  Discussed cessation strategies at follow-up. Half pack per day, 12.5 cumulative pack-year burden not a candidate for screening lung CT however wishes to set a quit date. Non-smoking home. Prescription for 14 mg nicotine patch and Nicotrol inhaler provided, he will let me know in a couple weeks how many cigarettes he is smoking and we may consider Chantix versus Zyban. 3. Primary hypertension  More diastolic hypertension. Suboptimal control in the office really running 130/90 despite addition of 10 mg of amlodipine. Did not get outpatient blood pressure cuff will do so now. We will add lisinopril 20 mg daily expect to get up to 40 mg plus chlorthalidone 25 mg..  We will obtain out patient readings for 2 weeks send record, as well as bring g record to follow-up appointment. No microalbuminuria normal creatinine. 4. Routine adult health maintenance  Explore colonoscopy on return. Tdap 2014, Pneumovax 23 2015, eligible for [employer] booster #1.   A1c TSH PSA reassuring 2022. HIV hepatitis C nonreactive 2022. Encouraged to take 1000 to 2000 IU vitamin D daily for vitamin D deficiency. 5. Scrotal pain  Normal UA 2021 and 2022. Wonder about cystoscopy colovesicular fistula. Had no worrisome findings on CT in 2019, we referred to urology who performed cystoscopy 2022, results not available to me. Patient will call urology regarding next steps, findings. 6. History of colonic diverticulitis, apparently complicated  Evidence of sigmoid colectomy on CT 2019. Due for colonoscopy 2022 apparently, will get blood pressure under control first.    7.  Pure hypercholesterolemia. Cardiology kindly started low-dose atorvastatin for 17% 10-year risk heart attack stroke given hypertension age lipid profile and nicotine dependence. Ordered surveillance labs in mid September 2022. No follow-ups on file. It was a pleasure to visit with Mr. Calderon Kimnathanael today. Answered all questions as best I could.   Braden Wolf MD   Time 35 minutes

## 2022-07-20 ENCOUNTER — TELEPHONE (OUTPATIENT)
Dept: PRIMARY CARE CLINIC | Age: 52
End: 2022-07-20

## 2022-07-20 RX ORDER — METOPROLOL SUCCINATE 50 MG/1
50 TABLET, EXTENDED RELEASE ORAL DAILY
Qty: 30 TABLET | Refills: 5 | Status: SHIPPED | OUTPATIENT
Start: 2022-07-20 | End: 2022-09-16 | Stop reason: SDUPTHER

## 2022-07-20 NOTE — TELEPHONE ENCOUNTER
----- Message from Gabby Garcia sent at 7/20/2022  8:31 AM EDT -----  Subject: Message to Provider    QUESTIONS  Information for Provider? Patient stated the medication lisinopril Dr. Agustina Vaughan prescribed his family is allergic to and he wood like to know if   something else can be prescribed instead. Patient would like a call back   as soon as possible today.  ---------------------------------------------------------------------------  --------------  [id number] [facility]  [phone]; OK to leave message on voicemail  ---------------------------------------------------------------------------  --------------  SCRIPT ANSWERS  Relationship to Patient?  Self

## 2022-07-20 NOTE — TELEPHONE ENCOUNTER
----- Message from Zayda Darlin sent at 7/20/2022  8:31 AM EDT -----  Subject: Message to Provider    QUESTIONS  Information for Provider? Patient stated the medication lisinopril Dr. Joseph Apple prescribed his family is allergic to and he wood like to know if   something else can be prescribed instead. Patient would like a call back   as soon as possible today.  ---------------------------------------------------------------------------  --------------  [address] [facility]  [phone]; OK to leave message on voicemail  ---------------------------------------------------------------------------  --------------  SCRIPT ANSWERS  Relationship to Patient?  Self

## 2022-07-22 DIAGNOSIS — J40 BRONCHITIS: Primary | ICD-10-CM

## 2022-07-22 RX ORDER — AZITHROMYCIN 250 MG/1
250 TABLET, FILM COATED ORAL SEE ADMIN INSTRUCTIONS
Qty: 6 TABLET | Refills: 0 | Status: SHIPPED | OUTPATIENT
Start: 2022-07-22 | End: 2022-07-27

## 2022-09-12 DIAGNOSIS — I10 ESSENTIAL HYPERTENSION: ICD-10-CM

## 2022-09-12 DIAGNOSIS — E78.00 PURE HYPERCHOLESTEROLEMIA: ICD-10-CM

## 2022-09-12 LAB
ALT SERPL-CCNC: 43 U/L (ref 10–40)
ANION GAP SERPL CALCULATED.3IONS-SCNC: 11 MMOL/L (ref 3–16)
AST SERPL-CCNC: 146 U/L (ref 15–37)
BUN BLDV-MCNC: 14 MG/DL (ref 7–20)
CALCIUM SERPL-MCNC: 9.1 MG/DL (ref 8.3–10.6)
CHLORIDE BLD-SCNC: 104 MMOL/L (ref 99–110)
CHOLESTEROL, TOTAL: 166 MG/DL (ref 0–199)
CO2: 25 MMOL/L (ref 21–32)
CREAT SERPL-MCNC: 0.8 MG/DL (ref 0.9–1.3)
GFR AFRICAN AMERICAN: >60
GFR NON-AFRICAN AMERICAN: >60
GLUCOSE BLD-MCNC: 101 MG/DL (ref 70–99)
HDLC SERPL-MCNC: 54 MG/DL (ref 40–60)
LDL CHOLESTEROL CALCULATED: 100 MG/DL
POTASSIUM SERPL-SCNC: 4.2 MMOL/L (ref 3.5–5.1)
SODIUM BLD-SCNC: 140 MMOL/L (ref 136–145)
TRIGL SERPL-MCNC: 60 MG/DL (ref 0–150)
VLDLC SERPL CALC-MCNC: 12 MG/DL

## 2022-09-16 ENCOUNTER — OFFICE VISIT (OUTPATIENT)
Dept: PRIMARY CARE CLINIC | Age: 52
End: 2022-09-16
Payer: COMMERCIAL

## 2022-09-16 VITALS
SYSTOLIC BLOOD PRESSURE: 135 MMHG | OXYGEN SATURATION: 95 % | BODY MASS INDEX: 31.45 KG/M2 | TEMPERATURE: 98.1 F | WEIGHT: 213 LBS | HEART RATE: 81 BPM | DIASTOLIC BLOOD PRESSURE: 91 MMHG

## 2022-09-16 DIAGNOSIS — I10 PRIMARY HYPERTENSION: ICD-10-CM

## 2022-09-16 DIAGNOSIS — R79.89 ABNORMAL LIVER FUNCTION TEST: Primary | ICD-10-CM

## 2022-09-16 DIAGNOSIS — F17.200 TOBACCO USE DISORDER: ICD-10-CM

## 2022-09-16 PROCEDURE — 99213 OFFICE O/P EST LOW 20 MIN: CPT | Performed by: INTERNAL MEDICINE

## 2022-09-16 RX ORDER — METOPROLOL SUCCINATE 100 MG/1
100 TABLET, EXTENDED RELEASE ORAL DAILY
Qty: 90 TABLET | Refills: 1 | Status: SHIPPED | OUTPATIENT
Start: 2022-09-16

## 2022-09-16 RX ORDER — VARENICLINE TARTRATE 25 MG
KIT ORAL
Qty: 53 EACH | Refills: 0 | Status: SHIPPED | OUTPATIENT
Start: 2022-09-16 | End: 2022-10-17 | Stop reason: SINTOL

## 2022-09-16 ASSESSMENT — PATIENT HEALTH QUESTIONNAIRE - PHQ9
SUM OF ALL RESPONSES TO PHQ9 QUESTIONS 1 & 2: 0
SUM OF ALL RESPONSES TO PHQ QUESTIONS 1-9: 0
1. LITTLE INTEREST OR PLEASURE IN DOING THINGS: 0
SUM OF ALL RESPONSES TO PHQ QUESTIONS 1-9: 0
2. FEELING DOWN, DEPRESSED OR HOPELESS: 0
SUM OF ALL RESPONSES TO PHQ QUESTIONS 1-9: 0
SUM OF ALL RESPONSES TO PHQ QUESTIONS 1-9: 0

## 2022-09-16 NOTE — PROGRESS NOTES
2022    Jia Armando (:  1970) is a 47 y.o. male, here for evaluation of the following medical concerns:    No chief complaint on file. HPI  Most pleasant 50-year-old -American  male history of hypertension on clonidine amlodipine, 10-pack-year nicotine dependence,alcoholism previously on naltrexone abstinent since May 2021, history of homelessness sleeping on mother's couch intermittently,  previously seen at [place] [id number], who attends for report visit. He has not checked his blood pressure regularly, a couple months ago he believes his blood pressure on check was 130s over 90s. He states that he is compliant with metoprolol and amlodipine. He is smoking half a pack per day, but wants Chantix to quit. Review of Systems   Constitutional: Negative for activity change, appetite change, fatigue and unexpected weight change. HENT: Negative for dental problem, sinus pain, sore throat and trouble swallowing. Eyes: Negative for pain and visual disturbance. Respiratory: Negative for apnea, cough, chest tightness, shortness of breath and wheezing. Cardiovascular: Negative for chest pain and palpitations. Gastrointestinal: Negative for abdominal pain, blood in stool, constipation, diarrhea, nausea, rectal pain and vomiting. Endocrine: Negative for cold intolerance, heat intolerance, polydipsia, polyphagia and polyuria. Genitourinary: Negative for difficulty urinating, dysuria, flank pain, frequency, hematuria, pelvic pain, urgency,  Musculoskeletal: Negative for arthralgias, back pain, gait problem, joint swelling, myalgias, neck pain and neck stiffness. Skin: Negative for color change and rash. Neurological: Negative for dizziness, tremors, syncope, speech difficulty, weakness, light-headedness and headaches. Hematological: Negative for adenopathy. Does not bruise/bleed easily.    Psychiatric/Behavioral: Negative for agitation, behavioral problems, decreased concentration, sleep disturbance and suicidal ideas. The patient is not nervous/anxious and is not hyperactive. Prior to Visit Medications    Medication Sig Taking? Authorizing Provider   metoprolol succinate (TOPROL XL) 50 MG extended release tablet Take 1 tablet by mouth in the morning. Bee Ren MD   amLODIPine (NORVASC) 10 MG tablet Take 1 tablet by mouth in the morning. Bee Ren MD   nicotine (NICOTROL) 10 MG inhaler Inhale 1 puff into the lungs as needed for Smoking cessation (nicotine desire. Do not use more than 10x/day)  Bee Ren MD   nicotine (NICODERM CQ) 14 MG/24HR Place 1 patch onto the skin in the morning.   Bee Ren MD   atorvastatin (LIPITOR) 10 MG tablet Take 1 tablet by mouth daily  Oziel Harvey MD        No Known Allergies    Past Medical History:   Diagnosis Date    Alcohol abuse     Depression     Diverticulitis     Hyperlipidemia     Hypertension        Past Surgical History:   Procedure Laterality Date    COLECTOMY      HAND SURGERY Right 9/2/14    Middle finger revision amputation       Social History     Socioeconomic History    Marital status: Legally      Spouse name: Not on file    Number of children: 2    Years of education: Not on file    Highest education level: Not on file   Occupational History    Not on file   Tobacco Use    Smoking status: Every Day     Packs/day: 0.50     Types: Cigarettes    Smokeless tobacco: Never   Vaping Use    Vaping Use: Never used   Substance and Sexual Activity    Alcohol use: Not Currently     Comment: 60-70 ounces of beer per day in the past    Drug use: No    Sexual activity: Yes   Other Topics Concern    Not on file   Social History Narrative    Not on file     Social Determinants of Health     Financial Resource Strain: Low Risk     Difficulty of Paying Living Expenses: Not hard at all   Food Insecurity: No Food Insecurity    Worried About [address] [employer] in the Last Year: Never true    Ran Out of Food in the Last Year: Never true   Transportation Needs: Not on file   Physical Activity: Not on file   Stress: Not on file   Social Connections: Not on file   Intimate Partner Violence: Not on file   Housing Stability: Not on file        Family History   Problem Relation Age of Onset    Diabetes Mother     Coronary Art Dis Father        There were no vitals filed for this visit. Estimated body mass index is 30.57 kg/m² as calculated from the following:    Height as of 6/14/22: 5' 9\" (1.753 m). Weight as of 7/15/22: 207 lb (93.9 kg). PHYSICAL EXAM  GENERAL:  Pleasant  americanmale who looks his stated age, awake alert and oriented x3, no acute distress. ABDOMEN:  Soft, nontender. Normal bowel sounds. No guarding. No masses. PSYCH:  No psychomotor retardation or agitation. Good eye contact. Unrestricted affect range. Mood congruent with affect. Linear thought.     LABS  Lab Review   Orders Only on 09/12/2022   Component Date Value    Cholesterol, Total 09/12/2022 166     Triglycerides 09/12/2022 60     HDL 09/12/2022 54     LDL Calculated 09/12/2022 100 (A)    VLDL Cholesterol Calcula* 09/12/2022 12     AST 09/12/2022 146 (A)    ALT 09/12/2022 43 (A)    Sodium 09/12/2022 140     Potassium 09/12/2022 4.2     Chloride 09/12/2022 104     CO2 09/12/2022 25     Anion Gap 09/12/2022 11     Glucose 09/12/2022 101 (A)    BUN 09/12/2022 14     Creatinine 09/12/2022 0.8 (A)    GFR Non- 09/12/2022 >60     GFR  09/12/2022 >60     Calcium 09/12/2022 9.1    Orders Only on 05/23/2022   Component Date Value    CRP 05/23/2022 <3.0     Hepatitis C Virus Ab by * 05/23/2022 0.19     Hepatitis C Virus Ab by * 05/23/2022 Negative     HIV Ag/Ab 05/23/2022 Non-Reactive     HIV-1 Antibody 05/23/2022 Non-Reactive     HIV ANTIGEN 05/23/2022 Non-Reactive     HIV-2 Ab 05/23/2022 Non-Reactive     Vit D, 25-Hydroxy 05/23/2022 21.5 (A)    PSA 05/23/2022 1.1     PSA, Free 05/23/2022 0.3 PSA, Free Pct 05/23/2022 27.3     TSH 05/23/2022 3.80     Sodium 05/23/2022 140     Potassium 05/23/2022 4.2     Chloride 05/23/2022 105     CO2 05/23/2022 21     Anion Gap 05/23/2022 14     Glucose 05/23/2022 100 (A)    BUN 05/23/2022 13     Creatinine 05/23/2022 0.7 (A)    GFR Non- 05/23/2022 >60     GFR  05/23/2022 >60     Calcium 05/23/2022 9.3     Hemoglobin A1C 05/23/2022 5.8     eAG 05/23/2022 119.8     ALT 05/23/2022 14     AST 05/23/2022 19     Cholesterol, Total 05/23/2022 189     Triglycerides 05/23/2022 89     HDL 05/23/2022 54     LDL Calculated 05/23/2022 117 (A)    VLDL Cholesterol Calcula* 05/23/2022 18     Microalbumin, Random Uri* 05/23/2022 <1.20     Creatinine, Ur 05/23/2022 183.9     Microalbumin Creatinine * 05/23/2022 see below    Office Visit on 05/02/2022   Component Date Value    Urine Culture, Routine 05/02/2022 No growth at 18 to 36 hours     Color, UA 05/02/2022 Yellow     Clarity, UA 05/02/2022 Clear     Glucose, Ur 05/02/2022 Negative     Bilirubin Urine 05/02/2022 Negative     Ketones, Urine 05/02/2022 Negative     Specific Gravity, UA 05/02/2022 1.020     Blood, Urine 05/02/2022 Negative     pH, UA 05/02/2022 6.0     Protein, UA 05/02/2022 Negative     Urobilinogen, Urine 05/02/2022 0.2     Nitrite, Urine 05/02/2022 Negative     Leukocyte Esterase, Urine 05/02/2022 Negative     Microscopic Examination 05/02/2022 Not Indicated     Urine Type 05/02/2022 Cleancatch     Bacteria, UA 05/02/2022 None Seen     Hyaline Casts, UA 05/02/2022 0     WBC, UA 05/02/2022 1     RBC, UA 05/02/2022 1     Epithelial Cells, UA 05/02/2022 0    Admission on 12/11/2021, Discharged on 12/12/2021   Component Date Value    Ventricular Rate 12/11/2021 72     Atrial Rate 12/11/2021 72     P-R Interval 12/11/2021 184     QRS Duration 12/11/2021 110     Q-T Interval 12/11/2021 394     QTc Calculation (Bazett) 12/11/2021 431     P Axis 12/11/2021 35     R Axis 12/11/2021 -29 T Axis 12/11/2021 4     Diagnosis 12/11/2021 Normal sinus rhythmLeft axis deviationWhen compared with ECG of 08-JUN-2020 17:12,No significant change was foundConfirmed by [facility] ([id number]) on 12/12/2021 3:33:02 PM     WBC 12/11/2021 4.8     RBC 12/11/2021 4.38     Hemoglobin 12/11/2021 13.8     Hematocrit 12/11/2021 40.2 (A)    MCV 12/11/2021 91.8     MCH 12/11/2021 31.4     MCHC 12/11/2021 34.2     RDW 12/11/2021 13.5     Platelets 36/51/2031 173     MPV 12/11/2021 7.7     Neutrophils % 12/11/2021 47.9     Lymphocytes % 12/11/2021 36.0     Monocytes % 12/11/2021 8.6     Eosinophils % 12/11/2021 6.5     Basophils % 12/11/2021 1.0     Neutrophils Absolute 12/11/2021 2.3     Lymphocytes Absolute 12/11/2021 1.7     Monocytes Absolute 12/11/2021 0.4     Eosinophils Absolute 12/11/2021 0.3     Basophils Absolute 12/11/2021 0.0     Sodium 12/11/2021 140     Potassium reflex Magnesi* 12/11/2021 3.7     Chloride 12/11/2021 105     CO2 12/11/2021 28     Anion Gap 12/11/2021 7     Glucose 12/11/2021 96     BUN 12/11/2021 14     Creatinine 12/11/2021 0.6 (A)    GFR Non- 12/11/2021 >60     GFR  12/11/2021 >60     Calcium 12/11/2021 9.0     Total Protein 12/11/2021 6.9     Albumin 12/11/2021 3.9     Albumin/Globulin Ratio 12/11/2021 1.3     Total Bilirubin 12/11/2021 0.7     Alkaline Phosphatase 12/11/2021 65     ALT 12/11/2021 19     AST 12/11/2021 26     Troponin 12/11/2021 <0.01     Pro-BNP 12/11/2021 45     D-Dimer, Quant 12/11/2021 <200     Troponin 12/12/2021 <0.01          ASSESSMENT/PLAN  1. Atypical chest pain  Reassuring evaluation December 2021 in the ED. Holly Bloodgood Protonix 20 mg daily without benefit after 1 month trial.  Normal CRP. Negative GXT 2022 excellent effort. Observation only. 2. Cigarette nicotine dependence without complication  Discussed cessation strategies at follow-up.   Half pack per day, 12.5 cumulative pack-year burden not a candidate for screening lung CT however wishes to set a quit date. Non-smoking home. Prescription for 14 mg nicotine patch and Nicotrol inhaler provided at previous visit, he is now ready to pursue Chantix. Prescription provided. Inquire at follow-up as to abstinence. 3. Primary hypertension  Suboptimal control not excessively beta-blocker for which we will continue amlodipine 10 and increase metoprolol from 50 to 100 mg daily. He does not take them at the same time a day. Asked to bring 5 readings to the follow-up appointment in about 4-1/2 weeks. 4. Routine adult health maintenance  Explore colonoscopy on return. Tdap 2014, Pneumovax 23 2015, eligible for [employer] hybrid booster  A1c TSH PSA reassuring 2022. HIV hepatitis C nonreactive 2022. Encouraged to take 1000 to 2000 IU vitamin D daily OTC for vitamin D deficiency. 5. Scrotal pain  Normal UA 2021 and 2022. Wonder about cystoscopy colovesicular fistula. Had no worrisome findings on CT in 2019, we referred to urology who performed cystoscopy 2022, results not available to me. Patient will call urology regarding next steps, findings. 6. History of colonic diverticulitis, apparently complicated  Evidence of sigmoid colectomy on CT 2019. Due for colonoscopy 2022 apparently, will get blood pressure under control first.    7.  Pure hypercholesterolemia. Cardiology kindly started low-dose atorvastatin for 17% 10-year risk heart attack stroke given hypertension age lipid profile and nicotine dependence with slight improvement in LDL from 117 down to 100. But see possible transaminitis effect. 8.  Transaminitis denies nausea anorexia change in bowel or bladder color was prandial abdominal pain. AST greater than ALT. Patient hospitalized for alcoholism with LFTs in the 200s last year but has been abstinent since. Denies supplement use. Denies alcohol relapse. Denies Tylenol use.   Started on statin in the interval, earlier this year normal LFTs, though pattern

## 2022-10-14 DIAGNOSIS — R79.89 ABNORMAL LIVER FUNCTION TEST: ICD-10-CM

## 2022-10-14 LAB
ALBUMIN SERPL-MCNC: 4.4 G/DL (ref 3.4–5)
ALP BLD-CCNC: 79 U/L (ref 40–129)
ALT SERPL-CCNC: 27 U/L (ref 10–40)
AST SERPL-CCNC: 24 U/L (ref 15–37)
BILIRUB SERPL-MCNC: 0.7 MG/DL (ref 0–1)
BILIRUBIN DIRECT: <0.2 MG/DL (ref 0–0.3)
BILIRUBIN, INDIRECT: NORMAL MG/DL (ref 0–1)
HAV IGM SER IA-ACNC: NORMAL
HEPATITIS B CORE IGM ANTIBODY: NORMAL
HEPATITIS B SURFACE ANTIGEN INTERPRETATION: NORMAL
HEPATITIS C ANTIBODY INTERPRETATION: NORMAL
TOTAL PROTEIN: 7.5 G/DL (ref 6.4–8.2)

## 2022-10-17 ENCOUNTER — OFFICE VISIT (OUTPATIENT)
Dept: PRIMARY CARE CLINIC | Age: 52
End: 2022-10-17
Payer: COMMERCIAL

## 2022-10-17 VITALS
BODY MASS INDEX: 31.6 KG/M2 | HEART RATE: 80 BPM | OXYGEN SATURATION: 96 % | WEIGHT: 214 LBS | TEMPERATURE: 97.4 F | DIASTOLIC BLOOD PRESSURE: 90 MMHG | SYSTOLIC BLOOD PRESSURE: 138 MMHG

## 2022-10-17 DIAGNOSIS — J20.8 ACUTE BRONCHITIS, BACTERIAL: ICD-10-CM

## 2022-10-17 DIAGNOSIS — I10 ESSENTIAL HYPERTENSION: ICD-10-CM

## 2022-10-17 DIAGNOSIS — Z12.11 COLON CANCER SCREENING: ICD-10-CM

## 2022-10-17 DIAGNOSIS — I73.9 CLAUDICATION (HCC): ICD-10-CM

## 2022-10-17 DIAGNOSIS — R73.03 PREDIABETES: ICD-10-CM

## 2022-10-17 DIAGNOSIS — R09.89 DECREASED CIRCULATION IN FINGERS OR TOES DUE TO SMOKING: ICD-10-CM

## 2022-10-17 DIAGNOSIS — Z23 NEED FOR INFLUENZA VACCINATION: ICD-10-CM

## 2022-10-17 DIAGNOSIS — F17.200 CURRENT SMOKER: Primary | ICD-10-CM

## 2022-10-17 DIAGNOSIS — B96.89 ACUTE BRONCHITIS, BACTERIAL: ICD-10-CM

## 2022-10-17 DIAGNOSIS — F17.200 DECREASED CIRCULATION IN FINGERS OR TOES DUE TO SMOKING: ICD-10-CM

## 2022-10-17 PROCEDURE — 90674 CCIIV4 VAC NO PRSV 0.5 ML IM: CPT | Performed by: INTERNAL MEDICINE

## 2022-10-17 PROCEDURE — 99214 OFFICE O/P EST MOD 30 MIN: CPT | Performed by: INTERNAL MEDICINE

## 2022-10-17 PROCEDURE — 90471 IMMUNIZATION ADMIN: CPT | Performed by: INTERNAL MEDICINE

## 2022-10-17 RX ORDER — AZITHROMYCIN 250 MG/1
250 TABLET, FILM COATED ORAL SEE ADMIN INSTRUCTIONS
Qty: 6 TABLET | Refills: 0 | Status: SHIPPED | OUTPATIENT
Start: 2022-10-17 | End: 2022-10-22

## 2022-10-17 RX ORDER — LOSARTAN POTASSIUM AND HYDROCHLOROTHIAZIDE 12.5; 5 MG/1; MG/1
1 TABLET ORAL DAILY
Qty: 30 TABLET | Refills: 3 | Status: SHIPPED | OUTPATIENT
Start: 2022-10-17

## 2022-10-17 ASSESSMENT — ENCOUNTER SYMPTOMS
RHINORRHEA: 0
BLURRED VISION: 0
SHORTNESS OF BREATH: 0
SORE THROAT: 0
EYES NEGATIVE: 1
ALLERGIC/IMMUNOLOGIC NEGATIVE: 1
ORTHOPNEA: 0
HEARTBURN: 0
HEMOPTYSIS: 0
WHEEZING: 0
COUGH: 1

## 2022-10-17 NOTE — PROGRESS NOTES
Fransisco Arredondo (:  1970) is a 47 y.o. male,Established patient, here for evaluation of the following chief complaint(s):  Follow-up (Pt is here to follow up on labs) and Congestion (Pt is here with c/o congestion x 1 month)       Discussed quit method  Chantix caused mood swings  Did not try nicoderm since it can cause nightmares as chantix  ASSESSMENT/PLAN:  1. Current smoker with long smoking history we discussed cessation. Chantix because patient does feel agitated and is afraid to try the NicoDerm patch 20 pruritic can cause bad dreams which the Chantix also cause. Currently he smokes 10 cigarettes/day. We will reduce by 1 cigarette/week until he quits. Carolannshellie Parkspaola He is very motivated to quit and would needed to get the early detection lung cancer screen and any aortic aneurysm screening.  -     CT Lung Screen (Initial or Annual); Future  -     VL DUP LOWER EXTREMITY ARTERIES BILATERAL; Future  -     US ABDOMINAL AORTA LIMITED; Future  2. Colon cancer screening with no symptoms of colon cancer Will refer for screening colonoscopy. -     AFL - Traci Dubon MD, Gastroenterology, [facility]  3. Need for influenza vaccination  -     Influenza, FLUCELVAX, (age 10 mo+), IM, Preservative Free, 0.5 mL  4. Prediabetes we will check follow-up A1c no polydipsia or polyuria to suggest progression to diabetes. -     Hemoglobin A1C; Future  5. Claudication [facility]) Long history of smoking and there is no longer present on the lower legs which patient states has been for few years now. No leg heaviness with walking but does have decreased pulses but adequate capillary refill. Hair growth is normal on other parts of the chest.  Concerned about circulation problems. We will check Doppler and lipid profile. 6. Decreased circulation in fingers or toes due to smoking same as problem #5  -     Lipid Panel; Future  -     VL DUP LOWER EXTREMITY ARTERIES BILATERAL; Future  7.  Essential hypertension on a good regimen of amlodipine 10 mg daily and metoprolol  mg daily we will add losartan HCT 50-12.5 mg daily  BP Readings from Last 3 Encounters:   10/17/22 (!) 138/90   09/16/22 (!) 135/91   07/15/22 (!) 133/94       -     losartan-hydroCHLOROthiazide (HYZAAR) 50-12.5 MG per tablet; Take 1 tablet by mouth daily, Disp-30 tablet, R-3Normal  8. Acute bronchitis, bacterial with coughing up phlegm for couple weeks no nasal congestion or body aches or weakness. -     azithromycin (ZITHROMAX) 250 MG tablet; Take 1 tablet by mouth See Admin Instructions for 5 days 500mg on day 1 followed by 250mg on days 2 - 5, Disp-6 tablet, R-0Normal    Return in about 6 weeks (around 11/28/2022). Subjective   SUBJECTIVE/OBJECTIVE:  Cough  This is a chronic problem. The current episode started 1 to 4 weeks ago. The problem has been unchanged. The problem occurs every few hours. Cough characteristics: Productive of white sputum. Pertinent negatives include no chest pain, fever, headaches, heartburn, hemoptysis, myalgias, nasal congestion, postnasal drip, rash, rhinorrhea, sore throat, shortness of breath, sweats, weight loss or wheezing. Nothing aggravates the symptoms. Risk factors for lung disease include smoking/tobacco exposure (Current smoker but motivated to quit. Chantix caused him to feel aggressive and he is afraid to try the NicoDerm patches when he ran it could cause bad dreams since he also has bad dreams with Chantix). He has tried nothing for the symptoms. The treatment provided no relief. Hypertension  This is a chronic problem. The current episode started more than 1 year ago. The problem has been gradually improving since onset. The problem is uncontrolled. Pertinent negatives include no anxiety, blurred vision, chest pain, headaches, malaise/fatigue, neck pain, orthopnea, palpitations, peripheral edema, PND, shortness of breath or sweats. There are no associated agents to hypertension.  Risk factors for coronary artery disease include dyslipidemia. Past treatments include beta blockers, calcium channel blockers and angiotensin blockers. The current treatment provides significant improvement. Hypertensive end-organ damage includes PVD. There is no history of angina, kidney disease, CVA or heart failure. Review of Systems   Constitutional: Negative. Negative for fever, malaise/fatigue and weight loss. HENT: Negative. Negative for postnasal drip, rhinorrhea and sore throat. Eyes: Negative. Negative for blurred vision. Respiratory:  Positive for cough. Negative for hemoptysis, shortness of breath and wheezing. Smokes   Cardiovascular: Negative. Negative for chest pain, palpitations, orthopnea and PND. Recent normal stress test.   Gastrointestinal:  Negative for heartburn. Endocrine:        Prediabetes   Musculoskeletal: Negative. Negative for myalgias and neck pain. Skin:  Negative for rash. Allergic/Immunologic: Negative. Neurological: Negative. Negative for headaches. Hematological: Negative. Psychiatric/Behavioral: Negative. Objective   Physical Exam  Constitutional:       General: He is not in acute distress. Appearance: Normal appearance. He is not ill-appearing, toxic-appearing or diaphoretic. Eyes:      Extraocular Movements: Extraocular movements intact. Conjunctiva/sclera: Conjunctivae normal.      Pupils: Pupils are equal, round, and reactive to light. Neck:      Vascular: No carotid bruit. Cardiovascular:      Pulses:           Femoral pulses are 2+ on the right side and 2+ on the left side. Dorsalis pedis pulses are 0 on the right side and 0 on the left side. Heart sounds: Normal heart sounds. Comments: Good cap refill of feet. , loss of hair on legs , but has chest hair, concern for circulation  Pulmonary:      Effort: Pulmonary effort is normal.      Breath sounds: Normal breath sounds.    Abdominal:      General: Abdomen is flat. Bowel sounds are normal. There is no distension. Palpations: Abdomen is soft. There is no mass. Tenderness: There is no abdominal tenderness. There is no right CVA tenderness, left CVA tenderness, guarding or rebound. Hernia: No hernia is present. Comments: No bruit   Musculoskeletal:         General: Normal range of motion. Cervical back: Normal range of motion and neck supple. No rigidity or tenderness. Lymphadenopathy:      Cervical: No cervical adenopathy. Skin:     General: Skin is warm and dry. Findings: No lesion or rash. Neurological:      General: No focal deficit present. Mental Status: He is alert and oriented to person, place, and time. Cranial Nerves: No cranial nerve deficit. Sensory: No sensory deficit. Motor: No weakness. Gait: Gait normal.   Psychiatric:         Mood and Affect: Mood normal.         Behavior: Behavior normal.         Thought Content: Thought content normal.         Judgment: Judgment normal.                An electronic signature was used to authenticate this note.     --Dorian Brown MD

## 2022-10-28 ENCOUNTER — HOSPITAL ENCOUNTER (OUTPATIENT)
Dept: ULTRASOUND IMAGING | Age: 52
Discharge: HOME | End: 2022-10-28
Payer: COMMERCIAL

## 2022-10-28 DIAGNOSIS — F17.200 CURRENT SMOKER: ICD-10-CM

## 2022-10-28 PROBLEM — I71.40 ABDOMINAL AORTIC ANEURYSM (AAA), 30-34 MM DIAMETER (HCC): Status: ACTIVE | Noted: 2022-10-28

## 2022-10-28 PROCEDURE — 76775 US EXAM ABDO BACK WALL LIM: CPT

## 2022-11-18 ENCOUNTER — HOSPITAL ENCOUNTER (OUTPATIENT)
Dept: CT IMAGING | Age: 52
Discharge: HOME | End: 2022-11-18
Payer: COMMERCIAL

## 2022-11-18 DIAGNOSIS — Z12.11 COLON CANCER SCREENING: ICD-10-CM

## 2022-11-18 DIAGNOSIS — R91.1 LUNG NODULE: ICD-10-CM

## 2022-11-18 DIAGNOSIS — K22.89 ESOPHAGEAL THICKENING: Primary | ICD-10-CM

## 2022-11-18 DIAGNOSIS — F17.200 CURRENT SMOKER: ICD-10-CM

## 2022-11-18 PROCEDURE — 71271 CT THORAX LUNG CANCER SCR C-: CPT

## 2022-11-18 NOTE — PROGRESS NOTES
Patient called and informed of 6 mm right upper lobe pulmonary nodule noncalcified and gave him the number for scheduling to arrange for follow-up CT scan in May 2023 also will have pulmonary follow the patient as well. With the esophageal thickening will add EGD to the colon cancer screening.   Send the referral was printed to see Dr. Bola Peraza for EGD and colonoscopy

## 2022-11-18 NOTE — RESULT ENCOUNTER NOTE
Results discussed with patient to schedule follow-up CT in 6 months which would be May 2023 and patient will call to schedule in advance to follow-up on the 6 mm right upper lobe pulmonary nodule and also referred to pulmonary so pulmonary can follow patient for this nodule. Due to esophageal thickening a new referral for GI was placed to add an EGD to the previous colonoscopy referral.  Patient voiced understanding and will call.

## 2022-11-28 ENCOUNTER — OFFICE VISIT (OUTPATIENT)
Dept: PRIMARY CARE CLINIC | Age: 52
End: 2022-11-28
Payer: COMMERCIAL

## 2022-11-28 VITALS
OXYGEN SATURATION: 98 % | WEIGHT: 215 LBS | SYSTOLIC BLOOD PRESSURE: 120 MMHG | HEART RATE: 90 BPM | BODY MASS INDEX: 31.75 KG/M2 | TEMPERATURE: 97.2 F | DIASTOLIC BLOOD PRESSURE: 80 MMHG

## 2022-11-28 DIAGNOSIS — F17.200 TOBACCO USE DISORDER: ICD-10-CM

## 2022-11-28 DIAGNOSIS — D70.8 OTHER NEUTROPENIA (HCC): ICD-10-CM

## 2022-11-28 DIAGNOSIS — R73.03 PRE-DIABETES: ICD-10-CM

## 2022-11-28 DIAGNOSIS — B96.89 ACUTE BRONCHITIS, BACTERIAL: ICD-10-CM

## 2022-11-28 DIAGNOSIS — I10 PRIMARY HYPERTENSION: Primary | ICD-10-CM

## 2022-11-28 DIAGNOSIS — J20.8 ACUTE BRONCHITIS, BACTERIAL: ICD-10-CM

## 2022-11-28 DIAGNOSIS — D69.6 THROMBOCYTOPENIA (HCC): ICD-10-CM

## 2022-11-28 DIAGNOSIS — E78.2 MIXED HYPERLIPIDEMIA: ICD-10-CM

## 2022-11-28 PROCEDURE — 99214 OFFICE O/P EST MOD 30 MIN: CPT | Performed by: INTERNAL MEDICINE

## 2022-11-28 PROCEDURE — 3074F SYST BP LT 130 MM HG: CPT | Performed by: INTERNAL MEDICINE

## 2022-11-28 PROCEDURE — 3078F DIAST BP <80 MM HG: CPT | Performed by: INTERNAL MEDICINE

## 2022-11-28 RX ORDER — AZITHROMYCIN 250 MG/1
250 TABLET, FILM COATED ORAL SEE ADMIN INSTRUCTIONS
Qty: 6 TABLET | Refills: 0 | Status: SHIPPED | OUTPATIENT
Start: 2022-11-28 | End: 2022-12-03

## 2022-11-28 ASSESSMENT — ENCOUNTER SYMPTOMS
EYES NEGATIVE: 1
RHINORRHEA: 0
SORE THROAT: 0
ALLERGIC/IMMUNOLOGIC NEGATIVE: 1
BLURRED VISION: 0
WHEEZING: 0
SHORTNESS OF BREATH: 0
ORTHOPNEA: 0
COUGH: 1

## 2022-11-28 ASSESSMENT — PATIENT HEALTH QUESTIONNAIRE - PHQ9
1. LITTLE INTEREST OR PLEASURE IN DOING THINGS: 0
SUM OF ALL RESPONSES TO PHQ QUESTIONS 1-9: 0
SUM OF ALL RESPONSES TO PHQ9 QUESTIONS 1 & 2: 0
SUM OF ALL RESPONSES TO PHQ QUESTIONS 1-9: 0
2. FEELING DOWN, DEPRESSED OR HOPELESS: 0
SUM OF ALL RESPONSES TO PHQ QUESTIONS 1-9: 0
SUM OF ALL RESPONSES TO PHQ QUESTIONS 1-9: 0

## 2022-11-28 NOTE — PROGRESS NOTES
Arthur Saucedo (:  1970) is a 47 y.o. male,Established patient, here for evaluation of the following chief complaint(s):  Hypertension         ASSESSMENT/PLAN:  1. Primary hypertension:  needs eye exam with intermittent blurred vision. Controlled now on current regimen, continue. Labs Renal Latest Ref Rng & Units 2022 2022 2021 2021 3/31/2021   BUN 7 - 20 mg/dL 14 13 14 8 8   Cr 0.9 - 1.3 mg/dL 0.8(L) 0.7(L) 0.6(L) 0.7(L) 0.8(L)   K 3.5 - 5.1 mmol/L 4.2 4.2 3.7 3.5 3.8   Na 136 - 145 mmol/L 140 140 140 137 135(L)       -     AFL - Katiana Fish MD, Ophthalmology, [facility]  2. Thrombocytopenia (Thad Horse), no bleeding or bruising, monitor  cbc. Lab Results   Component Value Date    WBC 4.8 2021    HGB 13.8 2021    HCT 40.2 (L) 2021    MCV 91.8 2021     2021      3. Other neutropenia (Thad Horse), will monitor cbc. 4. Pre-diabetes, goal is to get it in non diabetic range. Will order a1c.  -     Hemoglobin A1C; Future  5. Mixed hyperlipidemia on atorvastatin with PAD and scheduled for arterial studies of legs. Will get follow-up lipid profile to make sure at goal with LDL of 70 with PAD. -     Lipid Panel; Future  6. Acute bronchitis, bacterial  -     azithromycin (ZITHROMAX) 250 MG tablet; Take 1 tablet by mouth See Admin Instructions for 5 days 500mg on day 1 followed by 250mg on days 2 - 5, Disp-6 tablet, R-0Normal  7. Tobacco use disorder has prescription for patches and is motivated to use and will start. Discussed importance of not smoking with the patch. He is also scheduled for follow-up CT of the chest in 6 months. Return in about 3 months (around 2023). Subjective   SUBJECTIVE/OBJECTIVE:  Hypertension  This is a chronic problem. The current episode started more than 1 year ago. The problem has been rapidly improving since onset. The problem is controlled.  Pertinent negatives include no anxiety, blurred vision, chest pain, headaches, malaise/fatigue, neck pain, orthopnea, palpitations, peripheral edema, PND, shortness of breath or sweats. There are no associated agents to hypertension. Risk factors for coronary artery disease include dyslipidemia, diabetes mellitus, male gender and smoking/tobacco exposure. Past treatments include angiotensin blockers, diuretics and calcium channel blockers. The current treatment provides significant improvement. There are no compliance problems. There is no history of CVA or heart failure. There is no history of chronic renal disease. Cough  This is a chronic problem. The current episode started more than 1 month ago. The problem has been waxing and waning. The problem occurs every few hours. The cough is Productive of purulent sputum. Pertinent negatives include no chest pain, fever, headaches, myalgias, postnasal drip, rash, rhinorrhea, sore throat, shortness of breath, sweats or wheezing. Nothing aggravates the symptoms. Risk factors for lung disease include animal exposure. He has tried nothing for the symptoms. The treatment provided no relief. Review of Systems   Constitutional: Negative. Negative for fever and malaise/fatigue. HENT: Negative. Negative for postnasal drip, rhinorrhea and sore throat. Eyes: Negative. Negative for blurred vision. Respiratory:  Positive for cough. Negative for shortness of breath and wheezing. Smokes   Cardiovascular: Negative. Negative for chest pain, palpitations, orthopnea and PND. Recent normal stress test.   Endocrine:        Prediabetes   Musculoskeletal: Negative. Negative for myalgias and neck pain. Skin:  Negative for rash. Allergic/Immunologic: Negative. Neurological: Negative. Negative for headaches. Hematological: Negative. Psychiatric/Behavioral: Negative. Objective   Physical Exam  Constitutional:       General: He is not in acute distress. Appearance: Normal appearance.  He is not ill-appearing, toxic-appearing or diaphoretic. Eyes:      Extraocular Movements: Extraocular movements intact. Conjunctiva/sclera: Conjunctivae normal.      Pupils: Pupils are equal, round, and reactive to light. Neck:      Vascular: No carotid bruit. Cardiovascular:      Pulses:           Femoral pulses are 2+ on the right side and 2+ on the left side. Dorsalis pedis pulses are 0 on the right side and 0 on the left side. Heart sounds: Normal heart sounds. Comments: Good cap refill of feet. , loss of hair on legs , but has chest hair, concern for circulation  Pulmonary:      Effort: Pulmonary effort is normal.      Breath sounds: Normal breath sounds. Abdominal:      General: Abdomen is flat. Bowel sounds are normal. There is no distension. Palpations: Abdomen is soft. There is no mass. Tenderness: There is no abdominal tenderness. There is no right CVA tenderness, left CVA tenderness, guarding or rebound. Hernia: No hernia is present. Comments: No bruit   Musculoskeletal:         General: Normal range of motion. Cervical back: Normal range of motion and neck supple. No rigidity or tenderness. Lymphadenopathy:      Cervical: No cervical adenopathy. Skin:     General: Skin is warm and dry. Findings: No lesion or rash. Neurological:      General: No focal deficit present. Mental Status: He is alert and oriented to person, place, and time. Cranial Nerves: No cranial nerve deficit. Sensory: No sensory deficit. Motor: No weakness. Gait: Gait normal.   Psychiatric:         Mood and Affect: Mood normal.         Behavior: Behavior normal.         Thought Content: Thought content normal.         Judgment: Judgment normal.              An electronic signature was used to authenticate this note.     --Shadi De Anda MD

## 2022-12-12 ENCOUNTER — HOSPITAL ENCOUNTER (OUTPATIENT)
Dept: VASCULAR LAB | Age: 52
Discharge: HOME | End: 2022-12-12
Payer: COMMERCIAL

## 2022-12-12 DIAGNOSIS — F17.200 DECREASED CIRCULATION IN FINGERS OR TOES DUE TO SMOKING: ICD-10-CM

## 2022-12-12 DIAGNOSIS — R09.89 DECREASED CIRCULATION IN FINGERS OR TOES DUE TO SMOKING: ICD-10-CM

## 2022-12-12 DIAGNOSIS — F17.200 CURRENT SMOKER: ICD-10-CM

## 2022-12-12 PROCEDURE — 93970 EXTREMITY STUDY: CPT

## 2023-01-20 ENCOUNTER — PATIENT MESSAGE (OUTPATIENT)
Dept: PRIMARY CARE CLINIC | Age: 53
End: 2023-01-20

## 2023-01-20 RX ORDER — AMLODIPINE BESYLATE 10 MG/1
10 TABLET ORAL DAILY
Qty: 90 TABLET | Refills: 1 | Status: SHIPPED | OUTPATIENT
Start: 2023-01-20

## 2023-01-20 NOTE — TELEPHONE ENCOUNTER
From: Gustavo Price  To: Dr. Lopez An2023 8:49 AM EST  Subject: Need medicine    I'm out of amlodipine and my pharmacist said I need another prescription thank you and please

## 2023-01-31 DIAGNOSIS — I10 ESSENTIAL HYPERTENSION: ICD-10-CM

## 2023-01-31 NOTE — TELEPHONE ENCOUNTER
Medication:   Requested Prescriptions     Pending Prescriptions Disp Refills    losartan-hydroCHLOROthiazide (HYZAAR) 50-12.5 MG per tablet 30 tablet 3     Sig: Take 1 tablet by mouth daily        Last Filled:      Patient Phone Number: [phone] (home)     Last appt: 11/28/2022   Next appt: 3/6/2023    Last OARRS: No flowsheet data found.

## 2023-02-01 RX ORDER — LOSARTAN POTASSIUM AND HYDROCHLOROTHIAZIDE 12.5; 5 MG/1; MG/1
1 TABLET ORAL DAILY
Qty: 30 TABLET | Refills: 3 | Status: SHIPPED | OUTPATIENT
Start: 2023-02-01

## 2023-03-03 DIAGNOSIS — D69.6 THROMBOCYTOPENIA (HCC): ICD-10-CM

## 2023-03-03 DIAGNOSIS — R73.03 PRE-DIABETES: ICD-10-CM

## 2023-03-03 DIAGNOSIS — E78.2 MIXED HYPERLIPIDEMIA: ICD-10-CM

## 2023-03-03 LAB
BASOPHILS ABSOLUTE: 0 K/UL (ref 0–0.2)
BASOPHILS RELATIVE PERCENT: 0.8 %
CHOLESTEROL, TOTAL: 151 MG/DL (ref 0–199)
EOSINOPHILS ABSOLUTE: 0.3 K/UL (ref 0–0.6)
EOSINOPHILS RELATIVE PERCENT: 5.1 %
HCT VFR BLD CALC: 40.9 % (ref 40.5–52.5)
HDLC SERPL-MCNC: 54 MG/DL (ref 40–60)
HEMOGLOBIN: 14.1 G/DL (ref 13.5–17.5)
LDL CHOLESTEROL CALCULATED: 84 MG/DL
LYMPHOCYTES ABSOLUTE: 2.5 K/UL (ref 1–5.1)
LYMPHOCYTES RELATIVE PERCENT: 44.3 %
MCH RBC QN AUTO: 31.2 PG (ref 26–34)
MCHC RBC AUTO-ENTMCNC: 34.6 G/DL (ref 31–36)
MCV RBC AUTO: 90.1 FL (ref 80–100)
MONOCYTES ABSOLUTE: 0.5 K/UL (ref 0–1.3)
MONOCYTES RELATIVE PERCENT: 8.1 %
NEUTROPHILS ABSOLUTE: 2.3 K/UL (ref 1.7–7.7)
NEUTROPHILS RELATIVE PERCENT: 41.7 %
PDW BLD-RTO: 13.7 % (ref 12.4–15.4)
PLATELET # BLD: 221 K/UL (ref 135–450)
PMV BLD AUTO: 8.4 FL (ref 5–10.5)
RBC # BLD: 4.53 M/UL (ref 4.2–5.9)
TRIGL SERPL-MCNC: 63 MG/DL (ref 0–150)
VLDLC SERPL CALC-MCNC: 13 MG/DL
WBC # BLD: 5.6 K/UL (ref 4–11)

## 2023-03-04 LAB
ESTIMATED AVERAGE GLUCOSE: 125.5 MG/DL
HBA1C MFR BLD: 6 %

## 2023-03-06 ENCOUNTER — OFFICE VISIT (OUTPATIENT)
Dept: PRIMARY CARE CLINIC | Age: 53
End: 2023-03-06
Payer: COMMERCIAL

## 2023-03-06 VITALS
SYSTOLIC BLOOD PRESSURE: 133 MMHG | BODY MASS INDEX: 34.11 KG/M2 | HEART RATE: 76 BPM | TEMPERATURE: 97.8 F | OXYGEN SATURATION: 98 % | DIASTOLIC BLOOD PRESSURE: 88 MMHG | WEIGHT: 231 LBS

## 2023-03-06 DIAGNOSIS — R31.0 GROSS HEMATURIA: ICD-10-CM

## 2023-03-06 DIAGNOSIS — J98.01 COUGH DUE TO BRONCHOSPASM: ICD-10-CM

## 2023-03-06 DIAGNOSIS — I10 ESSENTIAL HYPERTENSION: ICD-10-CM

## 2023-03-06 DIAGNOSIS — R73.03 PRE-DIABETES: ICD-10-CM

## 2023-03-06 DIAGNOSIS — D69.6 THROMBOCYTOPENIA (HCC): ICD-10-CM

## 2023-03-06 DIAGNOSIS — E78.2 MIXED HYPERLIPIDEMIA: Primary | ICD-10-CM

## 2023-03-06 DIAGNOSIS — F17.200 CURRENT SMOKER: ICD-10-CM

## 2023-03-06 LAB
ALBUMIN SERPL-MCNC: 4.2 G/DL (ref 3.4–5)
ANION GAP SERPL CALCULATED.3IONS-SCNC: 15 MMOL/L (ref 3–16)
BUN BLDV-MCNC: 9 MG/DL (ref 7–20)
CALCIUM SERPL-MCNC: 9.6 MG/DL (ref 8.3–10.6)
CHLORIDE BLD-SCNC: 103 MMOL/L (ref 99–110)
CO2: 25 MMOL/L (ref 21–32)
CREAT SERPL-MCNC: 0.8 MG/DL (ref 0.9–1.3)
GFR SERPL CREATININE-BSD FRML MDRD: >60 ML/MIN/{1.73_M2}
GLUCOSE BLD-MCNC: 79 MG/DL (ref 70–99)
PHOSPHORUS: 4 MG/DL (ref 2.5–4.9)
POTASSIUM SERPL-SCNC: 4 MMOL/L (ref 3.5–5.1)
SODIUM BLD-SCNC: 143 MMOL/L (ref 136–145)

## 2023-03-06 PROCEDURE — 3075F SYST BP GE 130 - 139MM HG: CPT | Performed by: INTERNAL MEDICINE

## 2023-03-06 PROCEDURE — 99214 OFFICE O/P EST MOD 30 MIN: CPT | Performed by: INTERNAL MEDICINE

## 2023-03-06 PROCEDURE — 3079F DIAST BP 80-89 MM HG: CPT | Performed by: INTERNAL MEDICINE

## 2023-03-06 RX ORDER — METOPROLOL SUCCINATE 100 MG/1
100 TABLET, EXTENDED RELEASE ORAL DAILY
Qty: 90 TABLET | Refills: 1 | Status: SHIPPED | OUTPATIENT
Start: 2023-03-06

## 2023-03-06 RX ORDER — LOSARTAN POTASSIUM AND HYDROCHLOROTHIAZIDE 12.5; 1 MG/1; MG/1
1 TABLET ORAL DAILY
Qty: 90 TABLET | Refills: 1 | Status: SHIPPED | OUTPATIENT
Start: 2023-03-06

## 2023-03-06 RX ORDER — FLUTICASONE PROPIONATE AND SALMETEROL 100; 50 UG/1; UG/1
1 POWDER RESPIRATORY (INHALATION) EVERY 12 HOURS
Qty: 1 EACH | Refills: 5 | Status: SHIPPED | OUTPATIENT
Start: 2023-03-06

## 2023-03-06 RX ORDER — ATORVASTATIN CALCIUM 10 MG/1
10 TABLET, FILM COATED ORAL DAILY
Qty: 90 TABLET | Refills: 5 | Status: SHIPPED | OUTPATIENT
Start: 2023-03-06

## 2023-03-06 RX ORDER — AMLODIPINE BESYLATE 10 MG/1
10 TABLET ORAL DAILY
Qty: 90 TABLET | Refills: 1 | Status: SHIPPED | OUTPATIENT
Start: 2023-03-06

## 2023-03-06 SDOH — ECONOMIC STABILITY: FOOD INSECURITY: WITHIN THE PAST 12 MONTHS, YOU WORRIED THAT YOUR FOOD WOULD RUN OUT BEFORE YOU GOT MONEY TO BUY MORE.: NEVER TRUE

## 2023-03-06 SDOH — ECONOMIC STABILITY: INCOME INSECURITY: HOW HARD IS IT FOR YOU TO PAY FOR THE VERY BASICS LIKE FOOD, HOUSING, MEDICAL CARE, AND HEATING?: NOT HARD AT ALL

## 2023-03-06 SDOH — ECONOMIC STABILITY: HOUSING INSECURITY
IN THE LAST 12 MONTHS, WAS THERE A TIME WHEN YOU DID NOT HAVE A STEADY PLACE TO SLEEP OR SLEPT IN A SHELTER (INCLUDING NOW)?: NO

## 2023-03-06 SDOH — ECONOMIC STABILITY: FOOD INSECURITY: WITHIN THE PAST 12 MONTHS, THE FOOD YOU BOUGHT JUST DIDN'T LAST AND YOU DIDN'T HAVE MONEY TO GET MORE.: NEVER TRUE

## 2023-03-06 ASSESSMENT — ENCOUNTER SYMPTOMS
SHORTNESS OF BREATH: 0
EYES NEGATIVE: 1
RHINORRHEA: 0
SORE THROAT: 0
ORTHOPNEA: 0
COUGH: 1
BLURRED VISION: 0
ALLERGIC/IMMUNOLOGIC NEGATIVE: 1
WHEEZING: 1

## 2023-03-06 ASSESSMENT — PATIENT HEALTH QUESTIONNAIRE - PHQ9
SUM OF ALL RESPONSES TO PHQ QUESTIONS 1-9: 0
SUM OF ALL RESPONSES TO PHQ QUESTIONS 1-9: 0
1. LITTLE INTEREST OR PLEASURE IN DOING THINGS: 0
SUM OF ALL RESPONSES TO PHQ9 QUESTIONS 1 & 2: 0
SUM OF ALL RESPONSES TO PHQ QUESTIONS 1-9: 0
SUM OF ALL RESPONSES TO PHQ QUESTIONS 1-9: 0
2. FEELING DOWN, DEPRESSED OR HOPELESS: 0

## 2023-03-06 NOTE — PROGRESS NOTES
Brian Nguyen (:  1970) is a 47 y.o. male,Established patient, here for evaluation of the following chief complaint(s):  Hypertension         ASSESSMENT/PLAN:  1. Mixed hyperlipidemia diet. Therapy, continue. No symptoms of TIA or PAD. Lab Results   Component Value Date    CHOL 151 2023    CHOL 166 2022    CHOL 189 2022     Lab Results   Component Value Date    TRIG 63 2023    TRIG 60 2022    TRIG 89 2022     Lab Results   Component Value Date    HDL 54 2023    HDL 54 2022    HDL 54 2022     Lab Results   Component Value Date    LDLCALC 84 2023    LDLCALC 100 (H) 2022    LDLCALC 117 (H) 2022     Lab Results   Component Value Date    LABVLDL 13 2023    LABVLDL 12 2022    LABVLDL 18 2022     No results found for: CHOLHDLRATIO    -     atorvastatin (LIPITOR) 10 MG tablet; Take 1 tablet by mouth daily, Disp-90 tablet, R-5Normal  2. Essential hypertension not at goal.  Continue amlodipine 10 mg daily and increase losartan HCT from 5012.5 mg daily to 112.5 mg daily. Renal function has been normal but will follow-up. BP Readings from Last 3 Encounters:   23 133/88   22 120/80   10/17/22 (!) 138/90       -     Renal Function Panel; Future  -     amLODIPine (NORVASC) 10 MG tablet; Take 1 tablet by mouth daily, Disp-90 tablet, R-1Normal  -     metoprolol succinate (TOPROL XL) 100 MG extended release tablet; Take 1 tablet by mouth daily, Disp-90 tablet, R-1Normal  -     losartan-hydroCHLOROthiazide (HYZAAR) 100-12.5 MG per tablet; Take 1 tablet by mouth daily, Disp-90 tablet, R-1Normal  3. Pre-diabetes under good control we will have patient start walking 30 minutes daily continue diet. And continue to monitor. Reviewed recent A1c from this month with patient.     Lab Results   Component Value Date    LABA1C 6.0 2023    LABA1C 5.8 2022     Lab Results   Component Value Date    LABMICR <1.20 05/23/2022    LDLCALC 84 03/03/2023    CREATININE 0.8 (L) 09/12/2022      4. Thrombocytopenia ([address] resolved. Continue to monitor. Lab Results   Component Value Date    WBC 5.6 03/03/2023    HGB 14.1 03/03/2023    HCT 40.9 03/03/2023    MCV 90.1 03/03/2023     03/03/2023       5. Gross hematuria intermittently had a severe episode in June and he saw urologist, Dr. Umair Baron who did a cystoscopy that was normal.  Since patient continues to have episodes without pain will get CT abdomen pelvis with IV contrast and will also get urine culture although has not had pyuria and. And get GC chlamydia screen as well, low risk patient but will for completeness of hematuria work-up. Also needed hemoglobinopathy screen for sickle cell trait can cause this intermittently. -     CT ABDOMEN PELVIS W IV CONTRAST Additional Contrast? Radiologist Recommendation; Future  -     Culture, Urine; Future  -     Hemoglobinopathy Evaluation; Future  6. Cough due to bronchospasm, patient has mild emphysema there is not symptomatic but has bronchospasm occasional wheezing. No dyspnea orthopnea or leg edema. We will start Advair discus  -     fluticasone-salmeterol (ADVAIR DISKUS) 100-50 MCG/ACT AEPB diskus inhaler; Inhale 1 puff into the lungs in the morning and 1 puff in the evening., Disp-1 each, R-5Normal  7. Current smoker, discussed smoking cessation. Patient had nightmares with Chantix. Patient smokes 10 cigarettes a day. Will reduce the amount of cigarettes smoked every 2 days x 1 cigarette. Patient is highly motivated and willing to try the smoking cessation technique.  -     Alessandro Oliver; Future      Return in about 3 months (around 6/6/2023). Subjective   SUBJECTIVE/OBJECTIVE:  Hypertension  This is a chronic problem. The current episode started more than 1 year ago (For more than a year. ). The problem is uncontrolled.  Pertinent negatives include no anxiety, blurred vision, chest pain, headaches, malaise/fatigue, neck pain, orthopnea, palpitations, peripheral edema, PND, shortness of breath or sweats. There are no associated agents to hypertension. Past treatments include angiotensin blockers, diuretics and calcium channel blockers. The current treatment provides moderate improvement. There are no compliance problems. There is no history of kidney disease, CVA or PVD. There is no history of chronic renal disease. Hyperlipidemia  This is a chronic problem. The current episode started more than 1 year ago. The problem is controlled. Recent lipid tests were reviewed and are normal. He has no history of chronic renal disease, diabetes, hypothyroidism, liver disease, obesity or nephrotic syndrome. There are no known factors aggravating his hyperlipidemia. Pertinent negatives include no chest pain, myalgias or shortness of breath. Current antihyperlipidemic treatment includes statins. The current treatment provides significant improvement of lipids. Risk factors for coronary artery disease include dyslipidemia and hypertension. Review of Systems   Constitutional: Negative. Negative for fever and malaise/fatigue. HENT: Negative. Negative for postnasal drip, rhinorrhea and sore throat. Eyes: Negative. Negative for blurred vision. Respiratory:  Positive for cough and wheezing. Negative for shortness of breath. Smokes   Cardiovascular: Negative. Negative for chest pain, palpitations, orthopnea and PND. Recent normal stress test.   Endocrine:        Prediabetes   Musculoskeletal: Negative. Negative for myalgias and neck pain. Skin: Negative. Negative for rash. Allergic/Immunologic: Negative. Neurological: Negative. Negative for headaches. Hematological: Negative. Psychiatric/Behavioral: Negative. Objective   Physical Exam  Constitutional:       General: He is not in acute distress. Appearance: Normal appearance.  He is not ill-appearing, toxic-appearing or diaphoretic. Eyes:      Extraocular Movements: Extraocular movements intact. Conjunctiva/sclera: Conjunctivae normal.      Pupils: Pupils are equal, round, and reactive to light. Neck:      Vascular: No carotid bruit. Cardiovascular:      Pulses:           Femoral pulses are 2+ on the right side and 2+ on the left side. Dorsalis pedis pulses are 1+ on the right side and 1+ on the left side. Heart sounds: Normal heart sounds. Comments: Good cap refill of feet. , loss of hair on legs , but has chest hair, concern for circulation  Pulmonary:      Effort: Pulmonary effort is normal.      Breath sounds: Normal breath sounds. Abdominal:      General: Abdomen is flat. Bowel sounds are normal. There is no distension. Palpations: Abdomen is soft. There is no mass. Tenderness: There is no abdominal tenderness. There is no right CVA tenderness, left CVA tenderness, guarding or rebound. Hernia: No hernia is present. Comments: No bruit   Musculoskeletal:         General: Normal range of motion. Cervical back: Normal range of motion and neck supple. No rigidity or tenderness. Lymphadenopathy:      Cervical: No cervical adenopathy. Skin:     General: Skin is warm and dry. Findings: No lesion or rash. Neurological:      General: No focal deficit present. Mental Status: He is alert and oriented to person, place, and time. Cranial Nerves: No cranial nerve deficit. Sensory: No sensory deficit. Motor: No weakness. Gait: Gait normal.   Psychiatric:         Mood and Affect: Mood normal.         Behavior: Behavior normal.         Thought Content: Thought content normal.         Judgment: Judgment normal.                An electronic signature was used to authenticate this note.     --Mark Walker MD

## 2023-03-06 NOTE — PATIENT INSTRUCTIONS
Low carbohydrate low fat diet:  Drink only water. You can have home made tea or lemonade. Only yaquelin with Stevia  Try to drink 8 glasses of water daily. Each meal or snack should consist of 3/4 green vegetables and 1/4 lean protein. Lean protein :  Eggs, fish with fins, chicken or turkey breast without out skin, beef or pork. Means and poultry should be lean and broiled, so the fat fall away and not consumed by you. Eat only the berries for fruit , since the berries are low in carbohydrates. You can pick from a variety of berries such as blue berries, black berries, strawberries, raspberries.  acai berries and etc.

## 2023-03-07 LAB
HEMOGLOBIN ELECTROPHORESIS: NORMAL
HGB ELECTROPHORESIS INTERP: NORMAL
URINE CULTURE, ROUTINE: NORMAL

## 2023-03-27 ENCOUNTER — HOSPITAL ENCOUNTER (OUTPATIENT)
Dept: CT IMAGING | Age: 53
Discharge: HOME | End: 2023-03-27
Payer: COMMERCIAL

## 2023-03-27 DIAGNOSIS — R31.0 GROSS HEMATURIA: ICD-10-CM

## 2023-03-27 PROCEDURE — 74178 CT ABD&PLV WO CNTR FLWD CNTR: CPT | Performed by: INTERNAL MEDICINE

## 2023-03-27 PROCEDURE — 6360000004 HC RX CONTRAST MEDICATION: Performed by: INTERNAL MEDICINE

## 2023-03-27 RX ADMIN — IOPAMIDOL 75 ML: 755 INJECTION, SOLUTION INTRAVENOUS at 08:38

## 2023-04-28 ENCOUNTER — TELEPHONE (OUTPATIENT)
Dept: CASE MANAGEMENT | Age: 53
End: 2023-04-28

## 2023-04-28 NOTE — TELEPHONE ENCOUNTER
First Lung Cancer Screening Recommendation Reminder Letter mailed to patient. Most recent smoking history reviewed and ALA smoking cessation [id number] [facility] [address] class information mailed to patient.

## 2023-05-01 ENCOUNTER — OFFICE VISIT (OUTPATIENT)
Dept: PULMONOLOGY | Age: 53
End: 2023-05-01
Payer: COMMERCIAL

## 2023-05-01 VITALS
WEIGHT: 214.2 LBS | DIASTOLIC BLOOD PRESSURE: 70 MMHG | OXYGEN SATURATION: 97 % | BODY MASS INDEX: 31.73 KG/M2 | SYSTOLIC BLOOD PRESSURE: 120 MMHG | HEIGHT: 69 IN | HEART RATE: 92 BPM | RESPIRATION RATE: 18 BRPM | TEMPERATURE: 97.1 F

## 2023-05-01 DIAGNOSIS — R91.1 LUNG NODULE: Primary | ICD-10-CM

## 2023-05-01 DIAGNOSIS — J43.2 CENTRILOBULAR EMPHYSEMA (HCC): ICD-10-CM

## 2023-05-01 DIAGNOSIS — Z72.0 TOBACCO ABUSE: ICD-10-CM

## 2023-05-01 PROCEDURE — 99204 OFFICE O/P NEW MOD 45 MIN: CPT | Performed by: INTERNAL MEDICINE

## 2023-05-01 PROCEDURE — 3074F SYST BP LT 130 MM HG: CPT | Performed by: INTERNAL MEDICINE

## 2023-05-01 PROCEDURE — 3078F DIAST BP <80 MM HG: CPT | Performed by: INTERNAL MEDICINE

## 2023-05-01 NOTE — PROGRESS NOTES
Pulmonary Consult           REASON FOR CONSULTATION:  Chief Complaint   Patient presents with    New Patient     Lung nodule        Consult at request of Ana Swan MD     PCP: Ana Swan MD        Assessment and Plan:   Diagnosis Orders   1. Lung nodule        2. Centrilobular emphysema ([address]  Full PFT Study With Bronchodilator      3. Tobacco abuse            Plan:  6-month follow-up CT scan scheduled in May to confirm stability of his lung nodule. PFT to establish his lung function. He was strongly advised to quit smoking more than 3 minutes of counseling was given. HISTORY OF PRESENT ILLNESS: Yin Beach is very pleasant 47y.o. year old -American gentleman with medical history stated below significant for chronic active smoker with more than 30 PY history of smoking, was referred to us for 6 mm lung nodule and emphysema. CT scan which I personally reviewed showed 6 mm right upper lobe medial lung nodule additionally there is bibasilar nodular-like scarring especially on the left. Emphysema noted. He does not complain of significant shortness of breath. He is trying to quit smoking. He is not currently on any inhalers. He works as a       Past Medical History:   Diagnosis Date    Alcohol abuse     Depression     Diverticulitis     Hyperlipidemia     Hypertension        Past Surgical History:   Procedure Laterality Date    COLECTOMY      HAND SURGERY Right 9/2/14    Middle finger revision amputation       family history includes Coronary Art Dis in his father; Diabetes in his mother. SOCIAL HISTORY:   reports that he has been smoking cigarettes. He has a 10.00 pack-year smoking history. He has never used smokeless tobacco.      ALLERGIES:  Patient has No Known Allergies.     REVIEW OF SYSTEMS:  Constitutional: Negative for fever, no

## 2023-05-05 ENCOUNTER — HOSPITAL ENCOUNTER (OUTPATIENT)
Dept: PULMONOLOGY | Age: 53
Discharge: HOME | End: 2023-05-05
Payer: COMMERCIAL

## 2023-05-05 PROBLEM — J43.2 CENTRILOBULAR EMPHYSEMA (HCC): Status: ACTIVE | Noted: 2023-05-05

## 2023-05-05 LAB
DLCO %PRED: 90 %
DLCO PRED: NORMAL
DLCO/VA %PRED: NORMAL
DLCO/VA PRED: NORMAL
DLCO/VA: NORMAL
DLCO: NORMAL
EXPIRATORY TIME-POST: NORMAL
EXPIRATORY TIME: NORMAL
FEF 25-75% %CHNG: NORMAL
FEF 25-75% %PRED-POST: NORMAL
FEF 25-75% %PRED-PRE: NORMAL
FEF 25-75% PRED: NORMAL
FEF 25-75%-POST: NORMAL
FEF 25-75%-PRE: NORMAL
FEV1 %PRED-POST: 93 %
FEV1 %PRED-PRE: 90 %
FEV1 PRED: NORMAL
FEV1-POST: NORMAL
FEV1-PRE: NORMAL
FEV1/FVC %PRED-POST: NORMAL
FEV1/FVC %PRED-PRE: NORMAL
FEV1/FVC PRED: NORMAL
FEV1/FVC-POST: NORMAL
FEV1/FVC-PRE: NORMAL
FVC %PRED-POST: 83 L
FVC %PRED-PRE: 84 %
FVC PRED: NORMAL
FVC-POST: NORMAL
FVC-PRE: NORMAL
GAW %PRED: NORMAL
GAW PRED: NORMAL
GAW: NORMAL
IC %PRED: NORMAL
IC PRED: NORMAL
IC: NORMAL
MEP: NORMAL
MIP: NORMAL
MVV %PRED-PRE: NORMAL
MVV PRED: NORMAL
MVV-PRE: NORMAL
PEF %PRED-POST: NORMAL
PEF %PRED-PRE: NORMAL
PEF PRED: NORMAL
PEF%CHNG: NORMAL
PEF-POST: NORMAL
PEF-PRE: NORMAL
RAW %PRED: NORMAL
RAW PRED: NORMAL
RAW: NORMAL
RV %PRED: NORMAL
RV PRED: NORMAL
RV: NORMAL
SVC %PRED: NORMAL
SVC PRED: NORMAL
SVC: NORMAL
TLC %PRED: 84 %
TLC PRED: NORMAL
TLC: NORMAL
VA %PRED: NORMAL
VA PRED: NORMAL
VA: NORMAL
VTG %PRED: NORMAL
VTG PRED: NORMAL
VTG: NORMAL

## 2023-05-05 PROCEDURE — 94729 DIFFUSING CAPACITY: CPT

## 2023-05-05 PROCEDURE — 94640 AIRWAY INHALATION TREATMENT: CPT

## 2023-05-05 PROCEDURE — 6370000000 HC RX 637 (ALT 250 FOR IP): Performed by: INTERNAL MEDICINE

## 2023-05-05 PROCEDURE — 94726 PLETHYSMOGRAPHY LUNG VOLUMES: CPT

## 2023-05-05 PROCEDURE — 94060 EVALUATION OF WHEEZING: CPT

## 2023-05-05 RX ORDER — ALBUTEROL SULFATE 90 UG/1
4 AEROSOL, METERED RESPIRATORY (INHALATION) ONCE
Status: COMPLETED | OUTPATIENT
Start: 2023-05-05 | End: 2023-05-05

## 2023-05-05 RX ADMIN — ALBUTEROL SULFATE 4 PUFF: 90 AEROSOL, METERED RESPIRATORY (INHALATION) at 12:32

## 2023-05-05 ASSESSMENT — PULMONARY FUNCTION TESTS
FEV1_PERCENT_PREDICTED_PRE: 90
FEV1_PERCENT_PREDICTED_POST: 93
FVC_PERCENT_PREDICTED_PRE: 84
FVC_PERCENT_PREDICTED_POST: 83

## 2023-05-05 NOTE — PROCEDURES
Reason for PFT:  Emphysema    Spirometry  Spirometry is normal.  There is no demonstrable obstructive defect. Lung Volumes  Lung volumes are normal.    Bronchodilator  There is no response to bronchodilator demonstrated. [Increase in FEV1 and/or FVC => 12% of control and => 200 ml]    Flow-Volume Loop  The flow-volume loop is normal.    Diffusing Capacity  Diffusing capacity is normal.    Overall Interpretation  No obstruction is present    No restriction is present    There is not a bronchodilator response present    Diffusion capacity is normal    FEV1 is 2.90 L at 90% predicted. FVC is 3.42 L at 84% predicted    FEV1/FVC ratio is 85    Normal study despite poor exhalation time         Obstruction severity and Restriction Severity using FEV1 %  Gold Stage Severity per ERS/ATS FEV1 % per ERS/ATS   GOLD I:  FEV1>80% Mild COPD >70   GOLD II:  FEV1 50-79% Moderate 50-70   GOLD III:  FEV1 30-49% Severe 35-50   GOLD IV:  FEV1 <30 Very Severe  <35       DCLO:  Normal:  %  Mild:  65-70%  Moderate:  41-60%  Severe:  <40%    PFT data will be scanned into the media tab in epic.     Radha Thapa [address] Pulmonology

## 2023-05-15 ENCOUNTER — HOSPITAL ENCOUNTER (OUTPATIENT)
Dept: CT IMAGING | Age: 53
Discharge: HOME | End: 2023-05-15
Payer: COMMERCIAL

## 2023-05-15 DIAGNOSIS — R91.1 LUNG NODULE: ICD-10-CM

## 2023-05-15 PROCEDURE — 71250 CT THORAX DX C-: CPT

## 2023-06-05 ENCOUNTER — OFFICE VISIT (OUTPATIENT)
Dept: PRIMARY CARE CLINIC | Age: 53
End: 2023-06-05
Payer: COMMERCIAL

## 2023-06-05 VITALS
DIASTOLIC BLOOD PRESSURE: 78 MMHG | SYSTOLIC BLOOD PRESSURE: 112 MMHG | TEMPERATURE: 97.9 F | WEIGHT: 212 LBS | HEART RATE: 84 BPM | OXYGEN SATURATION: 98 % | BODY MASS INDEX: 31.31 KG/M2

## 2023-06-05 DIAGNOSIS — R73.03 PRE-DIABETES: Primary | ICD-10-CM

## 2023-06-05 DIAGNOSIS — I71.40 ABDOMINAL AORTIC ANEURYSM (AAA), 30-34 MM DIAMETER (HCC): ICD-10-CM

## 2023-06-05 DIAGNOSIS — J43.2 CENTRILOBULAR EMPHYSEMA (HCC): ICD-10-CM

## 2023-06-05 DIAGNOSIS — Z71.6 ENCOUNTER FOR SMOKING CESSATION COUNSELING: ICD-10-CM

## 2023-06-05 DIAGNOSIS — I10 ESSENTIAL HYPERTENSION: ICD-10-CM

## 2023-06-05 LAB — HBA1C MFR BLD: 6.1 %

## 2023-06-05 PROCEDURE — 3074F SYST BP LT 130 MM HG: CPT | Performed by: INTERNAL MEDICINE

## 2023-06-05 PROCEDURE — 83036 HEMOGLOBIN GLYCOSYLATED A1C: CPT | Performed by: INTERNAL MEDICINE

## 2023-06-05 PROCEDURE — 3078F DIAST BP <80 MM HG: CPT | Performed by: INTERNAL MEDICINE

## 2023-06-05 PROCEDURE — 99214 OFFICE O/P EST MOD 30 MIN: CPT | Performed by: INTERNAL MEDICINE

## 2023-06-05 ASSESSMENT — ENCOUNTER SYMPTOMS
BLURRED VISION: 0
ALLERGIC/IMMUNOLOGIC NEGATIVE: 1
ORTHOPNEA: 0
EYES NEGATIVE: 1
COUGH: 0
RHINORRHEA: 0
SORE THROAT: 0
WHEEZING: 0
VISUAL CHANGE: 0
SHORTNESS OF BREATH: 0

## 2023-06-05 NOTE — PROGRESS NOTES
Palpations: Abdomen is soft. There is no mass. Tenderness: There is no abdominal tenderness. There is no right CVA tenderness, left CVA tenderness, guarding or rebound. Hernia: No hernia is present. Comments: No bruit   Musculoskeletal:         General: Normal range of motion. Cervical back: Normal range of motion and neck supple. No rigidity or tenderness. Lymphadenopathy:      Cervical: No cervical adenopathy. Skin:     General: Skin is warm and dry. Findings: No lesion or rash. Neurological:      General: No focal deficit present. Mental Status: He is alert and oriented to person, place, and time. Cranial Nerves: No cranial nerve deficit. Sensory: No sensory deficit. Motor: No weakness. Gait: Gait normal.   Psychiatric:         Mood and Affect: Mood normal.         Behavior: Behavior normal.         Thought Content: Thought content normal.         Judgment: Judgment normal.              An electronic signature was used to authenticate this note.     --Amber Ferrer MD

## 2023-06-20 DIAGNOSIS — R91.1 NODULE OF RIGHT LUNG: Primary | ICD-10-CM

## 2023-07-10 DIAGNOSIS — I10 ESSENTIAL HYPERTENSION: ICD-10-CM

## 2023-07-11 DIAGNOSIS — I10 ESSENTIAL HYPERTENSION: ICD-10-CM

## 2023-07-11 RX ORDER — AMLODIPINE BESYLATE 10 MG/1
10 TABLET ORAL DAILY
Qty: 90 TABLET | Refills: 1 | OUTPATIENT
Start: 2023-07-11

## 2023-07-11 NOTE — TELEPHONE ENCOUNTER
Future Appointments    Encounter Information    Provider Department Appt Notes   8/21/2023 Jennifer Goetz, [address] [facility] Pulmonology Sleep and Critical Care lung nodule 3 m   9/5/2023 Paco Kevin MD [address] [facility] Primary Care Return in about 3 months     Past Visits    Date Provider Specialty Visit Type Primary Dx   06/05/2023 Paco Kevin MD Primary Care Office Visit Pre-diabetes

## 2023-07-12 RX ORDER — AMLODIPINE BESYLATE 10 MG/1
TABLET ORAL
Qty: 90 TABLET | Refills: 0 | Status: SHIPPED | OUTPATIENT
Start: 2023-07-12

## 2023-07-12 RX ORDER — AMLODIPINE BESYLATE 10 MG/1
10 TABLET ORAL DAILY
Qty: 90 TABLET | Refills: 1 | Status: SHIPPED | OUTPATIENT
Start: 2023-07-12

## 2023-08-21 ENCOUNTER — OFFICE VISIT (OUTPATIENT)
Dept: PULMONOLOGY | Age: 53
End: 2023-08-21
Payer: COMMERCIAL

## 2023-08-21 VITALS
BODY MASS INDEX: 31.52 KG/M2 | RESPIRATION RATE: 18 BRPM | HEIGHT: 69 IN | HEART RATE: 78 BPM | WEIGHT: 212.8 LBS | OXYGEN SATURATION: 94 % | SYSTOLIC BLOOD PRESSURE: 118 MMHG | DIASTOLIC BLOOD PRESSURE: 68 MMHG | TEMPERATURE: 97.8 F

## 2023-08-21 DIAGNOSIS — J43.2 CENTRILOBULAR EMPHYSEMA (HCC): ICD-10-CM

## 2023-08-21 DIAGNOSIS — R91.1 LUNG NODULE: Primary | ICD-10-CM

## 2023-08-21 DIAGNOSIS — Z72.0 TOBACCO ABUSE: ICD-10-CM

## 2023-08-21 PROCEDURE — 3074F SYST BP LT 130 MM HG: CPT | Performed by: INTERNAL MEDICINE

## 2023-08-21 PROCEDURE — 99406 BEHAV CHNG SMOKING 3-10 MIN: CPT | Performed by: INTERNAL MEDICINE

## 2023-08-21 PROCEDURE — 3078F DIAST BP <80 MM HG: CPT | Performed by: INTERNAL MEDICINE

## 2023-08-21 PROCEDURE — 99214 OFFICE O/P EST MOD 30 MIN: CPT | Performed by: INTERNAL MEDICINE

## 2023-08-21 NOTE — PROGRESS NOTES
Pulmonary Progress note           REASON FOR CONSULTATION:  Chief Complaint   Patient presents with    Follow-up     Lung Nodule        Consult at request of Anthony Anders MD     PCP: Anthony Anders MD        Assessment and Plan:   Diagnosis Orders   1. Lung nodule  CT CHEST WO CONTRAST      2. Tobacco abuse        3. Centrilobular emphysema ([address])              Plan:  12-month follow-up CT scan scheduled in May to confirm stability of his lung nodule. He was strongly advised to quit smoking more than 3 minutes of counseling was given. HISTORY OF PRESENT ILLNESS: James Avendaño is very pleasant 47y.o. year old -American gentleman with medical history stated below significant for chronic active smoker with more than 30 PY history of smoking, was referred to us for 6 mm lung nodule and emphysema. CT scan which I personally reviewed showed 6 mm right upper lobe medial lung nodule additionally there is bibasilar nodular-like scarring especially on the left. Emphysema noted. It was noted back in 2015 CT chest done @    Stable to decreased size of right upper lobe 3 mm subpleural nodule after 14 months. According to [facility] guidelines, no further follow-up is necessary. He does not complain of significant shortness of breath. He is trying to quit smoking. He is not currently on any inhalers. He works as a       Past Medical History:   Diagnosis Date    Alcohol abuse     Centrilobular emphysema ([address]) 5/5/2023    Depression     Diverticulitis     Hyperlipidemia     Hypertension        Past Surgical History:   Procedure Laterality Date    COLECTOMY      HAND SURGERY Right 9/2/14    Middle finger revision amputation       family history includes Coronary Art Dis in his father; Diabetes in his mother.       SOCIAL HISTORY:   reports that he has

## 2023-09-05 ENCOUNTER — OFFICE VISIT (OUTPATIENT)
Dept: PRIMARY CARE CLINIC | Age: 53
End: 2023-09-05
Payer: COMMERCIAL

## 2023-09-05 VITALS
OXYGEN SATURATION: 98 % | BODY MASS INDEX: 31.4 KG/M2 | HEART RATE: 81 BPM | HEIGHT: 69 IN | WEIGHT: 212 LBS | DIASTOLIC BLOOD PRESSURE: 80 MMHG | TEMPERATURE: 97.9 F | SYSTOLIC BLOOD PRESSURE: 110 MMHG

## 2023-09-05 DIAGNOSIS — R73.03 PREDIABETES: ICD-10-CM

## 2023-09-05 DIAGNOSIS — E78.2 MIXED HYPERLIPIDEMIA: ICD-10-CM

## 2023-09-05 DIAGNOSIS — J43.2 CENTRILOBULAR EMPHYSEMA (HCC): ICD-10-CM

## 2023-09-05 DIAGNOSIS — I10 ESSENTIAL HYPERTENSION: ICD-10-CM

## 2023-09-05 DIAGNOSIS — D69.6 THROMBOCYTOPENIA (HCC): ICD-10-CM

## 2023-09-05 DIAGNOSIS — D69.6 THROMBOCYTOPENIA (HCC): Primary | ICD-10-CM

## 2023-09-05 DIAGNOSIS — Z71.6 ENCOUNTER FOR SMOKING CESSATION COUNSELING: ICD-10-CM

## 2023-09-05 DIAGNOSIS — M79.671 RIGHT FOOT PAIN: ICD-10-CM

## 2023-09-05 LAB
ALBUMIN SERPL-MCNC: 4.2 G/DL (ref 3.4–5)
ANION GAP SERPL CALCULATED.3IONS-SCNC: 8 MMOL/L (ref 3–16)
BASOPHILS # BLD: 0 K/UL (ref 0–0.2)
BASOPHILS NFR BLD: 0.9 %
BUN SERPL-MCNC: 12 MG/DL (ref 7–20)
CALCIUM SERPL-MCNC: 9.2 MG/DL (ref 8.3–10.6)
CHLORIDE SERPL-SCNC: 107 MMOL/L (ref 99–110)
CHOLEST SERPL-MCNC: 154 MG/DL (ref 0–199)
CO2 SERPL-SCNC: 26 MMOL/L (ref 21–32)
CREAT SERPL-MCNC: 0.7 MG/DL (ref 0.9–1.3)
DEPRECATED RDW RBC AUTO: 14.3 % (ref 12.4–15.4)
EOSINOPHIL # BLD: 0.2 K/UL (ref 0–0.6)
EOSINOPHIL NFR BLD: 4.6 %
GFR SERPLBLD CREATININE-BSD FMLA CKD-EPI: >60 ML/MIN/{1.73_M2}
GLUCOSE SERPL-MCNC: 115 MG/DL (ref 70–99)
HCT VFR BLD AUTO: 40.8 % (ref 40.5–52.5)
HDLC SERPL-MCNC: 53 MG/DL (ref 40–60)
HGB BLD-MCNC: 14.2 G/DL (ref 13.5–17.5)
LDLC SERPL CALC-MCNC: 88 MG/DL
LYMPHOCYTES # BLD: 1.8 K/UL (ref 1–5.1)
LYMPHOCYTES NFR BLD: 37.7 %
MCH RBC QN AUTO: 31.9 PG (ref 26–34)
MCHC RBC AUTO-ENTMCNC: 34.8 G/DL (ref 31–36)
MCV RBC AUTO: 91.8 FL (ref 80–100)
MONOCYTES # BLD: 0.4 K/UL (ref 0–1.3)
MONOCYTES NFR BLD: 8.4 %
NEUTROPHILS # BLD: 2.4 K/UL (ref 1.7–7.7)
NEUTROPHILS NFR BLD: 48.4 %
PHOSPHATE SERPL-MCNC: 3.6 MG/DL (ref 2.5–4.9)
PLATELET # BLD AUTO: 193 K/UL (ref 135–450)
PMV BLD AUTO: 8.8 FL (ref 5–10.5)
POTASSIUM SERPL-SCNC: 3.9 MMOL/L (ref 3.5–5.1)
RBC # BLD AUTO: 4.45 M/UL (ref 4.2–5.9)
SODIUM SERPL-SCNC: 141 MMOL/L (ref 136–145)
TRIGL SERPL-MCNC: 63 MG/DL (ref 0–150)
VLDLC SERPL CALC-MCNC: 13 MG/DL
WBC # BLD AUTO: 4.9 K/UL (ref 4–11)

## 2023-09-05 PROCEDURE — 3079F DIAST BP 80-89 MM HG: CPT | Performed by: INTERNAL MEDICINE

## 2023-09-05 PROCEDURE — 3074F SYST BP LT 130 MM HG: CPT | Performed by: INTERNAL MEDICINE

## 2023-09-05 PROCEDURE — 99214 OFFICE O/P EST MOD 30 MIN: CPT | Performed by: INTERNAL MEDICINE

## 2023-09-05 ASSESSMENT — ENCOUNTER SYMPTOMS
SORE THROAT: 0
WHEEZING: 0
COUGH: 0
SHORTNESS OF BREATH: 0
VISUAL CHANGE: 0
ORTHOPNEA: 0
ALLERGIC/IMMUNOLOGIC NEGATIVE: 1
EYES NEGATIVE: 1
BLURRED VISION: 0
RHINORRHEA: 0

## 2023-09-05 NOTE — PROGRESS NOTES
polyuria, no visual change, no weakness and no weight loss. There are no hypoglycemic complications. Symptoms are stable. Pertinent negatives for diabetic complications include no CVA or PVD. There are no known risk factors for coronary artery disease. Current diabetic treatment includes diet. He is compliant with treatment most of the time. He is following a generally healthy diet. Meal planning includes avoidance of concentrated sweets. An ACE inhibitor/angiotensin II receptor blocker is being taken. Review of Systems   Constitutional:  Negative for fatigue, fever, malaise/fatigue and weight loss. HENT: Negative. Negative for postnasal drip, rhinorrhea and sore throat. Eyes: Negative. Negative for blurred vision. Respiratory:  Negative for cough, shortness of breath and wheezing. Smokes   Cardiovascular: Negative. Negative for chest pain, palpitations, orthopnea and PND. Recent normal stress test.   Gastrointestinal:         Colonoscopy with Dr. Dc Townsend 12/2/2022 with polypectomy needs repeat 12/2/2027   Endocrine: Negative for polydipsia, polyphagia and polyuria. Prediabetes   Musculoskeletal: Negative. Negative for myalgias and neck pain. Skin: Negative. Negative for rash. Allergic/Immunologic: Negative. Neurological: Negative. Negative for weakness and headaches. Hematological: Negative. Psychiatric/Behavioral: Negative. Objective   Physical Exam  Constitutional:       General: He is not in acute distress. Appearance: Normal appearance. He is not ill-appearing, toxic-appearing or diaphoretic. Eyes:      Extraocular Movements: Extraocular movements intact. Conjunctiva/sclera: Conjunctivae normal.      Pupils: Pupils are equal, round, and reactive to light. Neck:      Vascular: No carotid bruit. Cardiovascular:      Pulses:           Femoral pulses are 2+ on the right side and 2+ on the left side.        Dorsalis pedis pulses are 1+ on the

## 2023-09-06 LAB
EST. AVERAGE GLUCOSE BLD GHB EST-MCNC: 131.2 MG/DL
HBA1C MFR BLD: 6.2 %

## 2023-09-07 NOTE — RESULT ENCOUNTER NOTE
Normal kidney blood tests. The a1c is good in the prediabetic range. My goal is to get it to the normal non diabetic range. Double down on diet and exercise. You can do it! Normal cholesterol. Normal red and white blood cell count.

## 2023-10-05 DIAGNOSIS — I10 ESSENTIAL HYPERTENSION: ICD-10-CM

## 2023-10-05 NOTE — TELEPHONE ENCOUNTER
Future Appointments    Encounter Information    Provider Department Appt Notes   12/5/2023 Eren Ruiz MD [address] [facility] Primary Care Return in 3 months   5/28/2024 Allyson Goode MD [facility] Pulmonology Sleep and Critical Care 9 mo fu Lung Nodule     Past Visits    Date Provider Specialty Visit Type Primary Dx   09/05/2023 Eren Ruiz MD Primary Care Office Visit Thrombocytopenia [facility])

## 2023-10-06 DIAGNOSIS — I10 ESSENTIAL HYPERTENSION: ICD-10-CM

## 2023-10-06 RX ORDER — LOSARTAN POTASSIUM AND HYDROCHLOROTHIAZIDE 12.5; 1 MG/1; MG/1
1 TABLET ORAL DAILY
Qty: 30 TABLET | OUTPATIENT
Start: 2023-10-06

## 2023-10-06 RX ORDER — LOSARTAN POTASSIUM AND HYDROCHLOROTHIAZIDE 12.5; 1 MG/1; MG/1
1 TABLET ORAL DAILY
Qty: 90 TABLET | Refills: 1 | OUTPATIENT
Start: 2023-10-06

## 2023-10-06 RX ORDER — METOPROLOL SUCCINATE 100 MG/1
100 TABLET, EXTENDED RELEASE ORAL DAILY
Qty: 30 TABLET | Refills: 1 | Status: SHIPPED | OUTPATIENT
Start: 2023-10-06

## 2023-10-07 DIAGNOSIS — I10 ESSENTIAL HYPERTENSION: ICD-10-CM

## 2023-10-09 RX ORDER — LOSARTAN POTASSIUM AND HYDROCHLOROTHIAZIDE 12.5; 1 MG/1; MG/1
1 TABLET ORAL DAILY
Qty: 30 TABLET | Refills: 2 | Status: SHIPPED | OUTPATIENT
Start: 2023-10-09

## 2023-10-09 RX ORDER — AMLODIPINE BESYLATE 10 MG/1
10 TABLET ORAL DAILY
Qty: 90 TABLET | Refills: 1 | Status: SHIPPED | OUTPATIENT
Start: 2023-10-09

## 2023-10-09 NOTE — TELEPHONE ENCOUNTER
Medication:   Requested Prescriptions     Pending Prescriptions Disp Refills    losartan-hydroCHLOROthiazide (HYZAAR) 100-12.5 MG per tablet [Pharmacy Med Name: Suwanee Orris 100-12.5 MG TAB] 30 tablet      Sig: TAKE 1 TABLET BY MOUTH ONCE DAILY        Last Filled:      Patient Phone Number: [phone] (home)     Last appt: 9/5/2023   Next appt: 12/5/2023    Last OARRS:        No data to display

## 2023-10-09 NOTE — TELEPHONE ENCOUNTER
Medication:   Requested Prescriptions     Pending Prescriptions Disp Refills    amLODIPine (NORVASC) 10 MG tablet [Pharmacy Med Name: AMLODIPINE BESYLATE 10 MG TAB] 90 tablet 1     Sig: TAKE 1 TABLET BY MOUTH ONCE DAILY        Last Filled:      Patient Phone Number: [phone] (home)     Last appt: 9/5/2023   Next appt: 10/7/2023    Last OARRS:        No data to display

## 2023-10-29 DIAGNOSIS — I10 ESSENTIAL HYPERTENSION: ICD-10-CM

## 2023-10-30 NOTE — TELEPHONE ENCOUNTER
Medication:   Requested Prescriptions     Pending Prescriptions Disp Refills    losartan-hydroCHLOROthiazide (HYZAAR) 100-12.5 MG per tablet [Pharmacy Med Name: [place] 100-12.5 MG TAB] 90 tablet      Sig: TAKE 1 TABLET BY MOUTH EVERY DAY        Last Filled:      Patient Phone Number: [phone] (home)     Last appt: 9/5/2023   Next appt: 12/5/2023    Last OARRS:        No data to display

## 2023-11-02 RX ORDER — LOSARTAN POTASSIUM AND HYDROCHLOROTHIAZIDE 12.5; 1 MG/1; MG/1
1 TABLET ORAL DAILY
Qty: 90 TABLET | Refills: 3 | Status: SHIPPED | OUTPATIENT
Start: 2023-11-02

## 2023-12-04 DIAGNOSIS — I10 ESSENTIAL HYPERTENSION: ICD-10-CM

## 2023-12-04 RX ORDER — METOPROLOL SUCCINATE 100 MG/1
100 TABLET, EXTENDED RELEASE ORAL DAILY
Qty: 30 TABLET | Refills: 1 | Status: SHIPPED | OUTPATIENT
Start: 2023-12-04 | End: 2023-12-05 | Stop reason: SDUPTHER

## 2023-12-04 NOTE — TELEPHONE ENCOUNTER
Medication:   Requested Prescriptions     Pending Prescriptions Disp Refills    metoprolol succinate (TOPROL XL) 100 MG extended release tablet [Pharmacy Med Name: METOPROLOL SUCC  MG TAB] 30 tablet 1     Sig: TAKE 1 TABLET BY MOUTH EVERY DAY        Last Filled:      Patient Phone Number: [phone] (home)     Last appt: 9/5/2023   Next appt: 12/5/2023    Last OARRS:        No data to display

## 2023-12-05 ENCOUNTER — OFFICE VISIT (OUTPATIENT)
Dept: PRIMARY CARE CLINIC | Age: 53
End: 2023-12-05
Payer: COMMERCIAL

## 2023-12-05 VITALS
SYSTOLIC BLOOD PRESSURE: 132 MMHG | HEIGHT: 69 IN | HEART RATE: 93 BPM | WEIGHT: 213 LBS | TEMPERATURE: 98.8 F | OXYGEN SATURATION: 96 % | BODY MASS INDEX: 31.55 KG/M2 | DIASTOLIC BLOOD PRESSURE: 84 MMHG

## 2023-12-05 DIAGNOSIS — Z87.891 FORMER SMOKER: ICD-10-CM

## 2023-12-05 DIAGNOSIS — E55.9 VITAMIN D DEFICIENCY: ICD-10-CM

## 2023-12-05 DIAGNOSIS — I10 ESSENTIAL HYPERTENSION: ICD-10-CM

## 2023-12-05 DIAGNOSIS — Z12.5 PROSTATE CANCER SCREENING: ICD-10-CM

## 2023-12-05 DIAGNOSIS — R73.03 PRE-DIABETES: Primary | ICD-10-CM

## 2023-12-05 PROCEDURE — 99214 OFFICE O/P EST MOD 30 MIN: CPT | Performed by: INTERNAL MEDICINE

## 2023-12-05 PROCEDURE — 3079F DIAST BP 80-89 MM HG: CPT | Performed by: INTERNAL MEDICINE

## 2023-12-05 PROCEDURE — 3075F SYST BP GE 130 - 139MM HG: CPT | Performed by: INTERNAL MEDICINE

## 2023-12-05 RX ORDER — METOPROLOL SUCCINATE 100 MG/1
150 TABLET, EXTENDED RELEASE ORAL DAILY
Qty: 135 TABLET | Refills: 3 | Status: SHIPPED | OUTPATIENT
Start: 2023-12-05

## 2023-12-05 ASSESSMENT — ENCOUNTER SYMPTOMS
COUGH: 0
WHEEZING: 0
SHORTNESS OF BREATH: 0
SORE THROAT: 0
RHINORRHEA: 0
EYES NEGATIVE: 1
ALLERGIC/IMMUNOLOGIC NEGATIVE: 1

## 2023-12-05 NOTE — PROGRESS NOTES
Annual physical  2023    Humberto Gracia (:  1970) is a 47 y.o. male, here for a preventive medicine evaluation. Waist circumference 36  get covid booster at pharmacy  Patient Active Problem List   Diagnosis    Amputation of finger tip    Neutropenia (HCC)    Thrombocytopenia (HCC)    Effusion of bursa of right knee    Tobacco use disorder    Abdominal aortic aneurysm (AAA), 30-34 mm diameter (HCC)    Pre-diabetes    Mixed hyperlipidemia    Essential hypertension    Centrilobular emphysema (HCC)    Nodule of right lung    Encounter for smoking cessation counseling       Review of Systems   Constitutional:  Negative for fatigue and fever. HENT: Negative. Negative for postnasal drip, rhinorrhea and sore throat. Eyes: Negative. Respiratory:  Negative for cough, shortness of breath and wheezing. Smokes   Cardiovascular: Negative. Negative for chest pain and palpitations. Recent normal stress test.   Gastrointestinal:         Colonoscopy with Dr. Kolleen Kanner 2022 with polypectomy needs repeat 2027   Endocrine: Negative for polydipsia, polyphagia and polyuria. Prediabetes   Musculoskeletal: Negative. Negative for myalgias and neck pain. Skin: Negative. Negative for rash. Allergic/Immunologic: Negative. Neurological: Negative. Negative for weakness and headaches. Hematological: Negative. Psychiatric/Behavioral: Negative. Prior to Visit Medications    Medication Sig Taking?  Authorizing Provider   metoprolol succinate (TOPROL XL) 100 MG extended release tablet Take 1.5 tablets by mouth daily Yes Wilda Hansen MD   losartan-hydroCHLOROthiazide (HYZAAR) 100-12.5 MG per tablet TAKE 1 TABLET BY MOUTH EVERY DAY Yes Wilda Hansen MD   amLODIPine (NORVASC) 10 MG tablet TAKE 1 TABLET BY MOUTH ONCE DAILY Yes Wilda Hansen MD   nicotine polacrilex (NICORETTE) 2 MG gum Take 1 each by mouth as needed for Smoking cessation (urge to

## 2024-03-05 ENCOUNTER — OFFICE VISIT (OUTPATIENT)
Dept: PRIMARY CARE CLINIC | Age: 54
End: 2024-03-05

## 2024-03-05 VITALS
OXYGEN SATURATION: 97 % | DIASTOLIC BLOOD PRESSURE: 74 MMHG | BODY MASS INDEX: 30.66 KG/M2 | HEART RATE: 96 BPM | TEMPERATURE: 98.4 F | RESPIRATION RATE: 16 BRPM | SYSTOLIC BLOOD PRESSURE: 120 MMHG | HEIGHT: 69 IN | WEIGHT: 207 LBS

## 2024-03-05 DIAGNOSIS — Z11.59 NEED FOR HEPATITIS B SCREENING TEST: ICD-10-CM

## 2024-03-05 DIAGNOSIS — I10 ESSENTIAL HYPERTENSION: ICD-10-CM

## 2024-03-05 DIAGNOSIS — R73.03 PRE-DIABETES: ICD-10-CM

## 2024-03-05 DIAGNOSIS — R53.83 OTHER FATIGUE: ICD-10-CM

## 2024-03-05 DIAGNOSIS — E55.9 VITAMIN D DEFICIENCY: ICD-10-CM

## 2024-03-05 DIAGNOSIS — Z00.00 ANNUAL PHYSICAL EXAM: Primary | ICD-10-CM

## 2024-03-05 DIAGNOSIS — D70.9 NEUTROPENIA, UNSPECIFIED TYPE (HCC): ICD-10-CM

## 2024-03-05 DIAGNOSIS — E78.2 MIXED HYPERLIPIDEMIA: ICD-10-CM

## 2024-03-05 ASSESSMENT — PATIENT HEALTH QUESTIONNAIRE - PHQ9
SUM OF ALL RESPONSES TO PHQ QUESTIONS 1-9: 0
SUM OF ALL RESPONSES TO PHQ9 QUESTIONS 1 & 2: 0
2. FEELING DOWN, DEPRESSED OR HOPELESS: 0
SUM OF ALL RESPONSES TO PHQ QUESTIONS 1-9: 0
1. LITTLE INTEREST OR PLEASURE IN DOING THINGS: 0
SUM OF ALL RESPONSES TO PHQ QUESTIONS 1-9: 0
SUM OF ALL RESPONSES TO PHQ QUESTIONS 1-9: 0

## 2024-03-07 ASSESSMENT — ENCOUNTER SYMPTOMS
EYES NEGATIVE: 1
ALLERGIC/IMMUNOLOGIC NEGATIVE: 1
RHINORRHEA: 0
WHEEZING: 0
COUGH: 0
SORE THROAT: 0
SHORTNESS OF BREATH: 0

## 2024-03-07 NOTE — PROGRESS NOTES
have spent *** minutes reviewing previous notes, test results and face to face with the patient discussing the diagnosis and importance of compliance with the treatment plan as well as documenting on the day of the visit.      An electronic signature was used to authenticate this note.    --ADAM BERNAL MD   
  Cervical: No cervical adenopathy.   Skin:     General: Skin is warm and dry.      Findings: No lesion or rash.   Neurological:      General: No focal deficit present.      Mental Status: He is alert and oriented to person, place, and time.      Cranial Nerves: No cranial nerve deficit.      Sensory: No sensory deficit.      Motor: No weakness.      Gait: Gait normal.   Psychiatric:         Mood and Affect: Mood normal.         Behavior: Behavior normal.         Thought Content: Thought content normal.         Judgment: Judgment normal.             Latest Ref Rng & Units 9/5/2023    10:10 AM 6/5/2023    10:49 AM 3/6/2023    10:23 AM   LAB PRIMARY CARE   A1C See comment % 6.2  6.1     A1C POC See comment % 6.2  6.1     GLU random 70 - 99 mg/dL 115   79    CHOL 0 - 199 mg/dL 154      TRIG 0 - 150 mg/dL 63      HDL 40 - 60 mg/dL 53      LDL CALC <100 mg/dL 88       - 145 mmol/L 141   143    K 3.5 - 5.1 mmol/L 3.9   4.0    BUN 7 - 20 mg/dL 12   9    CR 0.9 - 1.3 mg/dL 0.7   0.8    CA 8.3 - 10.6 mg/dL 9.2   9.6    HGB 13.5 - 17.5 g/dL 14.2          Lab Results   Component Value Date/Time    CHOL 154 09/05/2023 10:10 AM    CHOL 151 03/03/2023 12:21 PM    CHOL 166 09/12/2022 08:34 AM    TRIG 63 09/05/2023 10:10 AM    TRIG 63 03/03/2023 12:21 PM    TRIG 60 09/12/2022 08:34 AM    HDL 53 09/05/2023 10:10 AM    HDL 54 03/03/2023 12:21 PM    HDL 54 09/12/2022 08:34 AM    LDLCALC 88 09/05/2023 10:10 AM    LDLCALC 84 03/03/2023 12:21 PM    LDLCALC 100 09/12/2022 08:34 AM    GLUCOSE 115 09/05/2023 10:10 AM    LABA1C 6.2 09/05/2023 10:10 AM    LABA1C 6.1 06/05/2023 10:49 AM    LABA1C 6.0 03/03/2023 12:21 PM       The 10-year ASCVD risk score (Cosme LUCAS, et al., 2019) is: 13.1%    Values used to calculate the score:      Age: 53 years      Sex: Male      Is Non- : Yes      Diabetic: No      Tobacco smoker: Yes      Systolic Blood Pressure: 120 mmHg      Is BP treated: Yes      HDL Cholesterol: 53

## 2024-03-11 DIAGNOSIS — E55.9 VITAMIN D DEFICIENCY: ICD-10-CM

## 2024-03-11 DIAGNOSIS — D70.9 NEUTROPENIA, UNSPECIFIED TYPE (HCC): ICD-10-CM

## 2024-03-11 DIAGNOSIS — Z11.59 NEED FOR HEPATITIS B SCREENING TEST: ICD-10-CM

## 2024-03-11 DIAGNOSIS — R73.03 PRE-DIABETES: ICD-10-CM

## 2024-03-11 DIAGNOSIS — R53.83 OTHER FATIGUE: ICD-10-CM

## 2024-03-11 DIAGNOSIS — E78.2 MIXED HYPERLIPIDEMIA: ICD-10-CM

## 2024-03-11 DIAGNOSIS — I10 ESSENTIAL HYPERTENSION: ICD-10-CM

## 2024-03-11 LAB
25(OH)D3 SERPL-MCNC: 28.2 NG/ML
ALBUMIN SERPL-MCNC: 4.1 G/DL (ref 3.4–5)
ALBUMIN/GLOB SERPL: 1.4 {RATIO} (ref 1.1–2.2)
ALP SERPL-CCNC: 76 U/L (ref 40–129)
ALT SERPL-CCNC: 32 U/L (ref 10–40)
ANION GAP SERPL CALCULATED.3IONS-SCNC: 11 MMOL/L (ref 3–16)
AST SERPL-CCNC: 26 U/L (ref 15–37)
BASOPHILS # BLD: 0.1 K/UL (ref 0–0.2)
BASOPHILS NFR BLD: 1.4 %
BILIRUB SERPL-MCNC: 0.6 MG/DL (ref 0–1)
BUN SERPL-MCNC: 8 MG/DL (ref 7–20)
CALCIUM SERPL-MCNC: 9.3 MG/DL (ref 8.3–10.6)
CHLORIDE SERPL-SCNC: 105 MMOL/L (ref 99–110)
CHOLEST SERPL-MCNC: 127 MG/DL (ref 0–199)
CO2 SERPL-SCNC: 28 MMOL/L (ref 21–32)
CREAT SERPL-MCNC: 0.7 MG/DL (ref 0.9–1.3)
DEPRECATED RDW RBC AUTO: 14.2 % (ref 12.4–15.4)
EOSINOPHIL # BLD: 0.2 K/UL (ref 0–0.6)
EOSINOPHIL NFR BLD: 3.8 %
FOLATE SERPL-MCNC: 18.12 NG/ML (ref 4.78–24.2)
GFR SERPLBLD CREATININE-BSD FMLA CKD-EPI: >60 ML/MIN/{1.73_M2}
GLUCOSE SERPL-MCNC: 84 MG/DL (ref 70–99)
HBV SURFACE AB SERPL IA-ACNC: <3.5 MIU/ML
HCT VFR BLD AUTO: 42.5 % (ref 40.5–52.5)
HDLC SERPL-MCNC: 48 MG/DL (ref 40–60)
HGB BLD-MCNC: 14.8 G/DL (ref 13.5–17.5)
LDLC SERPL CALC-MCNC: 71 MG/DL
LYMPHOCYTES # BLD: 1.7 K/UL (ref 1–5.1)
LYMPHOCYTES NFR BLD: 41.1 %
MCH RBC QN AUTO: 31.8 PG (ref 26–34)
MCHC RBC AUTO-ENTMCNC: 35 G/DL (ref 31–36)
MCV RBC AUTO: 90.9 FL (ref 80–100)
MONOCYTES # BLD: 0.4 K/UL (ref 0–1.3)
MONOCYTES NFR BLD: 9.9 %
NEUTROPHILS # BLD: 1.8 K/UL (ref 1.7–7.7)
NEUTROPHILS NFR BLD: 43.8 %
PLATELET # BLD AUTO: 200 K/UL (ref 135–450)
PMV BLD AUTO: 9.1 FL (ref 5–10.5)
POTASSIUM SERPL-SCNC: 4.3 MMOL/L (ref 3.5–5.1)
PROT SERPL-MCNC: 7.1 G/DL (ref 6.4–8.2)
RBC # BLD AUTO: 4.67 M/UL (ref 4.2–5.9)
SODIUM SERPL-SCNC: 144 MMOL/L (ref 136–145)
TRIGL SERPL-MCNC: 41 MG/DL (ref 0–150)
TSH SERPL DL<=0.005 MIU/L-ACNC: 1.68 UIU/ML (ref 0.27–4.2)
VIT B12 SERPL-MCNC: 394 PG/ML (ref 211–911)
VLDLC SERPL CALC-MCNC: 8 MG/DL
WBC # BLD AUTO: 4 K/UL (ref 4–11)

## 2024-03-12 ENCOUNTER — OFFICE VISIT (OUTPATIENT)
Dept: SLEEP MEDICINE | Age: 54
End: 2024-03-12
Payer: COMMERCIAL

## 2024-03-12 VITALS
TEMPERATURE: 98.2 F | SYSTOLIC BLOOD PRESSURE: 135 MMHG | HEART RATE: 74 BPM | WEIGHT: 208.8 LBS | OXYGEN SATURATION: 100 % | DIASTOLIC BLOOD PRESSURE: 82 MMHG | HEIGHT: 69 IN | BODY MASS INDEX: 30.93 KG/M2 | RESPIRATION RATE: 18 BRPM

## 2024-03-12 DIAGNOSIS — J44.9 OVERLAP SYNDROME OF OBSTRUCTIVE SLEEP APNEA AND CHRONIC OBSTRUCTIVE PULMONARY DISEASE (HCC): ICD-10-CM

## 2024-03-12 DIAGNOSIS — E55.9 VITAMIN D DEFICIENCY: Primary | ICD-10-CM

## 2024-03-12 DIAGNOSIS — E66.9 OBESITY (BMI 30.0-34.9): ICD-10-CM

## 2024-03-12 DIAGNOSIS — E78.2 MIXED HYPERLIPIDEMIA: ICD-10-CM

## 2024-03-12 DIAGNOSIS — G47.33 OVERLAP SYNDROME OF OBSTRUCTIVE SLEEP APNEA AND CHRONIC OBSTRUCTIVE PULMONARY DISEASE (HCC): ICD-10-CM

## 2024-03-12 DIAGNOSIS — G47.33 OSA (OBSTRUCTIVE SLEEP APNEA): Primary | ICD-10-CM

## 2024-03-12 DIAGNOSIS — I10 ESSENTIAL HYPERTENSION: ICD-10-CM

## 2024-03-12 DIAGNOSIS — R53.83 FATIGUE, UNSPECIFIED TYPE: ICD-10-CM

## 2024-03-12 LAB
EST. AVERAGE GLUCOSE BLD GHB EST-MCNC: 128.4 MG/DL
FRUCTOSAMINE SERPL-SCNC: 282 UMOL/L (ref 205–285)
HBA1C MFR BLD: 6.1 %
HBV CORE AB SERPL QL IA: NEGATIVE

## 2024-03-12 PROCEDURE — 99204 OFFICE O/P NEW MOD 45 MIN: CPT | Performed by: PSYCHIATRY & NEUROLOGY

## 2024-03-12 PROCEDURE — 3075F SYST BP GE 130 - 139MM HG: CPT | Performed by: PSYCHIATRY & NEUROLOGY

## 2024-03-12 PROCEDURE — 3079F DIAST BP 80-89 MM HG: CPT | Performed by: PSYCHIATRY & NEUROLOGY

## 2024-03-12 RX ORDER — ATORVASTATIN CALCIUM 20 MG/1
20 TABLET, FILM COATED ORAL DAILY
Qty: 90 TABLET | Refills: 3 | Status: SHIPPED | OUTPATIENT
Start: 2024-03-12

## 2024-03-12 RX ORDER — ERGOCALCIFEROL 1.25 MG/1
50000 CAPSULE ORAL WEEKLY
Qty: 12 CAPSULE | Refills: 3 | Status: SHIPPED | OUTPATIENT
Start: 2024-03-12

## 2024-03-12 ASSESSMENT — SLEEP AND FATIGUE QUESTIONNAIRES
HOW LIKELY ARE YOU TO NOD OFF OR FALL ASLEEP WHILE SITTING AND READING: 0
HOW LIKELY ARE YOU TO NOD OFF OR FALL ASLEEP WHILE WATCHING TV: 2
ESS TOTAL SCORE: 6
HOW LIKELY ARE YOU TO NOD OFF OR FALL ASLEEP IN A CAR, WHILE STOPPED FOR A FEW MINUTES IN TRAFFIC: 0
HOW LIKELY ARE YOU TO NOD OFF OR FALL ASLEEP WHILE LYING DOWN TO REST IN THE AFTERNOON WHEN CIRCUMSTANCES PERMIT: 3
HOW LIKELY ARE YOU TO NOD OFF OR FALL ASLEEP WHILE SITTING INACTIVE IN A PUBLIC PLACE: 1
NECK CIRCUMFERENCE (INCHES): 16.5
HOW LIKELY ARE YOU TO NOD OFF OR FALL ASLEEP WHILE SITTING AND TALKING TO SOMEONE: 0
HOW LIKELY ARE YOU TO NOD OFF OR FALL ASLEEP WHEN YOU ARE A PASSENGER IN A CAR FOR AN HOUR WITHOUT A BREAK: 0
HOW LIKELY ARE YOU TO NOD OFF OR FALL ASLEEP WHILE SITTING QUIETLY AFTER LUNCH WITHOUT ALCOHOL: 0

## 2024-03-12 ASSESSMENT — ENCOUNTER SYMPTOMS
ALLERGIC/IMMUNOLOGIC NEGATIVE: 1
EYE DISCHARGE: 1
GASTROINTESTINAL NEGATIVE: 1
COUGH: 1

## 2024-03-12 NOTE — PROGRESS NOTES
MD CHEYENNE Dickson Board Certified in Sleep Medicine  [facility]  Board Certified in Neurology [facility]  [address]   [address]  [place]  [address]  P- [phone]   [facility]   [address]  [address]   [place], [place] [address]           [facility] SPECIAL[facility] CARE [facility] SLEEP MEDICINE WEST  [address] [address] [phone]    Subjective:     Patient ID: Carroll Mejia is a 53 y.o. male.    Chief Complaint   Patient presents with    New Patient    Daytime Sleepiness    Fatigue       HPI:        Carroll Mejia is a 53 y.o. male referred by Dr. Conti for a sleep evaluation. He complains of snoring, snorting, tossing and turning, excessive daytime sleepiness, feels sleepy during the day, fatigue but he denies periods of not breathing, knees buckling with laughing, completely or partially paralyzed while falling asleep or waking up, noisy environment, uncomfortable room temperature, uncomfortable bedding.  Symptoms began several years ago, gradually worsening since that time.   The patient's bed-partner confirmed the snoring without the stopped breathing at night  SLEEP SCHEDULE: Goes to bed around 10 PM in the weekdays and 10 PM in the weekends. It usually takes the patient 30-60 minutes to fall asleep. The patient gets up 1 per night to go to the bathroom. The Patient finally gets up at 5 AM during the weekdays and 5 AM in the weekends. patient wakes up with dry mouth and sometimes morning headache.. the headache usually dull headache.   The patient has restless sleep with frequent arousals in addition to the Patient has significant daytime sleepiness. The Patient scored [place] Sleepiness Score: 6 on [place] Sleepiness Scale ( more than 10 is indicative of daytime sleepiness)and 9 in fatigue scale ( more than 36 is indicative of daytime fatigue). The patient

## 2024-03-12 NOTE — PATIENT INSTRUCTIONS
Orders Placed This Encounter   Procedures    Baseline Diagnostic Sleep Study     Standing Status:   Future     Standing Expiration Date:   3/12/2025     Order Specific Question:   Adult or Pediatric     Answer:   Adult Study (>7 Years)     Order Specific Question:   Location For Sleep Study     Answer:   [place]     Order Specific Question:   Select Sleep Lab Location     Answer:   [facility]    Sleep Study with PAP Titration     Standing Status:   Future     Standing Expiration Date:   3/12/2025     Order Specific Question:   Sleep Study Titration Type     Answer:   CPAP     Order Specific Question:   Location For Sleep Study     Answer:   [place]     Order Specific Question:   Select Sleep Lab Location     Answer:   [facility]

## 2024-03-12 NOTE — RESULT ENCOUNTER NOTE
Normal kidney blood test.  Normal liver blood test.  Vitamin D is improving but still low.  I sent in a vitamin D prescription to take once a week.  Good job in controlling the prediabetes continue to exercise and avoid breads and pastas eat a lot of fresh fruits and vegetables.  Stay away from oily greasy meats and eat baked or boiled.  The cholesterol is much better but with the new guidelines we want it to be down to 50.  Take 2 of your atorvastatin 10 mg daily and when they are gone start on the new 20 mg prescription dose.  You have not had the hepatitis B vaccine.  Await the hepatitis B core antibody.  If this is negative will have you call to arrange to get the hepatitis B vaccine.  Order the super juice vitamin tablets from [employer] as a good multivitamin.

## 2024-03-13 PROBLEM — Z78.9 NOT IMMUNE TO HEPATITIS B VIRUS: Status: ACTIVE | Noted: 2024-03-13

## 2024-03-13 NOTE — RESULT ENCOUNTER NOTE
Your blood test shows that you are not immune to hepatitis B and you have never had the hepatitis B virus.  Call the office to arrange for the hepatitis B vaccine so you will be protected.  You are at low risk since she could only get this through blood transfusions and sometimes through sexual contact.  All the children have been vaccinated for the past 30 years and all healthcare workers have been vaccinated and now it is recommended that everyone should be vaccinated to protect them from hepatitis B.

## 2024-03-22 ENCOUNTER — TELEPHONE (OUTPATIENT)
Dept: PRIMARY CARE CLINIC | Age: 54
End: 2024-03-22

## 2024-03-28 ENCOUNTER — HOSPITAL ENCOUNTER (OUTPATIENT)
Dept: SLEEP CENTER | Age: 54
Discharge: HOME | End: 2024-03-28
Attending: PSYCHIATRY & NEUROLOGY
Payer: COMMERCIAL

## 2024-03-28 DIAGNOSIS — J44.9 OVERLAP SYNDROME OF OBSTRUCTIVE SLEEP APNEA AND CHRONIC OBSTRUCTIVE PULMONARY DISEASE (HCC): ICD-10-CM

## 2024-03-28 DIAGNOSIS — G47.33 OSA (OBSTRUCTIVE SLEEP APNEA): ICD-10-CM

## 2024-03-28 DIAGNOSIS — G47.33 OVERLAP SYNDROME OF OBSTRUCTIVE SLEEP APNEA AND CHRONIC OBSTRUCTIVE PULMONARY DISEASE (HCC): ICD-10-CM

## 2024-03-28 PROCEDURE — 95810 POLYSOM 6/> YRS 4/> PARAM: CPT

## 2024-04-02 ENCOUNTER — TELEPHONE (OUTPATIENT)
Dept: PULMONOLOGY | Age: 54
End: 2024-04-02

## 2024-04-02 NOTE — TELEPHONE ENCOUNTER
PSG Sleep study showed moderate RONNY (on a scale of mild, moderate and severe).  AHI was 19.6  per hr. (Average times per hr breathing was obstructed).  O2 Desaturations to 84% (lowest o2)   Dr Recommends:    Follow up with the patient's sleep physician to discuss results  A trial of CPAP titration is recommended with supplemental oxygen per protocol if needed.  Avoid sedatives, alcohol and caffeinated drinks at bedtime.  Avoid driving while sleepy.       The patient has been notified of this information and all questions answered.  Transferred to sleep lab

## 2024-04-19 DIAGNOSIS — I10 ESSENTIAL HYPERTENSION: ICD-10-CM

## 2024-04-19 RX ORDER — METOPROLOL SUCCINATE 100 MG/1
150 TABLET, EXTENDED RELEASE ORAL DAILY
Qty: 135 TABLET | Refills: 3 | Status: SHIPPED | OUTPATIENT
Start: 2024-04-19

## 2024-04-19 RX ORDER — METOPROLOL SUCCINATE 100 MG/1
100 TABLET, EXTENDED RELEASE ORAL DAILY
Qty: 30 TABLET | Refills: 1 | OUTPATIENT
Start: 2024-04-19

## 2024-04-19 NOTE — TELEPHONE ENCOUNTER
Medication:   Requested Prescriptions     Pending Prescriptions Disp Refills    metoprolol succinate (TOPROL XL) 100 MG extended release tablet [Pharmacy Med Name: METOPROLOL SUCC  MG TAB] 30 tablet 1     Sig: TAKE 1 TABLET BY MOUTH EVERY DAY        Last Filled:      Patient Phone Number: [phone] (home)     Last appt: 3/5/2024   Next appt: 6/10/2024    Last OARRS:        No data to display

## 2024-04-29 ENCOUNTER — HOSPITAL ENCOUNTER (OUTPATIENT)
Dept: SLEEP CENTER | Age: 54
Discharge: HOME | End: 2024-04-29
Payer: COMMERCIAL

## 2024-04-29 PROCEDURE — 95811 POLYSOM 6/>YRS CPAP 4/> PARM: CPT

## 2024-04-30 PROBLEM — G47.33 OSA (OBSTRUCTIVE SLEEP APNEA): Status: ACTIVE | Noted: 2024-04-30

## 2024-04-30 PROCEDURE — 95811 POLYSOM 6/>YRS CPAP 4/> PARM: CPT | Performed by: PSYCHIATRY & NEUROLOGY

## 2024-04-30 NOTE — PROGRESS NOTES
Carroll Mejia         : 1970  [] [facility]     [] [facility]      [] [facility]     [][facility]    [] [facility]  [] [facility]  [] [facility]   [] [facility] [] Other:  Diagnosis: [x] RONNY (G47.33) [] CSA (G47.31) [] Apnea (G47.30)   Length of Need: [] 12 Months [x] 99 Months [] Other:    Machine (CAIO!):  [x] ResMed AirSense     Auto [] Other:     [x]  CPAP () [] Bilevel ()   Mode: [x] Auto [] Spontaneous    Mode: [] Auto [] Spontaneous             10 cm                 Comfort Settings:     If the order for CPAP  - Ramp Pressure: 5 cmH2O                                        - Ramp time: 15 min                                     -  Flex/EPR - 3 full time       Humidifier: [x] Heated ()        [x] Water chamber replacement ()/ 1 per 6 months        Mask:  Please always start with the mask the patient used during the titraion   [x] Nasal () /1 per 3 months    [x] Patient choice -Size and fit mask    [] Dispense: large Airfit P10 nasal pillows     [x] Headgear () / 1 per 6 months        [x] Replacement Nasal Pillows ()/2 per month         Tubing: [x] Heated ()/1 per 3 months    [] Standard ()/1 per 3 months [] Other:           Filters: [x] Non-disposable ()/1 per 6 months     [x] Ultra-Fine, Disposable ()/2 per month        Miscellaneous: [x] Chin Strap ()/ 1 per 6 months [] O2 bleed-in:       LPM   [] Oximetry on CPAP/Bilevel []  Other:          Start Order Date: 24    MEDICAL JUSTIFICATION:  I, the undersigned, certify that the above prescribed supplies are medically necessary for this patient’s wellbeing.  In my opinion, the supplies are both reasonable and necessary in reference to accepted standards of medicalpractice in treatment of this patient’s condition.    Erin Gonzalez MD      NPI: [id number]       Order Signed Date: 24    Electronically signed by Erin Gonzalez MD on 2024 at 10:38 AM

## 2024-05-01 ENCOUNTER — TELEPHONE (OUTPATIENT)
Dept: PULMONOLOGY | Age: 54
End: 2024-05-01

## 2024-05-01 NOTE — TELEPHONE ENCOUNTER
Titration study shows adequate control of the events at a CPAP pressure of 10 cm.      DME:     Patient will need appointment 31-90 days after starting new machine      LMOM to call

## 2024-05-03 ENCOUNTER — PATIENT MESSAGE (OUTPATIENT)
Dept: PULMONOLOGY | Age: 54
End: 2024-05-03

## 2024-05-03 NOTE — TELEPHONE ENCOUNTER
Carroll returned our call. Advised him to contact his insurance company and see who he can get a CPAP from and call us back.

## 2024-05-03 NOTE — TELEPHONE ENCOUNTER
The patient has been notified of this information and all questions answered.   Will call back with DME    DME list sent to Patient's my chart

## 2024-05-17 DIAGNOSIS — I10 ESSENTIAL HYPERTENSION: ICD-10-CM

## 2024-05-20 ENCOUNTER — HOSPITAL ENCOUNTER (OUTPATIENT)
Dept: CT IMAGING | Age: 54
Discharge: HOME | End: 2024-05-20
Attending: INTERNAL MEDICINE
Payer: COMMERCIAL

## 2024-05-20 DIAGNOSIS — R91.1 LUNG NODULE: ICD-10-CM

## 2024-05-20 PROCEDURE — 71250 CT THORAX DX C-: CPT

## 2024-05-20 RX ORDER — AMLODIPINE BESYLATE 10 MG/1
10 TABLET ORAL DAILY
Qty: 90 TABLET | Refills: 3 | Status: SHIPPED | OUTPATIENT
Start: 2024-05-20

## 2024-05-20 NOTE — TELEPHONE ENCOUNTER
Medication:   Requested Prescriptions     Pending Prescriptions Disp Refills    amLODIPine (NORVASC) 10 MG tablet [Pharmacy Med Name: AMLODIPINE BESYLATE 10 MG TAB] 90 tablet 1     Sig: TAKE 1 TABLET BY MOUTH EVERY DAY        Last Filled:      Patient Phone Number: [phone] (home)     Last appt: 3/5/2024   Next appt: 6/10/2024    Last OARRS:        No data to display

## 2024-05-28 ENCOUNTER — OFFICE VISIT (OUTPATIENT)
Dept: PULMONOLOGY | Age: 54
End: 2024-05-28
Payer: COMMERCIAL

## 2024-05-28 VITALS
TEMPERATURE: 98 F | BODY MASS INDEX: 30.9 KG/M2 | HEIGHT: 69 IN | RESPIRATION RATE: 18 BRPM | SYSTOLIC BLOOD PRESSURE: 140 MMHG | HEART RATE: 84 BPM | WEIGHT: 208.6 LBS | OXYGEN SATURATION: 97 % | DIASTOLIC BLOOD PRESSURE: 70 MMHG

## 2024-05-28 DIAGNOSIS — R91.1 LUNG NODULE: Primary | ICD-10-CM

## 2024-05-28 DIAGNOSIS — Z72.0 TOBACCO ABUSE: ICD-10-CM

## 2024-05-28 PROCEDURE — 3077F SYST BP >= 140 MM HG: CPT | Performed by: INTERNAL MEDICINE

## 2024-05-28 PROCEDURE — 3078F DIAST BP <80 MM HG: CPT | Performed by: INTERNAL MEDICINE

## 2024-05-28 PROCEDURE — 99214 OFFICE O/P EST MOD 30 MIN: CPT | Performed by: INTERNAL MEDICINE

## 2024-05-28 NOTE — PROGRESS NOTES
Pulmonary Progress note           REASON FOR CONSULTATION:  Chief Complaint   Patient presents with    Follow-up     Lung nodule        Consult at request of Jeny Conti MD     PCP: Jeny Conti MD        Assessment and Plan:   Diagnosis Orders   1. Lung nodule  CT CHEST WO CONTRAST      2. Tobacco abuse                Plan:  12-month follow-up CT scan to monitor his lung nodule  He was strongly advised to quit smoking more than 3 minutes of counseling was given.            HISTORY OF PRESENT ILLNESS: Carroll Mejia is very pleasant 54 y.o. year old -American gentleman with medical history stated below significant for chronic active smoker with more than 30 PY history of smoking, was referred to us for 6 mm lung nodule and emphysema.    CT scan which I personally reviewed showed 6 mm right upper lobe medial lung nodule additionally there is bibasilar nodular-like scarring especially on the left.  Emphysema noted.        He does not complain of significant shortness of breath.  He is trying to quit smoking.  He is not currently on any inhalers.      He works as a   Stable lung nodule    Past Medical History:   Diagnosis Date    Alcohol abuse     Centrilobular emphysema (HCC) 5/5/2023    Depression     Diverticulitis     Hyperlipidemia     Hypertension        Past Surgical History:   Procedure Laterality Date    COLECTOMY      HAND SURGERY Right 9/2/14    Middle finger revision amputation       family history includes Coronary Art Dis in his father; Diabetes in his mother.      SOCIAL HISTORY:   reports that he has been smoking cigarettes. He has a 5.0 pack-year smoking history. He has been exposed to tobacco smoke. He has never used smokeless tobacco.      ALLERGIES:  Patient has No Known Allergies.    REVIEW OF SYSTEMS:  Constitutional: Negative for fever, no wt loss, no

## 2024-06-10 ENCOUNTER — OFFICE VISIT (OUTPATIENT)
Dept: PRIMARY CARE CLINIC | Age: 54
End: 2024-06-10
Payer: COMMERCIAL

## 2024-06-10 VITALS
TEMPERATURE: 98.4 F | OXYGEN SATURATION: 98 % | RESPIRATION RATE: 16 BRPM | SYSTOLIC BLOOD PRESSURE: 118 MMHG | HEIGHT: 69 IN | DIASTOLIC BLOOD PRESSURE: 80 MMHG | WEIGHT: 207.4 LBS | BODY MASS INDEX: 30.72 KG/M2 | HEART RATE: 74 BPM

## 2024-06-10 DIAGNOSIS — J98.01 COUGH DUE TO BRONCHOSPASM: ICD-10-CM

## 2024-06-10 DIAGNOSIS — Z86.010 HISTORY OF ADENOMATOUS POLYP OF COLON: Primary | ICD-10-CM

## 2024-06-10 DIAGNOSIS — Z71.6 ENCOUNTER FOR SMOKING CESSATION COUNSELING: ICD-10-CM

## 2024-06-10 DIAGNOSIS — I10 ESSENTIAL HYPERTENSION: ICD-10-CM

## 2024-06-10 PROBLEM — Z86.0101 HISTORY OF ADENOMATOUS POLYP OF COLON: Status: ACTIVE | Noted: 2024-06-10

## 2024-06-10 PROCEDURE — 3079F DIAST BP 80-89 MM HG: CPT | Performed by: INTERNAL MEDICINE

## 2024-06-10 PROCEDURE — 3074F SYST BP LT 130 MM HG: CPT | Performed by: INTERNAL MEDICINE

## 2024-06-10 PROCEDURE — 99214 OFFICE O/P EST MOD 30 MIN: CPT | Performed by: INTERNAL MEDICINE

## 2024-06-10 RX ORDER — LOSARTAN POTASSIUM AND HYDROCHLOROTHIAZIDE 12.5; 1 MG/1; MG/1
1 TABLET ORAL DAILY
Qty: 90 TABLET | Refills: 3 | Status: SHIPPED | OUTPATIENT
Start: 2024-06-10

## 2024-06-10 RX ORDER — FLUTICASONE PROPIONATE AND SALMETEROL 100; 50 UG/1; UG/1
1 POWDER RESPIRATORY (INHALATION) EVERY 12 HOURS
Qty: 1 EACH | Refills: 5 | Status: SHIPPED | OUTPATIENT
Start: 2024-06-10

## 2024-06-10 SDOH — ECONOMIC STABILITY: INCOME INSECURITY: HOW HARD IS IT FOR YOU TO PAY FOR THE VERY BASICS LIKE FOOD, HOUSING, MEDICAL CARE, AND HEATING?: NOT HARD AT ALL

## 2024-06-10 SDOH — ECONOMIC STABILITY: FOOD INSECURITY: WITHIN THE PAST 12 MONTHS, YOU WORRIED THAT YOUR FOOD WOULD RUN OUT BEFORE YOU GOT MONEY TO BUY MORE.: NEVER TRUE

## 2024-06-10 SDOH — ECONOMIC STABILITY: FOOD INSECURITY: WITHIN THE PAST 12 MONTHS, THE FOOD YOU BOUGHT JUST DIDN'T LAST AND YOU DIDN'T HAVE MONEY TO GET MORE.: NEVER TRUE

## 2024-06-10 ASSESSMENT — ENCOUNTER SYMPTOMS
WHEEZING: 0
RHINORRHEA: 0
COUGH: 0
ALLERGIC/IMMUNOLOGIC NEGATIVE: 1
SHORTNESS OF BREATH: 0
SORE THROAT: 0
EYES NEGATIVE: 1

## 2024-06-10 NOTE — PROGRESS NOTES
12/2/2027   Endocrine: Negative for polydipsia, polyphagia and polyuria.        Prediabetes   Musculoskeletal: Negative.  Negative for myalgias and neck pain.   Skin: Negative.  Negative for rash.   Allergic/Immunologic: Negative.    Neurological: Negative.  Negative for weakness and headaches.   Hematological: Negative.    Psychiatric/Behavioral: Negative.            Objective   Physical Exam  Constitutional:       General: He is not in acute distress.     Appearance: Normal appearance. He is not ill-appearing, toxic-appearing or diaphoretic.   Eyes:      Extraocular Movements: Extraocular movements intact.      Conjunctiva/sclera: Conjunctivae normal.      Pupils: Pupils are equal, round, and reactive to light.   Neck:      Vascular: No carotid bruit.   Cardiovascular:      Pulses:           Femoral pulses are 2+ on the right side and 2+ on the left side.       Dorsalis pedis pulses are 1+ on the right side and 1+ on the left side.      Heart sounds: Normal heart sounds.      Comments: Good cap refill of feet.  , loss of hair on legs , but has chest hair, concern for circulation  Pulmonary:      Effort: Pulmonary effort is normal.      Breath sounds: Normal breath sounds.   Abdominal:      General: Abdomen is flat. Bowel sounds are normal. There is no distension.      Palpations: Abdomen is soft. There is no mass.      Tenderness: There is no abdominal tenderness. There is no right CVA tenderness, left CVA tenderness, guarding or rebound.      Hernia: No hernia is present.      Comments: No bruit   Musculoskeletal:         General: Normal range of motion.      Cervical back: Normal range of motion and neck supple. No rigidity or tenderness.   Lymphadenopathy:      Cervical: No cervical adenopathy.   Skin:     General: Skin is warm and dry.      Findings: No lesion or rash.   Neurological:      General: No focal deficit present.      Mental Status: He is alert and oriented to person, place, and time.      Cranial

## 2024-06-10 NOTE — PATIENT INSTRUCTIONS
[facility] Financial Resources*  (Call [employer]/211 if need more resources.)      [employer] 211   Speak to a trained professional 24/7 who can connect you to essential community services including food, clothing, transportation, housing, utilities, employment services, childcare, and baby supplies. 211 serves nationwide.   [employer] [facility] [place] for resources in [place], [facility], [place] and [facility] in [place]; [place], [place], [place], and [facility] in [place].   [facility].org/resources for resources in [place], [place], [place], [place], [place], [place], [place], [place], [facility], [place], [place], and [facility] in [place].     [employer] Financial Assistance  What they offer: Financial assistance programs that are designed to assist you in finding resources that may help pay your hospital bill. Please click on the links below to learn more about the financial assistance programs available within our regions.  Phone Number: [phone]  How to apply for the [facility] Financial Assistance Program:       Option 1: To apply for financial assistance, a patient (or their family or other provider) should fill out the Financial Assistance Application. Copies of the Financial Assistance Application and the FAP may be obtained for free by calling the [facility] Customer Service department at 112-685-1883   Option 2: The Financial Assistance Application and policy may be obtained for free by downloading a copy from the [employer] website:  https://www.[employer]/patient-resources/financial-assistance  [place] Health Care Assurance Program  What they offer:  Patients who need hospital care, but are unable to pay for it, may be eligible for free or reduced fee care at [facility] through the Hospital Care Assurance Program (HCAP). Applications for HCAP are accepted by the hospital where care was received, and patients seeking HCAP assistance should contact their hospital’s billing department for

## 2024-06-11 ASSESSMENT — ENCOUNTER SYMPTOMS
BLURRED VISION: 0
ORTHOPNEA: 0

## 2024-07-22 ENCOUNTER — OFFICE VISIT (OUTPATIENT)
Dept: SLEEP MEDICINE | Age: 54
End: 2024-07-22
Payer: COMMERCIAL

## 2024-07-22 VITALS
TEMPERATURE: 98.2 F | BODY MASS INDEX: 30.6 KG/M2 | HEART RATE: 70 BPM | WEIGHT: 206.6 LBS | RESPIRATION RATE: 18 BRPM | SYSTOLIC BLOOD PRESSURE: 100 MMHG | OXYGEN SATURATION: 98 % | HEIGHT: 69 IN | DIASTOLIC BLOOD PRESSURE: 60 MMHG

## 2024-07-22 DIAGNOSIS — G47.33 OSA ON CPAP: Primary | ICD-10-CM

## 2024-07-22 DIAGNOSIS — I10 ESSENTIAL HYPERTENSION: ICD-10-CM

## 2024-07-22 DIAGNOSIS — Z99.89 DEPENDENCE ON OTHER ENABLING MACHINES AND DEVICES: ICD-10-CM

## 2024-07-22 DIAGNOSIS — J43.2 CENTRILOBULAR EMPHYSEMA (HCC): ICD-10-CM

## 2024-07-22 PROCEDURE — 3078F DIAST BP <80 MM HG: CPT | Performed by: PSYCHIATRY & NEUROLOGY

## 2024-07-22 PROCEDURE — 3074F SYST BP LT 130 MM HG: CPT | Performed by: PSYCHIATRY & NEUROLOGY

## 2024-07-22 PROCEDURE — 99214 OFFICE O/P EST MOD 30 MIN: CPT | Performed by: PSYCHIATRY & NEUROLOGY

## 2024-07-22 NOTE — PROGRESS NOTES
MD CHEYENNE Gonzalez Board Certified in Sleep Medicine  Certified in Behavioral Sleep Medicine  Board Certified in Neurology [facility]  [address]   [address]  [place]  [address]  [phone]   [facility]  [address]  [address]  [place], [place] [address]                      [facility] SPECIALTY CARE [facility] SLEEP MEDICINE WEST  [address] [address] [phone]    Subjective:     Patient ID: Carroll Mejia is a 54 y.o. male.    Chief Complaint   Patient presents with    Follow-up    Sleep Apnea       HPI:        Carroll Mejia is a 54 y.o. male was seen today as a follow for obstructive sleep apnea. The patient underwent comprehensive polysomnogram on 03/28/2024, the overnight registration revealed moderate obstructive sleep apnea with apnea hypopnea index of 19.6/hr with lowest O2 saturation of 84%, patient spent about 9.0 minutes below 90% (weight was 208 pounds). Subsequently, the patient underwent successful PAP titration on 04/29/2024, the lowest O2 saturation while on PAP was 93%.  Patient is using the PAP machine about 98% of the time, more than 4 hours a nightabout  74 %, in total average of 4:50 hours a night in last 42 days.  Currently on PAP at 10 cm (), the AHI is only 1.8 events per hour at this pressure.  Patient improved regarding daytime sleepiness and fatigue, wakes up refreshed in the morning.  The Patient scored [place] Sleepiness Score: 8 on [place] Sleepiness Scale ( more than 10 is indicative of daytime sleepiness)   Patient has no problem with PAP pressure or mask, uses nasal mask wisp.    BP, and Emphysema are stable. Has not gained weight pounds since last visit.   DOT/CDL - N/A      Previous Report(s)Reviewed: historical medical records         Social History     Socioeconomic History    Marital status: Legally      Spouse name: Not on file    Number

## 2024-09-10 ENCOUNTER — OFFICE VISIT (OUTPATIENT)
Dept: PRIMARY CARE CLINIC | Age: 54
End: 2024-09-10
Payer: COMMERCIAL

## 2024-09-10 VITALS
WEIGHT: 206.8 LBS | TEMPERATURE: 98.4 F | BODY MASS INDEX: 30.63 KG/M2 | DIASTOLIC BLOOD PRESSURE: 82 MMHG | HEIGHT: 69 IN | HEART RATE: 76 BPM | SYSTOLIC BLOOD PRESSURE: 128 MMHG | RESPIRATION RATE: 16 BRPM | OXYGEN SATURATION: 98 %

## 2024-09-10 DIAGNOSIS — Z28.39 IMMUNIZATION DEFICIENCY: ICD-10-CM

## 2024-09-10 DIAGNOSIS — R73.03 PRE-DIABETES: Primary | ICD-10-CM

## 2024-09-10 DIAGNOSIS — Z12.5 PROSTATE CANCER SCREENING: ICD-10-CM

## 2024-09-10 DIAGNOSIS — E78.2 MIXED HYPERLIPIDEMIA: ICD-10-CM

## 2024-09-10 DIAGNOSIS — J43.2 CENTRILOBULAR EMPHYSEMA (HCC): ICD-10-CM

## 2024-09-10 DIAGNOSIS — I10 ESSENTIAL HYPERTENSION: ICD-10-CM

## 2024-09-10 DIAGNOSIS — G47.33 OSA (OBSTRUCTIVE SLEEP APNEA): ICD-10-CM

## 2024-09-10 LAB
ALBUMIN SERPL-MCNC: 4.3 G/DL (ref 3.4–5)
ANION GAP SERPL CALCULATED.3IONS-SCNC: 12 MMOL/L (ref 3–16)
BUN SERPL-MCNC: 7 MG/DL (ref 7–20)
CALCIUM SERPL-MCNC: 9.8 MG/DL (ref 8.3–10.6)
CHLORIDE SERPL-SCNC: 105 MMOL/L (ref 99–110)
CO2 SERPL-SCNC: 26 MMOL/L (ref 21–32)
CREAT SERPL-MCNC: 0.7 MG/DL (ref 0.9–1.3)
GFR SERPLBLD CREATININE-BSD FMLA CKD-EPI: >90 ML/MIN/{1.73_M2}
GLUCOSE SERPL-MCNC: 88 MG/DL (ref 70–99)
HBV SURFACE AB SERPL IA-ACNC: >1000 MIU/ML
PHOSPHATE SERPL-MCNC: 3.2 MG/DL (ref 2.5–4.9)
POTASSIUM SERPL-SCNC: 3.8 MMOL/L (ref 3.5–5.1)
PSA SERPL DL<=0.01 NG/ML-MCNC: 0.78 NG/ML (ref 0–4)
SODIUM SERPL-SCNC: 143 MMOL/L (ref 136–145)

## 2024-09-10 PROCEDURE — 3074F SYST BP LT 130 MM HG: CPT | Performed by: INTERNAL MEDICINE

## 2024-09-10 PROCEDURE — 99214 OFFICE O/P EST MOD 30 MIN: CPT | Performed by: INTERNAL MEDICINE

## 2024-09-10 PROCEDURE — 3079F DIAST BP 80-89 MM HG: CPT | Performed by: INTERNAL MEDICINE

## 2024-09-10 RX ORDER — LOSARTAN POTASSIUM AND HYDROCHLOROTHIAZIDE 25; 100 MG/1; MG/1
1 TABLET ORAL DAILY
Qty: 90 TABLET | Refills: 3 | Status: SHIPPED | OUTPATIENT
Start: 2024-09-10

## 2024-09-10 SDOH — ECONOMIC STABILITY: FOOD INSECURITY: WITHIN THE PAST 12 MONTHS, YOU WORRIED THAT YOUR FOOD WOULD RUN OUT BEFORE YOU GOT MONEY TO BUY MORE.: NEVER TRUE

## 2024-09-10 SDOH — ECONOMIC STABILITY: FOOD INSECURITY: WITHIN THE PAST 12 MONTHS, THE FOOD YOU BOUGHT JUST DIDN'T LAST AND YOU DIDN'T HAVE MONEY TO GET MORE.: NEVER TRUE

## 2024-09-10 SDOH — ECONOMIC STABILITY: INCOME INSECURITY: HOW HARD IS IT FOR YOU TO PAY FOR THE VERY BASICS LIKE FOOD, HOUSING, MEDICAL CARE, AND HEATING?: NOT HARD AT ALL

## 2024-09-10 ASSESSMENT — ENCOUNTER SYMPTOMS
SHORTNESS OF BREATH: 0
BLURRED VISION: 0
SORE THROAT: 0
RHINORRHEA: 0
ORTHOPNEA: 0
COUGH: 0
WHEEZING: 0
EYES NEGATIVE: 1
ALLERGIC/IMMUNOLOGIC NEGATIVE: 1

## 2024-09-10 ASSESSMENT — PATIENT HEALTH QUESTIONNAIRE - PHQ9
SUM OF ALL RESPONSES TO PHQ QUESTIONS 1-9: 0
2. FEELING DOWN, DEPRESSED OR HOPELESS: NOT AT ALL
1. LITTLE INTEREST OR PLEASURE IN DOING THINGS: NOT AT ALL
SUM OF ALL RESPONSES TO PHQ QUESTIONS 1-9: 0
SUM OF ALL RESPONSES TO PHQ9 QUESTIONS 1 & 2: 0

## 2024-11-01 ENCOUNTER — OFFICE VISIT (OUTPATIENT)
Age: 54
End: 2024-11-01

## 2024-11-01 VITALS
SYSTOLIC BLOOD PRESSURE: 116 MMHG | HEART RATE: 70 BPM | WEIGHT: 206 LBS | DIASTOLIC BLOOD PRESSURE: 88 MMHG | OXYGEN SATURATION: 97 % | TEMPERATURE: 97.7 F | BODY MASS INDEX: 30.42 KG/M2

## 2024-11-01 DIAGNOSIS — R07.9 CHEST PAIN, UNSPECIFIED TYPE: ICD-10-CM

## 2024-11-01 DIAGNOSIS — M94.0 TIETZE SYNDROME: Primary | ICD-10-CM

## 2024-11-01 RX ORDER — PREDNISONE 20 MG/1
20 TABLET ORAL DAILY
Qty: 5 TABLET | Refills: 0 | Status: SHIPPED | OUTPATIENT
Start: 2024-11-01 | End: 2024-11-06

## 2024-11-01 RX ORDER — IBUPROFEN 600 MG/1
600 TABLET, FILM COATED ORAL 3 TIMES DAILY PRN
Qty: 30 TABLET | Refills: 0 | Status: SHIPPED | OUTPATIENT
Start: 2024-11-01

## 2024-11-01 NOTE — PATIENT INSTRUCTIONS
Thank you for allowing us to care for you today and we hope you feel better soon  New Prescriptions    IBUPROFEN (ADVIL;MOTRIN) 600 MG TABLET    Take 1 tablet by mouth 3 times daily as needed for Pain    PREDNISONE (DELTASONE) 20 MG TABLET    Take 1 tablet by mouth daily for 5 days

## 2024-11-01 NOTE — PROGRESS NOTES
Carroll Mejia (:  1970) is a 54 y.o. male,New patient, here for evaluation of the following chief complaint(s):  Chest Pain (Symptom started two days ago. )      ASSESSMENT/PLAN:    ICD-10-CM    1. Tietze syndrome  M94.0 predniSONE (DELTASONE) 20 MG tablet     ibuprofen (ADVIL;MOTRIN) 600 MG tablet      2. Chest pain, unspecified type  R07.9 EKG 12 Lead - Clinic Performed          Dx Disposition: costochondritis  Education and handout provided on diagnosis and management of symptoms.   AVS reviewed with patient. Follow up as needed in UC or with PCP for new or worsening symptoms.   Return if symptoms worsen or fail to improve.  12 lead shows NSR left axis deviation   Discussed worsening symptoms and need to be seen in the ED and pt agrees    SUBJECTIVE/OBJECTIVE:  Patient presents today with complaints of right sided chest pain worse with movement that started 2 days ago denies any injury or trauma      History provided by:  Patient   used: No    Chest Pain         Vitals:    24 0957   BP: 116/88   Site: Right Upper Arm   Position: Sitting   Cuff Size: Medium Adult   Pulse: 70   Temp: 97.7 °F (36.5 °C)   TempSrc: Oral   SpO2: 97%   Weight: 93.4 kg (206 lb)       Review of Systems   Cardiovascular:  Positive for chest pain.       Physical Exam  Constitutional:       Appearance: Normal appearance.   HENT:      Head: Normocephalic.      Nose: Nose normal.      Mouth/Throat:      Mouth: Mucous membranes are moist.      Pharynx: Oropharynx is clear.   Cardiovascular:      Rate and Rhythm: Normal rate and regular rhythm.      Heart sounds: Normal heart sounds.   Pulmonary:      Effort: Pulmonary effort is normal. No respiratory distress.      Breath sounds: Normal breath sounds. No wheezing, rhonchi or rales.   Chest:      Chest wall: Tenderness (right sided) present.          Comments: Tender to palp  Musculoskeletal:         General: Normal range of motion.      Cervical back: Normal

## 2025-01-21 ENCOUNTER — OFFICE VISIT (OUTPATIENT)
Dept: PRIMARY CARE CLINIC | Age: 55
End: 2025-01-21
Payer: COMMERCIAL

## 2025-01-21 VITALS
DIASTOLIC BLOOD PRESSURE: 74 MMHG | WEIGHT: 213.6 LBS | HEIGHT: 69 IN | OXYGEN SATURATION: 96 % | SYSTOLIC BLOOD PRESSURE: 114 MMHG | RESPIRATION RATE: 16 BRPM | TEMPERATURE: 98.9 F | HEART RATE: 77 BPM | BODY MASS INDEX: 31.64 KG/M2

## 2025-01-21 DIAGNOSIS — G44.89 OTHER HEADACHE SYNDROME: ICD-10-CM

## 2025-01-21 DIAGNOSIS — R73.03 PRE-DIABETES: ICD-10-CM

## 2025-01-21 DIAGNOSIS — Z71.6 ENCOUNTER FOR SMOKING CESSATION COUNSELING: ICD-10-CM

## 2025-01-21 DIAGNOSIS — R04.0 EPISTAXIS: ICD-10-CM

## 2025-01-21 DIAGNOSIS — H53.8 BLURRED VISION: Primary | ICD-10-CM

## 2025-01-21 DIAGNOSIS — R20.0 NUMBNESS IN BOTH HANDS: ICD-10-CM

## 2025-01-21 DIAGNOSIS — R73.03 PREDIABETES: ICD-10-CM

## 2025-01-21 DIAGNOSIS — R53.83 OTHER FATIGUE: ICD-10-CM

## 2025-01-21 DIAGNOSIS — Z81.8 FAMILY HISTORY OF DEMENTIA: ICD-10-CM

## 2025-01-21 PROCEDURE — 3074F SYST BP LT 130 MM HG: CPT | Performed by: INTERNAL MEDICINE

## 2025-01-21 PROCEDURE — 99214 OFFICE O/P EST MOD 30 MIN: CPT | Performed by: INTERNAL MEDICINE

## 2025-01-21 PROCEDURE — 3078F DIAST BP <80 MM HG: CPT | Performed by: INTERNAL MEDICINE

## 2025-01-21 SDOH — ECONOMIC STABILITY: FOOD INSECURITY: WITHIN THE PAST 12 MONTHS, THE FOOD YOU BOUGHT JUST DIDN'T LAST AND YOU DIDN'T HAVE MONEY TO GET MORE.: NEVER TRUE

## 2025-01-21 SDOH — ECONOMIC STABILITY: FOOD INSECURITY: WITHIN THE PAST 12 MONTHS, YOU WORRIED THAT YOUR FOOD WOULD RUN OUT BEFORE YOU GOT MONEY TO BUY MORE.: NEVER TRUE

## 2025-01-21 ASSESSMENT — PATIENT HEALTH QUESTIONNAIRE - PHQ9
1. LITTLE INTEREST OR PLEASURE IN DOING THINGS: NOT AT ALL
SUM OF ALL RESPONSES TO PHQ QUESTIONS 1-9: 0
2. FEELING DOWN, DEPRESSED OR HOPELESS: NOT AT ALL
SUM OF ALL RESPONSES TO PHQ QUESTIONS 1-9: 0
SUM OF ALL RESPONSES TO PHQ9 QUESTIONS 1 & 2: 0
SUM OF ALL RESPONSES TO PHQ QUESTIONS 1-9: 0
SUM OF ALL RESPONSES TO PHQ QUESTIONS 1-9: 0

## 2025-01-21 ASSESSMENT — ENCOUNTER SYMPTOMS
SHORTNESS OF BREATH: 0
RHINORRHEA: 0
SORE THROAT: 0
COUGH: 0
ALLERGIC/IMMUNOLOGIC NEGATIVE: 1
NAUSEA: 0
SWOLLEN GLANDS: 0
VISUAL CHANGE: 1
CHANGE IN BOWEL HABIT: 0
VOMITING: 0
EYES NEGATIVE: 1
WHEEZING: 0

## 2025-01-21 NOTE — PATIENT INSTRUCTIONS
1 Patient Communication       Component  Ref Range & Units 9/10/24 1028 5/23/22 1014   PSA  0.00 - 4.00 ng/mL 0.78 1.1 R, CM

## 2025-01-21 NOTE — PROGRESS NOTES
Carroll Mejia (:  1970) is a 54 y.o. male,Established patient, here for evaluation of the following chief complaint(s):  Blood Pressure Check      Assessment & Plan   ASSESSMENT/PLAN:  1. Blurred vision:  refer to optometry for exam.  -     RAVI - Elisabeth Tan OD, Optometry, [facility]  -     Comprehensive Metabolic Panel; Future  2. Encounter for smoking cessation counseling, doing well with cessation, refill nicorette.  -     nicotine polacrilex (NICORETTE) 2 MG gum; Take 1 each by mouth as needed for Smoking cessation (chew for one hour prn urge to smoke. no more than 8 pieces a day.), Disp-110 each, R-5Normal  3. Epistaxis:  recurrent left nostril.  Refer to ENT for treatment.  No gross lesions seen on exam.   -     Tra Guzman MD, Otolaryngology, [facility]    4. Other headache syndrome , bilateral frontal parietal headaches through out the day for a few months, feels off balance frequently and numbness intermittently  in side of face.  Complains of not feeling alert.  Never confused or lost, mother had dementia so is concerned.  It is significant the he has RONNY and has not been using cpap for a few months.  Explained that sleep apnea can cause all of the symptoms that he is experiencing.  He agrees to restart cpap consistently and has kept up with his yearly Sleep MD visits and has up coming apt. In .    -     Sedimentation Rate; Future  MRI of brain.  -     Comprehensive Metabolic Panel; Future  5. Family history of dementia  -     MRI BRAIN WO CONTRAST; Future  6. Numbness in both hands, negative tinel's sign on exam bilaterally,  some decreased hand grasp bilaterally,  will get cervical spine xray and emg of upper extremity.   -     XR CERVICAL SPINE (4-5 VIEWS); Future  -     AFL - Jatin Cornelius MD, Neurology, [facility]  7. Other fatigue, restart cpap.    -     TSH reflex to FT4,FT3; Future  -     CBC with Auto Differential; Future  -     Vitamin

## 2025-01-29 ENCOUNTER — HOSPITAL ENCOUNTER (OUTPATIENT)
Dept: MRI IMAGING | Age: 55
Discharge: HOME | End: 2025-01-29
Attending: INTERNAL MEDICINE
Payer: COMMERCIAL

## 2025-01-29 DIAGNOSIS — Z81.8 FAMILY HISTORY OF DEMENTIA: ICD-10-CM

## 2025-01-29 PROCEDURE — 70551 MRI BRAIN STEM W/O DYE: CPT

## 2025-02-01 DIAGNOSIS — E55.9 VITAMIN D DEFICIENCY: ICD-10-CM

## 2025-02-04 RX ORDER — ERGOCALCIFEROL 1.25 MG/1
50000 CAPSULE, LIQUID FILLED ORAL WEEKLY
Qty: 12 CAPSULE | Refills: 3 | Status: SHIPPED | OUTPATIENT
Start: 2025-02-04

## 2025-02-07 DIAGNOSIS — G44.89 OTHER HEADACHE SYNDROME: ICD-10-CM

## 2025-02-07 DIAGNOSIS — R73.03 PRE-DIABETES: ICD-10-CM

## 2025-02-07 DIAGNOSIS — R53.83 OTHER FATIGUE: ICD-10-CM

## 2025-02-07 DIAGNOSIS — H53.8 BLURRED VISION: ICD-10-CM

## 2025-02-07 DIAGNOSIS — R73.03 PREDIABETES: ICD-10-CM

## 2025-02-07 DIAGNOSIS — R04.0 EPISTAXIS: ICD-10-CM

## 2025-02-07 LAB
25(OH)D3 SERPL-MCNC: 45 NG/ML
ALBUMIN SERPL-MCNC: 3.9 G/DL (ref 3.4–5)
ALBUMIN/GLOB SERPL: 1.3 {RATIO} (ref 1.1–2.2)
ALP SERPL-CCNC: 76 U/L (ref 40–129)
ALT SERPL-CCNC: 52 U/L (ref 10–40)
ANION GAP SERPL CALCULATED.3IONS-SCNC: 8 MMOL/L (ref 3–16)
AST SERPL-CCNC: 36 U/L (ref 15–37)
BASOPHILS # BLD: 0.1 K/UL (ref 0–0.2)
BASOPHILS NFR BLD: 1.2 %
BILIRUB SERPL-MCNC: 0.4 MG/DL (ref 0–1)
BUN SERPL-MCNC: 12 MG/DL (ref 7–20)
CALCIUM SERPL-MCNC: 9.2 MG/DL (ref 8.3–10.6)
CHLORIDE SERPL-SCNC: 106 MMOL/L (ref 99–110)
CO2 SERPL-SCNC: 26 MMOL/L (ref 21–32)
CREAT SERPL-MCNC: 0.7 MG/DL (ref 0.9–1.3)
DEPRECATED RDW RBC AUTO: 14 % (ref 12.4–15.4)
EOSINOPHIL # BLD: 0.2 K/UL (ref 0–0.6)
EOSINOPHIL NFR BLD: 3.8 %
EST. AVERAGE GLUCOSE BLD GHB EST-MCNC: 122.6 MG/DL
FOLATE SERPL-MCNC: 13 NG/ML (ref 4.78–24.2)
GFR SERPLBLD CREATININE-BSD FMLA CKD-EPI: >90 ML/MIN/{1.73_M2}
GLUCOSE SERPL-MCNC: 106 MG/DL (ref 70–99)
HBA1C MFR BLD: 5.9 %
HCT VFR BLD AUTO: 41.3 % (ref 40.5–52.5)
HGB BLD-MCNC: 14.1 G/DL (ref 13.5–17.5)
LYMPHOCYTES # BLD: 2.3 K/UL (ref 1–5.1)
LYMPHOCYTES NFR BLD: 41.2 %
MCH RBC QN AUTO: 31.8 PG (ref 26–34)
MCHC RBC AUTO-ENTMCNC: 34.2 G/DL (ref 31–36)
MCV RBC AUTO: 92.8 FL (ref 80–100)
MONOCYTES # BLD: 0.6 K/UL (ref 0–1.3)
MONOCYTES NFR BLD: 11.3 %
NEUTROPHILS # BLD: 2.4 K/UL (ref 1.7–7.7)
NEUTROPHILS NFR BLD: 42.5 %
PLATELET # BLD AUTO: 204 K/UL (ref 135–450)
PMV BLD AUTO: 10.1 FL (ref 5–10.5)
POTASSIUM SERPL-SCNC: 3.7 MMOL/L (ref 3.5–5.1)
PROT SERPL-MCNC: 6.9 G/DL (ref 6.4–8.2)
RBC # BLD AUTO: 4.45 M/UL (ref 4.2–5.9)
SODIUM SERPL-SCNC: 140 MMOL/L (ref 136–145)
TSH SERPL DL<=0.005 MIU/L-ACNC: 2.17 UIU/ML (ref 0.27–4.2)
VIT B12 SERPL-MCNC: 371 PG/ML (ref 211–911)
WBC # BLD AUTO: 5.7 K/UL (ref 4–11)

## 2025-02-08 DIAGNOSIS — R74.01 ELEVATED ALT MEASUREMENT: ICD-10-CM

## 2025-02-08 DIAGNOSIS — F10.91 ALCOHOL USE DISORDER IN REMISSION: ICD-10-CM

## 2025-02-08 DIAGNOSIS — I10 ESSENTIAL HYPERTENSION: ICD-10-CM

## 2025-02-08 DIAGNOSIS — F17.200 TOBACCO USE DISORDER: Primary | ICD-10-CM

## 2025-02-08 DIAGNOSIS — I71.40 ABDOMINAL AORTIC ANEURYSM (AAA), 30-34 MM DIAMETER (HCC): ICD-10-CM

## 2025-02-08 LAB
BACTERIA URNS QL MICRO: NORMAL /HPF
BILIRUB UR QL STRIP.AUTO: NEGATIVE
CLARITY UR: CLEAR
COLOR UR: YELLOW
EPI CELLS #/AREA URNS AUTO: 0 /HPF (ref 0–5)
ERYTHROCYTE [SEDIMENTATION RATE] IN BLOOD BY WESTERGREN METHOD: 14 MM/HR (ref 0–20)
GLUCOSE UR STRIP.AUTO-MCNC: NEGATIVE MG/DL
HGB UR QL STRIP.AUTO: NEGATIVE
HYALINE CASTS #/AREA URNS AUTO: 1 /LPF (ref 0–8)
KETONES UR STRIP.AUTO-MCNC: NEGATIVE MG/DL
LEUKOCYTE ESTERASE UR QL STRIP.AUTO: NEGATIVE
NITRITE UR QL STRIP.AUTO: NEGATIVE
PH UR STRIP.AUTO: 7 [PH] (ref 5–8)
PROT UR STRIP.AUTO-MCNC: NEGATIVE MG/DL
RBC CLUMPS #/AREA URNS AUTO: 1 /HPF (ref 0–4)
SP GR UR STRIP.AUTO: 1.02 (ref 1–1.03)
UA DIPSTICK W REFLEX MICRO PNL UR: NORMAL
URN SPEC COLLECT METH UR: NORMAL
UROBILINOGEN UR STRIP-ACNC: 1 E.U./DL
WBC #/AREA URNS AUTO: 1 /HPF (ref 0–5)

## 2025-02-08 NOTE — RESULT ENCOUNTER NOTE
The thyroid level is normal.  The vitamin D level is now normal.  Continue supplement to maintain a normal level.  The vitamin B12 was low normal and normal folic acid.  Take an over-the-counter B12 supplement of 500 mcg daily.  Sometimes this helps with fatigue.  There is no anemia and normal white blood cell count and platelet count.  The A1c continues to improve with healthy lifestyle and is at 5.9.  It has decreased from 6.1.  Normal is 5.6 or less.  You are in the early prediabetic stage which ranges from 5.7-6.4.  Diabetes is 6.5 and above.  Keep up the good work with our goal to get you in the normal non-diabetic range.  Normal kidney blood test.  One of the 6 liver test was very mildly elevated.  The other liver tests were completely normal.    I want to get a CT of the abdomen and pelvis with IV contrast for 2 reasons.  Recent #1 to look at the liver to make sure it is normal and no history of scarring.  Reason #2 it is time for the 3-year follow-up of the small 3 cm abdominal aortic aneurysm.  It should be stable since you have maintain good blood pressure control.  Both things can be evaluated with this exam.

## 2025-02-10 LAB — FRUCTOSAMINE SERPL-SCNC: 258 UMOL/L (ref 205–285)

## 2025-02-14 NOTE — RESULT ENCOUNTER NOTE
Good news!  The Fructosamine level which lets us know your blood sugar control over the past 3 weeks was in the normal nondiabetic range.  Keep up the good work!

## 2025-02-18 ENCOUNTER — HOSPITAL ENCOUNTER (OUTPATIENT)
Dept: CT IMAGING | Age: 55
Discharge: HOME | End: 2025-02-18
Attending: INTERNAL MEDICINE
Payer: COMMERCIAL

## 2025-02-18 DIAGNOSIS — F10.91 ALCOHOL USE DISORDER IN REMISSION: ICD-10-CM

## 2025-02-18 DIAGNOSIS — R74.01 ELEVATED ALT MEASUREMENT: ICD-10-CM

## 2025-02-18 DIAGNOSIS — I71.40 ABDOMINAL AORTIC ANEURYSM (AAA), 30-34 MM DIAMETER: ICD-10-CM

## 2025-02-18 DIAGNOSIS — I10 ESSENTIAL HYPERTENSION: ICD-10-CM

## 2025-02-18 DIAGNOSIS — F17.200 TOBACCO USE DISORDER: ICD-10-CM

## 2025-02-18 PROCEDURE — 74177 CT ABD & PELVIS W/CONTRAST: CPT

## 2025-02-18 PROCEDURE — 6360000004 HC RX CONTRAST MEDICATION: Performed by: INTERNAL MEDICINE

## 2025-02-18 RX ADMIN — IOMEPROL INJECTION 100 ML: 714 INJECTION, SOLUTION INTRAVASCULAR at 09:30

## 2025-02-19 ENCOUNTER — OFFICE VISIT (OUTPATIENT)
Dept: ENT CLINIC | Age: 55
End: 2025-02-19
Payer: COMMERCIAL

## 2025-02-19 VITALS
HEART RATE: 75 BPM | BODY MASS INDEX: 31.55 KG/M2 | DIASTOLIC BLOOD PRESSURE: 82 MMHG | HEIGHT: 69 IN | OXYGEN SATURATION: 95 % | WEIGHT: 213 LBS | TEMPERATURE: 98.5 F | SYSTOLIC BLOOD PRESSURE: 121 MMHG

## 2025-02-19 DIAGNOSIS — R04.0 EPISTAXIS: Primary | ICD-10-CM

## 2025-02-19 PROCEDURE — 31231 NASAL ENDOSCOPY DX: CPT | Performed by: OTOLARYNGOLOGY

## 2025-02-19 PROCEDURE — 99203 OFFICE O/P NEW LOW 30 MIN: CPT | Performed by: OTOLARYNGOLOGY

## 2025-02-19 PROCEDURE — 3079F DIAST BP 80-89 MM HG: CPT | Performed by: OTOLARYNGOLOGY

## 2025-02-19 PROCEDURE — 3074F SYST BP LT 130 MM HG: CPT | Performed by: OTOLARYNGOLOGY

## 2025-02-19 NOTE — PROGRESS NOTES
[facility]  DIVISION OF OTOLARYNGOLOGY- HEAD & NECK SURGERY  CONSULT      Patient Name: Carroll Mejia  Medical Record Number:  [id number]  Primary Care Physician:  Jeny Conti MD  Date of Consultation: 2/19/2025    Chief Complaint: Nosebleeds        HISTORY OF PRESENT ILLNESS  Carroll is a(n) 54 y.o. male who presents for evaluation nosebleeds.  The patient says that he had a nosebleed about a month ago.  It was on the left side.  He is also had some blood-tinged secretions.  He has had a history of sinus infections.  He has never had nasal surgery.  He does not have a bleeding disorder.  He is not on any blood thinners.  Has not bled in a month.            REVIEW OF SYSTEMS  As above    PHYSICAL EXAM  GENERAL: No Acute Distress, Alert and Oriented, no Hoarseness, strong voice  EYES: EOMI, Anti-icteric  HENT:   Head: Normocephalic and atraumatic.   Face:  Symmetric, facial nerve intact, no sinus tenderness  Right Ear: Normal external ear, normal external auditory canal, intact tympanic membrane with normal mobility and aerated middle ear  Left Ear: Normal external ear, normal external auditory canal, intact tympanic membrane with normal mobility and aerated middle ear  Mouth/Oral Cavity:  normal lips, Uvula is midline, no mucosal lesions, no trismus,  Oropharynx/Larynx:  normal oropharynx  Nose:Normal external nasal appearance.  As above  NECK: Normal range of motion, no thyromegaly, trachea is midline, no lymphadenopathy, no neck masses, no crepitus        PROCEDURE  Nasal endoscopy  Afrin and lidocaine were applied to nasal cavity.  A scope was used to visualize left nasal cavity.  He had some mucosal edema, but no obvious source of bleeding.  Middle meatus was normal.  On the right side he had a pretty bad rightward septal deviation with mucosal edema.  No polyps or purulent drainage        ASSESSMENT/PLAN  1. Epistaxis  He has not had any bleeding in a month.  It be difficult to know where this

## 2025-02-20 NOTE — RESULT ENCOUNTER NOTE
I am happy to report that the CT of the abdomen and pelvis was normal.  There was no aortic aneurysm seen and the liver looked normal.

## 2025-03-03 ENCOUNTER — OFFICE VISIT (OUTPATIENT)
Dept: PRIMARY CARE CLINIC | Age: 55
End: 2025-03-03
Payer: COMMERCIAL

## 2025-03-03 VITALS
DIASTOLIC BLOOD PRESSURE: 70 MMHG | HEIGHT: 69 IN | OXYGEN SATURATION: 98 % | SYSTOLIC BLOOD PRESSURE: 120 MMHG | RESPIRATION RATE: 16 BRPM | HEART RATE: 89 BPM | TEMPERATURE: 98.4 F | BODY MASS INDEX: 30.21 KG/M2 | WEIGHT: 204 LBS

## 2025-03-03 DIAGNOSIS — Z23 NEED FOR HEPATITIS B VACCINATION: ICD-10-CM

## 2025-03-03 DIAGNOSIS — R74.01 ELEVATED ALANINE AMINOTRANSFERASE (ALT) LEVEL: ICD-10-CM

## 2025-03-03 DIAGNOSIS — I10 ESSENTIAL HYPERTENSION: ICD-10-CM

## 2025-03-03 DIAGNOSIS — E78.2 MIXED HYPERLIPIDEMIA: ICD-10-CM

## 2025-03-03 DIAGNOSIS — Z00.00 ENCOUNTER FOR PREVENTATIVE ADULT HEALTH CARE EXAMINATION: Primary | ICD-10-CM

## 2025-03-03 LAB
CK SERPL-CCNC: 5104 U/L (ref 39–308)
GGT SERPL-CCNC: 18 U/L (ref 8–61)

## 2025-03-03 PROCEDURE — 3078F DIAST BP <80 MM HG: CPT | Performed by: INTERNAL MEDICINE

## 2025-03-03 PROCEDURE — 3074F SYST BP LT 130 MM HG: CPT | Performed by: INTERNAL MEDICINE

## 2025-03-03 PROCEDURE — 36415 COLL VENOUS BLD VENIPUNCTURE: CPT | Performed by: INTERNAL MEDICINE

## 2025-03-03 PROCEDURE — 90739 HEPB VACC 2/4 DOSE ADULT IM: CPT | Performed by: INTERNAL MEDICINE

## 2025-03-03 PROCEDURE — 99396 PREV VISIT EST AGE 40-64: CPT | Performed by: INTERNAL MEDICINE

## 2025-03-03 PROCEDURE — 90471 IMMUNIZATION ADMIN: CPT | Performed by: INTERNAL MEDICINE

## 2025-03-03 SDOH — ECONOMIC STABILITY: FOOD INSECURITY: WITHIN THE PAST 12 MONTHS, YOU WORRIED THAT YOUR FOOD WOULD RUN OUT BEFORE YOU GOT MONEY TO BUY MORE.: NEVER TRUE

## 2025-03-03 SDOH — ECONOMIC STABILITY: FOOD INSECURITY: WITHIN THE PAST 12 MONTHS, THE FOOD YOU BOUGHT JUST DIDN'T LAST AND YOU DIDN'T HAVE MONEY TO GET MORE.: NEVER TRUE

## 2025-03-03 ASSESSMENT — ENCOUNTER SYMPTOMS
VOMITING: 0
NAUSEA: 0
RHINORRHEA: 0
SHORTNESS OF BREATH: 0
ALLERGIC/IMMUNOLOGIC NEGATIVE: 1
SORE THROAT: 0
WHEEZING: 0
COUGH: 0
EYES NEGATIVE: 1

## 2025-03-03 ASSESSMENT — PATIENT HEALTH QUESTIONNAIRE - PHQ9
SUM OF ALL RESPONSES TO PHQ QUESTIONS 1-9: 0
2. FEELING DOWN, DEPRESSED OR HOPELESS: NOT AT ALL
SUM OF ALL RESPONSES TO PHQ QUESTIONS 1-9: 0
1. LITTLE INTEREST OR PLEASURE IN DOING THINGS: NOT AT ALL

## 2025-03-03 NOTE — PROGRESS NOTES
Diabetes screen  Discontinued         Diagnosis Orders   1. Encounter for preventative adult health care examination completed.       2. Elevated alanine aminotransferase (ALT) level will further evaluate with CK and GGT. CK    Gamma GT    Gamma GT    CK                3 Essential hypertension is controlled.  Continue current medications  BP Readings from Last 3 Encounters:   03/03/25 120/70   02/19/25 121/82   01/21/25 114/74           5. Mixed hyperlipidemia controlled on statin therapy, but with elevated ALT will check CK.  Not really having myalgias or weakness.  Lab Results   Component Value Date    CHOL 127 03/11/2024    CHOL 154 09/05/2023    CHOL 151 03/03/2023     Lab Results   Component Value Date    TRIG 41 03/11/2024    TRIG 63 09/05/2023    TRIG 63 03/03/2023     Lab Results   Component Value Date    HDL 48 03/11/2024    HDL 53 09/05/2023    HDL 54 03/03/2023     Lab Results   Component Value Date    LDL 71 03/11/2024    LDL 88 09/05/2023    LDL 84 03/03/2023     Lab Results   Component Value Date    VLDL 8 03/11/2024    VLDL 13 09/05/2023    VLDL 13 03/03/2023     No results found for: \"CHOLHDLRATIO\"               Return in about 3 months (around 6/3/2025).           --ADAM BERNAL MD

## 2025-03-04 DIAGNOSIS — T46.6X5A STATIN MYOPATHY: Primary | ICD-10-CM

## 2025-03-04 DIAGNOSIS — G72.0 STATIN MYOPATHY: Primary | ICD-10-CM

## 2025-03-04 NOTE — RESULT ENCOUNTER NOTE
Patient called and informed of elevated CK.  Normal kidney function.  Encouraged to hydrate regularly and stop atorvastatin 20 mg daily and get a follow-up CK weekly.  Standing order placed for weekly CK for 3 weeks.  Patient is having some muscle aching.  But not severe surprisingly.

## 2025-03-09 PROBLEM — I10 ESSENTIAL HYPERTENSION: Chronic | Status: ACTIVE | Noted: 2023-03-06

## 2025-03-12 DIAGNOSIS — I10 ESSENTIAL HYPERTENSION: ICD-10-CM

## 2025-03-12 NOTE — TELEPHONE ENCOUNTER
Medication:   Requested Prescriptions     Pending Prescriptions Disp Refills    amLODIPine (NORVASC) 10 MG tablet [Pharmacy Med Name: AMLODIPINE BESYLATE 10 MG TAB] 90 tablet 3     Sig: TAKE 1 TABLET BY MOUTH EVERY DAY        Last Filled:      Patient Phone Number: [phone] (home)     Last appt: 3/3/2025   Next appt: 4/3/2025    Last OARRS:        No data to display

## 2025-03-13 ENCOUNTER — RESULTS FOLLOW-UP (OUTPATIENT)
Dept: PRIMARY CARE CLINIC | Age: 55
End: 2025-03-13

## 2025-03-13 DIAGNOSIS — T46.6X5A STATIN MYOPATHY: Primary | ICD-10-CM

## 2025-03-13 DIAGNOSIS — G72.0 STATIN MYOPATHY: Primary | ICD-10-CM

## 2025-03-13 LAB — CK SERPL-CCNC: 263 U/L (ref 39–308)

## 2025-03-13 PROCEDURE — 36415 COLL VENOUS BLD VENIPUNCTURE: CPT | Performed by: INTERNAL MEDICINE

## 2025-03-13 RX ORDER — AMLODIPINE BESYLATE 10 MG/1
10 TABLET ORAL DAILY
Qty: 90 TABLET | Refills: 3 | Status: SHIPPED | OUTPATIENT
Start: 2025-03-13

## 2025-03-14 NOTE — RESULT ENCOUNTER NOTE
I am happy to report with stopping the cholesterol medicine your muscle test is back to normal.  I will start the new cholesterol medicine rosuvastatin that is much more friendly to the muscles at your follow-up visit and we will repeat the muscle test after you have been on it a month.  Will wait a few months just to give your body a chance to clear itself of the old medicine occasion.  She

## 2025-03-21 DIAGNOSIS — G72.0 STATIN MYOPATHY: Primary | ICD-10-CM

## 2025-03-21 DIAGNOSIS — T46.6X5A STATIN MYOPATHY: Primary | ICD-10-CM

## 2025-03-21 LAB — CK SERPL-CCNC: 283 U/L (ref 39–308)

## 2025-03-21 PROCEDURE — 36415 COLL VENOUS BLD VENIPUNCTURE: CPT | Performed by: INTERNAL MEDICINE

## 2025-03-27 ENCOUNTER — RESULTS FOLLOW-UP (OUTPATIENT)
Dept: PRIMARY CARE CLINIC | Age: 55
End: 2025-03-27

## 2025-03-27 NOTE — RESULT ENCOUNTER NOTE
I am happy to report that your muscle test levels that remain normal. Will start a new cholesterol medication . You can  at pharmacy and follow up in 2 months.

## 2025-04-07 ENCOUNTER — HOSPITAL ENCOUNTER (OUTPATIENT)
Dept: CT IMAGING | Age: 55
Discharge: HOME | End: 2025-04-07
Attending: INTERNAL MEDICINE
Payer: COMMERCIAL

## 2025-04-07 DIAGNOSIS — R91.1 LUNG NODULE: ICD-10-CM

## 2025-04-07 PROCEDURE — 71250 CT THORAX DX C-: CPT

## 2025-04-21 ENCOUNTER — RESULTS FOLLOW-UP (OUTPATIENT)
Dept: PULMONOLOGY | Age: 55
End: 2025-04-21

## 2025-06-03 ENCOUNTER — OFFICE VISIT (OUTPATIENT)
Dept: PRIMARY CARE CLINIC | Age: 55
End: 2025-06-03
Payer: COMMERCIAL

## 2025-06-03 VITALS
BODY MASS INDEX: 28.74 KG/M2 | OXYGEN SATURATION: 99 % | HEART RATE: 69 BPM | DIASTOLIC BLOOD PRESSURE: 79 MMHG | WEIGHT: 194.6 LBS | SYSTOLIC BLOOD PRESSURE: 116 MMHG | RESPIRATION RATE: 18 BRPM

## 2025-06-03 DIAGNOSIS — E78.2 MIXED HYPERLIPIDEMIA: Primary | ICD-10-CM

## 2025-06-03 DIAGNOSIS — G72.0 STATIN MYOPATHY: ICD-10-CM

## 2025-06-03 DIAGNOSIS — Z71.6 ENCOUNTER FOR SMOKING CESSATION COUNSELING: ICD-10-CM

## 2025-06-03 DIAGNOSIS — I10 ESSENTIAL HYPERTENSION: ICD-10-CM

## 2025-06-03 DIAGNOSIS — T46.6X5A STATIN MYOPATHY: ICD-10-CM

## 2025-06-03 PROCEDURE — 3074F SYST BP LT 130 MM HG: CPT | Performed by: INTERNAL MEDICINE

## 2025-06-03 PROCEDURE — 99214 OFFICE O/P EST MOD 30 MIN: CPT | Performed by: INTERNAL MEDICINE

## 2025-06-03 PROCEDURE — 3078F DIAST BP <80 MM HG: CPT | Performed by: INTERNAL MEDICINE

## 2025-06-03 RX ORDER — METOPROLOL SUCCINATE 100 MG/1
100 TABLET, EXTENDED RELEASE ORAL DAILY
Qty: 90 TABLET | Refills: 3 | Status: SHIPPED | OUTPATIENT
Start: 2025-06-03

## 2025-06-03 RX ORDER — BUPROPION HYDROCHLORIDE 150 MG/1
150 TABLET ORAL EVERY MORNING
Qty: 30 TABLET | Refills: 3 | Status: SHIPPED | OUTPATIENT
Start: 2025-06-03

## 2025-06-03 RX ORDER — BEMPEDOIC ACID 180 MG/1
1 TABLET, FILM COATED ORAL DAILY
Qty: 90 TABLET | Refills: 3 | Status: SHIPPED | OUTPATIENT
Start: 2025-06-03

## 2025-06-03 RX ORDER — AMLODIPINE BESYLATE 10 MG/1
10 TABLET ORAL DAILY
Qty: 90 TABLET | Refills: 1 | Status: SHIPPED | OUTPATIENT
Start: 2025-06-03

## 2025-06-03 RX ORDER — LOSARTAN POTASSIUM AND HYDROCHLOROTHIAZIDE 25; 100 MG/1; MG/1
1 TABLET ORAL DAILY
Qty: 90 TABLET | Refills: 3 | Status: SHIPPED | OUTPATIENT
Start: 2025-06-03

## 2025-06-09 ENCOUNTER — HOSPITAL ENCOUNTER (EMERGENCY)
Age: 55
Discharge: HOME | End: 2025-06-09
Attending: EMERGENCY MEDICINE
Payer: COMMERCIAL

## 2025-06-09 ENCOUNTER — APPOINTMENT (OUTPATIENT)
Dept: CT IMAGING | Age: 55
End: 2025-06-09
Payer: COMMERCIAL

## 2025-06-09 VITALS
TEMPERATURE: 98.4 F | HEIGHT: 69 IN | HEART RATE: 66 BPM | DIASTOLIC BLOOD PRESSURE: 72 MMHG | RESPIRATION RATE: 14 BRPM | SYSTOLIC BLOOD PRESSURE: 114 MMHG | BODY MASS INDEX: 29.19 KG/M2 | OXYGEN SATURATION: 100 % | WEIGHT: 197.09 LBS

## 2025-06-09 DIAGNOSIS — R31.9 HEMATURIA, UNSPECIFIED TYPE: Primary | ICD-10-CM

## 2025-06-09 LAB
BILIRUB UR QL STRIP.AUTO: NEGATIVE
CLARITY UR: ABNORMAL
COLOR UR: ABNORMAL
GLUCOSE UR STRIP.AUTO-MCNC: 100 MG/DL
HGB UR QL STRIP.AUTO: ABNORMAL
KETONES UR STRIP.AUTO-MCNC: 15 MG/DL
LEUKOCYTE ESTERASE UR QL STRIP.AUTO: ABNORMAL
NITRITE UR QL STRIP.AUTO: POSITIVE
PH UR STRIP.AUTO: 7.5 [PH] (ref 5–8)
PROT UR STRIP.AUTO-MCNC: >=300 MG/DL
RBC #/AREA URNS HPF: >100 /HPF (ref 0–4)
SP GR UR STRIP.AUTO: 1.01 (ref 1–1.03)
UA COMPLETE W REFLEX CULTURE PNL UR: ABNORMAL
UA DIPSTICK W REFLEX MICRO PNL UR: YES
URN SPEC COLLECT METH UR: ABNORMAL
UROBILINOGEN UR STRIP-ACNC: 4 E.U./DL
WBC #/AREA URNS HPF: ABNORMAL /HPF (ref 0–5)

## 2025-06-09 PROCEDURE — 81001 URINALYSIS AUTO W/SCOPE: CPT

## 2025-06-09 PROCEDURE — 74176 CT ABD & PELVIS W/O CONTRAST: CPT

## 2025-06-09 PROCEDURE — 99284 EMERGENCY DEPT VISIT MOD MDM: CPT

## 2025-06-09 RX ORDER — CEFUROXIME AXETIL 250 MG/1
250 TABLET ORAL 2 TIMES DAILY
Qty: 14 TABLET | Refills: 0 | Status: SHIPPED | OUTPATIENT
Start: 2025-06-09 | End: 2025-06-16

## 2025-06-09 RX ORDER — METHOCARBAMOL 750 MG/1
750 TABLET, FILM COATED ORAL 4 TIMES DAILY
Qty: 40 TABLET | Refills: 0 | Status: SHIPPED | OUTPATIENT
Start: 2025-06-09 | End: 2025-06-19

## 2025-06-09 RX ORDER — TAMSULOSIN HYDROCHLORIDE 0.4 MG/1
0.4 CAPSULE ORAL DAILY
Qty: 30 CAPSULE | Refills: 0 | Status: SHIPPED | OUTPATIENT
Start: 2025-06-09

## 2025-06-09 RX ORDER — HYDROCODONE BITARTRATE AND ACETAMINOPHEN 5; 325 MG/1; MG/1
1 TABLET ORAL EVERY 4 HOURS PRN
Qty: 18 TABLET | Refills: 0 | Status: SHIPPED | OUTPATIENT
Start: 2025-06-09 | End: 2025-06-12

## 2025-06-09 ASSESSMENT — LIFESTYLE VARIABLES
HOW OFTEN DO YOU HAVE A DRINK CONTAINING ALCOHOL: NEVER
HOW MANY STANDARD DRINKS CONTAINING ALCOHOL DO YOU HAVE ON A TYPICAL DAY: PATIENT DOES NOT DRINK

## 2025-06-09 ASSESSMENT — ENCOUNTER SYMPTOMS
ALLERGIC/IMMUNOLOGIC NEGATIVE: 1
WHEEZING: 0
NAUSEA: 0
COUGH: 0
VOMITING: 0
EYES NEGATIVE: 1
SORE THROAT: 0
RHINORRHEA: 0
SHORTNESS OF BREATH: 0

## 2025-06-09 ASSESSMENT — PAIN SCALES - GENERAL
PAINLEVEL_OUTOF10: 0

## 2025-06-09 NOTE — ED NOTES
Awake alert  States 2 hours ago he started to urinate blood  No injury  He has had this two times in the past and it stopped on its own

## 2025-06-09 NOTE — ED PROVIDER NOTES
Emergency Department Encounter    Patient: Carroll Mejia  MRN: [id number]  : 1970  Date of Evaluation: 2025  ED Provider:  SANDY MCCORD MD    Triage Chief Complaint:   Hematuria (A couple hours ago urinated blood Dark purple urin  very dark  Unable to see through)    [place]:  Carroll Mejia is a 55 y.o. male that presents ***    ROS - see HPI, below listed is current ROS at time of my eval:  General:  No fevers, no chills, no weakness  Eyes:  No recent vison changes, no discharge  ENT:  No sore throat, no nasal congestion, no hearing changes  Cardiovascular:  No chest pain, no palpitations  Respiratory:  No shortness of breath, no cough  Gastrointestinal:  No pain, no nausea, no vomiting, no diarrhea  Musculoskeletal:  No muscle pain, no joint pain  Skin:  No rash, no pruritis, no easy bruising  Neurologic:  No speech problems, no headache  Genitourinary:  No dysuria, no hematuria, + flank pain  Endocrine:  No unexpected weight gain, no unexpected weight loss  Extremities:  no edema, no pain    Past Medical History:   Diagnosis Date    Alcohol abuse     Centrilobular emphysema (HCC) 2023    Depression     Diverticulitis     Hyperlipidemia     Hypertension     Sleep apnea      Past Surgical History:   Procedure Laterality Date    COLECTOMY      HAND SURGERY Right 14    Middle finger revision amputation     Family History   Problem Relation Age of Onset    Hypertension Mother     Diabetes Mother     Kidney Disease Mother     Peripheral Vascular Disease Mother     Dementia Mother     Coronary Art Dis Father     Prostate Cancer Paternal Uncle 66             Social History     Socioeconomic History    Marital status: Legally      Spouse name: Not on file    Number of children: 2    Years of education: Not on file    Highest education level: Not on file   Occupational History    Not on file   Tobacco Use    Smoking status: Every Day     Current packs/day: 0.25     Average

## 2025-06-09 NOTE — ED NOTES
Dc'd to home  aware to call Urology in am for cystoscopy and recheck of bladder  He has had one of these before  Aware to return for increased pain or inability to urinate.  Walked out with ease  Skin warm and dry  resp easy and unlabored

## 2025-06-10 ENCOUNTER — PATIENT MESSAGE (OUTPATIENT)
Dept: PULMONOLOGY | Age: 55
End: 2025-06-10

## 2025-06-10 NOTE — PROGRESS NOTES
range of motion.      Cervical back: Normal range of motion and neck supple. No rigidity or tenderness.   Lymphadenopathy:      Cervical: No cervical adenopathy.   Skin:     General: Skin is warm and dry.      Findings: No lesion or rash.   Neurological:      General: No focal deficit present.      Mental Status: He is alert and oriented to person, place, and time.      Cranial Nerves: No cranial nerve deficit.      Sensory: No sensory deficit.      Motor: No weakness.      Gait: Gait normal.      Comments: Neg tinel's  Negative finger to nose and heal to shin bilaterally.  Equal upper and lower leg strength  Some weakness of hands   Psychiatric:         Mood and Affect: Mood normal.         Behavior: Behavior normal.         Thought Content: Thought content normal.         Judgment: Judgment normal.                  An electronic signature was used to authenticate this note.    --ADAM BERNAL MD

## 2025-07-08 DIAGNOSIS — N52.9 VASCULOGENIC ERECTILE DYSFUNCTION, UNSPECIFIED VASCULOGENIC ERECTILE DYSFUNCTION TYPE: Primary | ICD-10-CM

## 2025-07-08 RX ORDER — TADALAFIL 10 MG/1
10 TABLET ORAL PRN
Qty: 30 TABLET | Refills: 3 | Status: SHIPPED | OUTPATIENT
Start: 2025-07-08

## 2025-07-19 ENCOUNTER — APPOINTMENT (OUTPATIENT)
Dept: CT IMAGING | Age: 55
DRG: 392 | End: 2025-07-19
Attending: EMERGENCY MEDICINE
Payer: COMMERCIAL

## 2025-07-19 ENCOUNTER — HOSPITAL ENCOUNTER (EMERGENCY)
Age: 55
Discharge: LEFT BEFORE DISCHARGE | DRG: 392 | End: 2025-07-19
Attending: EMERGENCY MEDICINE
Payer: COMMERCIAL

## 2025-07-19 VITALS
SYSTOLIC BLOOD PRESSURE: 127 MMHG | TEMPERATURE: 97.7 F | OXYGEN SATURATION: 98 % | HEART RATE: 68 BPM | RESPIRATION RATE: 18 BRPM | WEIGHT: 189.6 LBS | DIASTOLIC BLOOD PRESSURE: 79 MMHG | HEIGHT: 69 IN | BODY MASS INDEX: 28.08 KG/M2

## 2025-07-19 DIAGNOSIS — R79.89 TROPONIN LEVEL ELEVATED: ICD-10-CM

## 2025-07-19 DIAGNOSIS — R10.13 EPIGASTRIC PAIN: Primary | ICD-10-CM

## 2025-07-19 DIAGNOSIS — D64.9 ANEMIA, UNSPECIFIED TYPE: ICD-10-CM

## 2025-07-19 LAB
ALBUMIN SERPL-MCNC: 3.9 G/DL (ref 3.4–5)
ALBUMIN/GLOB SERPL: 1.3 {RATIO} (ref 1.1–2.2)
ALP SERPL-CCNC: 52 U/L (ref 40–129)
ALT SERPL-CCNC: 43 U/L (ref 10–40)
ANION GAP SERPL CALCULATED.3IONS-SCNC: 10 MMOL/L (ref 3–16)
AST SERPL-CCNC: 43 U/L (ref 15–37)
BASOPHILS # BLD: 0.1 K/UL (ref 0–0.2)
BASOPHILS NFR BLD: 2.5 %
BILIRUB SERPL-MCNC: 0.4 MG/DL (ref 0–1)
BILIRUB UR QL STRIP.AUTO: NEGATIVE
BUN SERPL-MCNC: 14 MG/DL (ref 7–20)
CALCIUM SERPL-MCNC: 9.3 MG/DL (ref 8.3–10.6)
CHLORIDE SERPL-SCNC: 104 MMOL/L (ref 99–110)
CLARITY UR: CLEAR
CO2 SERPL-SCNC: 26 MMOL/L (ref 21–32)
COLOR UR: YELLOW
CREAT SERPL-MCNC: 0.8 MG/DL (ref 0.9–1.3)
DEPRECATED RDW RBC AUTO: 14.2 % (ref 12.4–15.4)
EOSINOPHIL # BLD: 0.2 K/UL (ref 0–0.6)
EOSINOPHIL NFR BLD: 3.9 %
GFR SERPLBLD CREATININE-BSD FMLA CKD-EPI: >90 ML/MIN/{1.73_M2}
GLUCOSE SERPL-MCNC: 103 MG/DL (ref 70–99)
GLUCOSE UR STRIP.AUTO-MCNC: NEGATIVE MG/DL
HCT VFR BLD AUTO: 38.8 % (ref 40.5–52.5)
HGB BLD-MCNC: 13.3 G/DL (ref 13.5–17.5)
HGB UR QL STRIP.AUTO: NEGATIVE
KETONES UR STRIP.AUTO-MCNC: NEGATIVE MG/DL
LEUKOCYTE ESTERASE UR QL STRIP.AUTO: NEGATIVE
LIPASE SERPL-CCNC: 35 U/L (ref 13–60)
LYMPHOCYTES # BLD: 1.6 K/UL (ref 1–5.1)
LYMPHOCYTES NFR BLD: 37.5 %
MAGNESIUM SERPL-MCNC: 1.87 MG/DL (ref 1.8–2.4)
MCH RBC QN AUTO: 31.7 PG (ref 26–34)
MCHC RBC AUTO-ENTMCNC: 34.4 G/DL (ref 31–36)
MCV RBC AUTO: 92.1 FL (ref 80–100)
MONOCYTES # BLD: 0.6 K/UL (ref 0–1.3)
MONOCYTES NFR BLD: 13.1 %
NEUTROPHILS # BLD: 1.8 K/UL (ref 1.7–7.7)
NEUTROPHILS NFR BLD: 43 %
NITRITE UR QL STRIP.AUTO: NEGATIVE
PH UR STRIP.AUTO: 6.5 [PH] (ref 5–8)
PLATELET # BLD AUTO: 202 K/UL (ref 135–450)
PMV BLD AUTO: 7.5 FL (ref 5–10.5)
POTASSIUM SERPL-SCNC: 3.3 MMOL/L (ref 3.5–5.1)
PROT SERPL-MCNC: 6.9 G/DL (ref 6.4–8.2)
PROT UR STRIP.AUTO-MCNC: NEGATIVE MG/DL
RBC # BLD AUTO: 4.21 M/UL (ref 4.2–5.9)
SODIUM SERPL-SCNC: 140 MMOL/L (ref 136–145)
SP GR UR STRIP.AUTO: <=1.005 (ref 1–1.03)
TROPONIN, HIGH SENSITIVITY: 30 NG/L (ref 0–22)
TROPONIN, HIGH SENSITIVITY: 33 NG/L (ref 0–22)
UA COMPLETE W REFLEX CULTURE PNL UR: NORMAL
UA DIPSTICK W REFLEX MICRO PNL UR: NORMAL
URN SPEC COLLECT METH UR: NORMAL
UROBILINOGEN UR STRIP-ACNC: 0.2 E.U./DL
WBC # BLD AUTO: 4.2 K/UL (ref 4–11)

## 2025-07-19 PROCEDURE — 85025 COMPLETE CBC W/AUTO DIFF WBC: CPT

## 2025-07-19 PROCEDURE — 2580000003 HC RX 258: Performed by: EMERGENCY MEDICINE

## 2025-07-19 PROCEDURE — 84484 ASSAY OF TROPONIN QUANT: CPT

## 2025-07-19 PROCEDURE — 74177 CT ABD & PELVIS W/CONTRAST: CPT

## 2025-07-19 PROCEDURE — 80053 COMPREHEN METABOLIC PANEL: CPT

## 2025-07-19 PROCEDURE — 6360000002 HC RX W HCPCS: Performed by: EMERGENCY MEDICINE

## 2025-07-19 PROCEDURE — 93005 ELECTROCARDIOGRAM TRACING: CPT | Performed by: EMERGENCY MEDICINE

## 2025-07-19 PROCEDURE — 2500000003 HC RX 250 WO HCPCS: Performed by: EMERGENCY MEDICINE

## 2025-07-19 PROCEDURE — 83735 ASSAY OF MAGNESIUM: CPT

## 2025-07-19 PROCEDURE — 81003 URINALYSIS AUTO W/O SCOPE: CPT

## 2025-07-19 PROCEDURE — 36415 COLL VENOUS BLD VENIPUNCTURE: CPT

## 2025-07-19 PROCEDURE — 6360000004 HC RX CONTRAST MEDICATION: Performed by: EMERGENCY MEDICINE

## 2025-07-19 PROCEDURE — 83690 ASSAY OF LIPASE: CPT

## 2025-07-19 RX ORDER — 0.9 % SODIUM CHLORIDE 0.9 %
500 INTRAVENOUS SOLUTION INTRAVENOUS ONCE
Status: COMPLETED | OUTPATIENT
Start: 2025-07-19 | End: 2025-07-19

## 2025-07-19 RX ORDER — IOPAMIDOL 755 MG/ML
100 INJECTION, SOLUTION INTRAVASCULAR
Status: COMPLETED | OUTPATIENT
Start: 2025-07-19 | End: 2025-07-19

## 2025-07-19 RX ORDER — FAMOTIDINE 20 MG/1
20 TABLET, FILM COATED ORAL 2 TIMES DAILY
Qty: 60 TABLET | Refills: 0 | Status: SHIPPED | OUTPATIENT
Start: 2025-07-19

## 2025-07-19 RX ADMIN — IOPAMIDOL 100 ML: 755 INJECTION, SOLUTION INTRAVENOUS at 17:15

## 2025-07-19 RX ADMIN — FAMOTIDINE 20 MG: 10 INJECTION, SOLUTION INTRAVENOUS at 16:58

## 2025-07-19 RX ADMIN — SODIUM CHLORIDE 500 ML: 0.9 INJECTION, SOLUTION INTRAVENOUS at 16:56

## 2025-07-19 NOTE — DISCHARGE INSTRUCTIONS
Per our discussion and while you are not having any chest pain your troponin test was abnormal.  That is concerning to us because it is not clear why your troponin is abnormal and we are very fearful that there could be something serious going on with your heart.  If you change your mind about being willing to be admitted you can return to any emergency departments and we would be happy to evaluate you again.

## 2025-07-19 NOTE — ED NOTES
Up to bathroom .   When back in room, reports some burning with urination.   EMD updated.    Epigastric and thoracic back pain still at 6.

## 2025-07-19 NOTE — ED PROVIDER NOTES
[facility] EMERGENCY DEPARTMENT     EMERGENCY DEPARTMENT ENCOUNTER            Pt Name: Carroll Mejia   MRN: [id number]   Birthdate 1970   Date of evaluation: 7/19/2025   Provider: Griffin Gonzalez MD   PCP: Jeny Conti MD   Note Started: 4:16 PM EDT 7/19/25          CHIEF COMPLAINT     Chief Complaint   Patient presents with    Abdominal Pain    Back Pain     Epigastric abd pain  at 6 constant for 1 week.    Intermittent thoracic area pain at 6 for 2 weeks.  No N/V/D.    Hx of alcohol abuse-no alcohol for 4 yrs.                 HISTORY OF PRESENT ILLNESS:   History from : Patient   Limitations to history : None     Carroll Mejia is a 55 y.o. male who presents c/o abd discomfort.    Chief Complaint  - \"Knot in my stomach that's sore\" for about a week  - Bulge \"over top of\" belly button  - Periodic pains in middle back for \"a couple of weeks\"  - History of blood in urine 1.5 months ago    History of Present Illness  Mr. Mejia presents with a knot in his stomach and soreness over his neck for about a week. He reports noticing a small bulge above his belly button for some time. The patient also complains of periodic pains in his middle back for a couple of weeks, though he is unsure if this is associated with the abdominal pain.    The patient denies fever, nausea, or vomiting. He reports an episode of thick blood in his urine about a month and a half ago, for which he consulted Urology. The urologist examined his bladder and found everything to be okay. Mr. Mejia denies any current issues with urination or bowel movements.    The patient's medical history is significant for diverticulitis, for which he had surgery to remove a 6-inch myelocytes. He takes several medications, including enalapril, simvastatin, a cholesterol pill, vitamin D, and metoprolol. Mr. Mejia is a current smoker but reports no alcohol consumption for the past 4 years.      Medical History  -

## 2025-07-19 NOTE — ED PROVIDER NOTES
Emergency Department Attending Physician Note  Location: [facility] EMERGENCY DEPARTMENT  7/19/2025       Pt Name: Carroll Mejia  MRN: [id number]  Birthdate 1970    Date of evaluation: 7/19/2025  Provider: Ari Leyva DO  PCP: Jeny Conti MD    Note Started: 3:35 PM EDT 7/19/25    CHIEF COMPLAINT  Chief Complaint   Patient presents with    Abdominal Pain    Back Pain     Epigastric abd pain  at 6 constant for 1 week.    Intermittent thoracic area pain at 6 for 2 weeks.  No N/V/D.    Hx of alcohol abuse-no alcohol for 4 yrs.           HISTORY OF PRESENT ILLNESS:  History obtained by patient. .     Carroll Mejia is a 55 y.o. male with a significant PMHx of alcohol abuse, depression, HLD, HTN, sleep apnea, diverticulitis presents today for evaluation of abdominal pain.    Patient reports a knot in his stomach over his navel for the last couple days.  Describes it as sore and painful to touch.  Also reports periodic pain in middle of his back that is not currently bothering him.  He reports previous large intestine surgery for diverticulitis several years ago.  He denies any current chest pain, shortness of breath, fever, nausea, vomiting, diarrhea.  He denies any trauma or inciting event to abdominal pain.  He denies any current alcohol or drug use and states he has been clean and sober for 4 years.  He reports some blood in his urine roughly 1 month ago which was evaluated at that time and he was scoped to make sure he did not have cancer.  He reports no blood in his stool.    Nursing Notes were all reviewed and agreed with or any disagreements were addressed in the HPI.      MEDICAL HISTORY  Past Medical History:   Diagnosis Date    Alcohol abuse     Centrilobular emphysema (HCC) 05/05/2023    Depression     Diverticulitis     Hyperlipidemia     Hypertension     Sleep apnea         SURGICAL HISTORY  Past Surgical History:   Procedure Laterality Date    COLECTOMY      HAND

## 2025-07-19 NOTE — ED NOTES
EMD to bedside and discussing test results including elevated troponin.   EMD recommending admission to hospital.

## 2025-07-20 ENCOUNTER — HOSPITAL ENCOUNTER (INPATIENT)
Age: 55
LOS: 1 days | Discharge: HOME | DRG: 392 | End: 2025-07-21
Attending: EMERGENCY MEDICINE
Payer: COMMERCIAL

## 2025-07-20 DIAGNOSIS — Z71.89 GOALS OF CARE, COUNSELING/DISCUSSION: ICD-10-CM

## 2025-07-20 DIAGNOSIS — R10.13 ABDOMINAL PAIN, EPIGASTRIC: ICD-10-CM

## 2025-07-20 DIAGNOSIS — R07.9 CHEST PAIN, UNSPECIFIED TYPE: ICD-10-CM

## 2025-07-20 DIAGNOSIS — R79.89 ELEVATED TROPONIN LEVEL: Primary | ICD-10-CM

## 2025-07-20 PROBLEM — R10.9 ABDOMINAL PAIN: Status: ACTIVE | Noted: 2025-07-20

## 2025-07-20 LAB
ALBUMIN SERPL-MCNC: 4 G/DL (ref 3.4–5)
ALP SERPL-CCNC: 44 U/L (ref 40–129)
ALT SERPL-CCNC: 42 U/L (ref 10–40)
ANION GAP SERPL CALCULATED.3IONS-SCNC: 12 MMOL/L (ref 3–16)
AST SERPL-CCNC: 43 U/L (ref 15–37)
BASOPHILS # BLD: 0 K/UL (ref 0–0.2)
BASOPHILS NFR BLD: 1 %
BILIRUB DIRECT SERPL-MCNC: 0.3 MG/DL (ref 0–0.3)
BILIRUB INDIRECT SERPL-MCNC: 0.3 MG/DL (ref 0–1)
BILIRUB SERPL-MCNC: 0.6 MG/DL (ref 0–1)
BILIRUB UR QL STRIP.AUTO: NEGATIVE
BUN SERPL-MCNC: 13 MG/DL (ref 7–20)
CALCIUM SERPL-MCNC: 9.3 MG/DL (ref 8.3–10.6)
CHLORIDE SERPL-SCNC: 104 MMOL/L (ref 99–110)
CLARITY UR: CLEAR
CO2 SERPL-SCNC: 24 MMOL/L (ref 21–32)
COLOR UR: YELLOW
CREAT SERPL-MCNC: 0.8 MG/DL (ref 0.9–1.3)
DEPRECATED RDW RBC AUTO: 14.1 % (ref 12.4–15.4)
EKG ATRIAL RATE: 73 BPM
EKG DIAGNOSIS: NORMAL
EKG P AXIS: 23 DEGREES
EKG P-R INTERVAL: 204 MS
EKG Q-T INTERVAL: 358 MS
EKG QRS DURATION: 94 MS
EKG QTC CALCULATION (BAZETT): 394 MS
EKG R AXIS: -34 DEGREES
EKG T AXIS: -10 DEGREES
EKG VENTRICULAR RATE: 73 BPM
EOSINOPHIL # BLD: 0.1 K/UL (ref 0–0.6)
EOSINOPHIL NFR BLD: 3.1 %
GFR SERPLBLD CREATININE-BSD FMLA CKD-EPI: >90 ML/MIN/{1.73_M2}
GLUCOSE SERPL-MCNC: 78 MG/DL (ref 70–99)
GLUCOSE UR STRIP.AUTO-MCNC: NEGATIVE MG/DL
HCT VFR BLD AUTO: 35.1 % (ref 40.5–52.5)
HGB BLD-MCNC: 12.5 G/DL (ref 13.5–17.5)
HGB UR QL STRIP.AUTO: NEGATIVE
KETONES UR STRIP.AUTO-MCNC: NEGATIVE MG/DL
LEUKOCYTE ESTERASE UR QL STRIP.AUTO: NEGATIVE
LIPASE SERPL-CCNC: 25 U/L (ref 13–60)
LYMPHOCYTES # BLD: 1.4 K/UL (ref 1–5.1)
LYMPHOCYTES NFR BLD: 38.1 %
MAGNESIUM SERPL-MCNC: 1.79 MG/DL (ref 1.8–2.4)
MCH RBC QN AUTO: 32.2 PG (ref 26–34)
MCHC RBC AUTO-ENTMCNC: 35.7 G/DL (ref 31–36)
MCV RBC AUTO: 90.2 FL (ref 80–100)
MONOCYTES # BLD: 0.4 K/UL (ref 0–1.3)
MONOCYTES NFR BLD: 10.4 %
NEUTROPHILS # BLD: 1.7 K/UL (ref 1.7–7.7)
NEUTROPHILS NFR BLD: 47.4 %
NITRITE UR QL STRIP.AUTO: NEGATIVE
PH UR STRIP.AUTO: 6 [PH] (ref 5–8)
PLATELET # BLD AUTO: 182 K/UL (ref 135–450)
PMV BLD AUTO: 8.3 FL (ref 5–10.5)
POTASSIUM SERPL-SCNC: 3.4 MMOL/L (ref 3.5–5.1)
PROT SERPL-MCNC: 7.2 G/DL (ref 6.4–8.2)
PROT UR STRIP.AUTO-MCNC: NEGATIVE MG/DL
RBC # BLD AUTO: 3.89 M/UL (ref 4.2–5.9)
SODIUM SERPL-SCNC: 140 MMOL/L (ref 136–145)
SP GR UR STRIP.AUTO: 1.02 (ref 1–1.03)
TROPONIN, HIGH SENSITIVITY: 34 NG/L (ref 0–22)
TROPONIN, HIGH SENSITIVITY: 35 NG/L (ref 0–22)
TROPONIN, HIGH SENSITIVITY: 41 NG/L (ref 0–22)
UA COMPLETE W REFLEX CULTURE PNL UR: NORMAL
UA DIPSTICK W REFLEX MICRO PNL UR: NORMAL
URN SPEC COLLECT METH UR: NORMAL
UROBILINOGEN UR STRIP-ACNC: 0.2 E.U./DL
WBC # BLD AUTO: 3.7 K/UL (ref 4–11)

## 2025-07-20 PROCEDURE — 81003 URINALYSIS AUTO W/O SCOPE: CPT

## 2025-07-20 PROCEDURE — 6370000000 HC RX 637 (ALT 250 FOR IP)

## 2025-07-20 PROCEDURE — 83690 ASSAY OF LIPASE: CPT

## 2025-07-20 PROCEDURE — 6370000000 HC RX 637 (ALT 250 FOR IP): Performed by: NURSE PRACTITIONER

## 2025-07-20 PROCEDURE — 36415 COLL VENOUS BLD VENIPUNCTURE: CPT

## 2025-07-20 PROCEDURE — 2500000003 HC RX 250 WO HCPCS

## 2025-07-20 PROCEDURE — 1200000000 HC SEMI PRIVATE

## 2025-07-20 PROCEDURE — 83735 ASSAY OF MAGNESIUM: CPT

## 2025-07-20 PROCEDURE — 93005 ELECTROCARDIOGRAM TRACING: CPT | Performed by: NURSE PRACTITIONER

## 2025-07-20 PROCEDURE — 99285 EMERGENCY DEPT VISIT HI MDM: CPT

## 2025-07-20 PROCEDURE — 84484 ASSAY OF TROPONIN QUANT: CPT

## 2025-07-20 PROCEDURE — 80048 BASIC METABOLIC PNL TOTAL CA: CPT

## 2025-07-20 PROCEDURE — 85025 COMPLETE CBC W/AUTO DIFF WBC: CPT

## 2025-07-20 PROCEDURE — 6360000002 HC RX W HCPCS

## 2025-07-20 PROCEDURE — 80076 HEPATIC FUNCTION PANEL: CPT

## 2025-07-20 RX ORDER — HYDROCHLOROTHIAZIDE 25 MG/1
25 TABLET ORAL DAILY
Status: DISCONTINUED | OUTPATIENT
Start: 2025-07-20 | End: 2025-07-21 | Stop reason: HOSPADM

## 2025-07-20 RX ORDER — POTASSIUM CHLORIDE 1500 MG/1
40 TABLET, EXTENDED RELEASE ORAL PRN
Status: DISCONTINUED | OUTPATIENT
Start: 2025-07-20 | End: 2025-07-21 | Stop reason: HOSPADM

## 2025-07-20 RX ORDER — ENOXAPARIN SODIUM 100 MG/ML
40 INJECTION SUBCUTANEOUS DAILY
Status: DISCONTINUED | OUTPATIENT
Start: 2025-07-20 | End: 2025-07-21 | Stop reason: HOSPADM

## 2025-07-20 RX ORDER — ASPIRIN 81 MG/1
324 TABLET, CHEWABLE ORAL ONCE
Status: COMPLETED | OUTPATIENT
Start: 2025-07-20 | End: 2025-07-20

## 2025-07-20 RX ORDER — FAMOTIDINE 20 MG/1
20 TABLET, FILM COATED ORAL ONCE
Status: COMPLETED | OUTPATIENT
Start: 2025-07-20 | End: 2025-07-20

## 2025-07-20 RX ORDER — SODIUM CHLORIDE 0.9 % (FLUSH) 0.9 %
5-40 SYRINGE (ML) INJECTION PRN
Status: DISCONTINUED | OUTPATIENT
Start: 2025-07-20 | End: 2025-07-21 | Stop reason: HOSPADM

## 2025-07-20 RX ORDER — ACETAMINOPHEN 650 MG/1
650 SUPPOSITORY RECTAL EVERY 6 HOURS PRN
Status: DISCONTINUED | OUTPATIENT
Start: 2025-07-20 | End: 2025-07-21 | Stop reason: HOSPADM

## 2025-07-20 RX ORDER — LOSARTAN POTASSIUM 100 MG/1
100 TABLET ORAL DAILY
Status: DISCONTINUED | OUTPATIENT
Start: 2025-07-20 | End: 2025-07-21 | Stop reason: HOSPADM

## 2025-07-20 RX ORDER — SODIUM CHLORIDE 0.9 % (FLUSH) 0.9 %
5-40 SYRINGE (ML) INJECTION EVERY 12 HOURS SCHEDULED
Status: DISCONTINUED | OUTPATIENT
Start: 2025-07-20 | End: 2025-07-21 | Stop reason: HOSPADM

## 2025-07-20 RX ORDER — ACETAMINOPHEN 325 MG/1
650 TABLET ORAL EVERY 6 HOURS PRN
Status: DISCONTINUED | OUTPATIENT
Start: 2025-07-20 | End: 2025-07-21 | Stop reason: HOSPADM

## 2025-07-20 RX ORDER — POLYETHYLENE GLYCOL 3350 17 G/17G
17 POWDER, FOR SOLUTION ORAL DAILY PRN
Status: DISCONTINUED | OUTPATIENT
Start: 2025-07-20 | End: 2025-07-21 | Stop reason: HOSPADM

## 2025-07-20 RX ORDER — LOSARTAN POTASSIUM AND HYDROCHLOROTHIAZIDE 25; 100 MG/1; MG/1
1 TABLET ORAL DAILY
Status: DISCONTINUED | OUTPATIENT
Start: 2025-07-20 | End: 2025-07-20

## 2025-07-20 RX ORDER — SODIUM CHLORIDE 9 MG/ML
INJECTION, SOLUTION INTRAVENOUS PRN
Status: DISCONTINUED | OUTPATIENT
Start: 2025-07-20 | End: 2025-07-21 | Stop reason: HOSPADM

## 2025-07-20 RX ORDER — ONDANSETRON 4 MG/1
4 TABLET, ORALLY DISINTEGRATING ORAL EVERY 8 HOURS PRN
Status: DISCONTINUED | OUTPATIENT
Start: 2025-07-20 | End: 2025-07-21 | Stop reason: HOSPADM

## 2025-07-20 RX ORDER — AMLODIPINE BESYLATE 5 MG/1
10 TABLET ORAL DAILY
Status: DISCONTINUED | OUTPATIENT
Start: 2025-07-20 | End: 2025-07-21 | Stop reason: HOSPADM

## 2025-07-20 RX ORDER — DICYCLOMINE HYDROCHLORIDE 10 MG/1
10 CAPSULE ORAL ONCE
Status: COMPLETED | OUTPATIENT
Start: 2025-07-20 | End: 2025-07-20

## 2025-07-20 RX ORDER — MAGNESIUM SULFATE IN WATER 40 MG/ML
2000 INJECTION, SOLUTION INTRAVENOUS PRN
Status: DISCONTINUED | OUTPATIENT
Start: 2025-07-20 | End: 2025-07-21 | Stop reason: HOSPADM

## 2025-07-20 RX ORDER — POTASSIUM CHLORIDE 7.45 MG/ML
10 INJECTION INTRAVENOUS PRN
Status: DISCONTINUED | OUTPATIENT
Start: 2025-07-20 | End: 2025-07-21 | Stop reason: HOSPADM

## 2025-07-20 RX ORDER — BUPROPION HYDROCHLORIDE 150 MG/1
150 TABLET ORAL EVERY MORNING
Status: DISCONTINUED | OUTPATIENT
Start: 2025-07-20 | End: 2025-07-21 | Stop reason: HOSPADM

## 2025-07-20 RX ORDER — ONDANSETRON 2 MG/ML
4 INJECTION INTRAMUSCULAR; INTRAVENOUS EVERY 6 HOURS PRN
Status: DISCONTINUED | OUTPATIENT
Start: 2025-07-20 | End: 2025-07-21 | Stop reason: HOSPADM

## 2025-07-20 RX ORDER — FAMOTIDINE 20 MG/1
20 TABLET, FILM COATED ORAL 2 TIMES DAILY
Status: DISCONTINUED | OUTPATIENT
Start: 2025-07-20 | End: 2025-07-21 | Stop reason: HOSPADM

## 2025-07-20 RX ADMIN — SODIUM CHLORIDE, PRESERVATIVE FREE 10 ML: 5 INJECTION INTRAVENOUS at 21:01

## 2025-07-20 RX ADMIN — FAMOTIDINE 20 MG: 20 TABLET, FILM COATED ORAL at 15:37

## 2025-07-20 RX ADMIN — ENOXAPARIN SODIUM 40 MG: 100 INJECTION SUBCUTANEOUS at 15:37

## 2025-07-20 RX ADMIN — BUPROPION HYDROCHLORIDE 150 MG: 150 TABLET, EXTENDED RELEASE ORAL at 15:37

## 2025-07-20 RX ADMIN — DICYCLOMINE HYDROCHLORIDE 10 MG: 10 CAPSULE ORAL at 09:33

## 2025-07-20 RX ADMIN — ASPIRIN 324 MG: 81 TABLET, CHEWABLE ORAL at 09:32

## 2025-07-20 RX ADMIN — FAMOTIDINE 20 MG: 20 TABLET, FILM COATED ORAL at 09:32

## 2025-07-20 RX ADMIN — SODIUM CHLORIDE, PRESERVATIVE FREE 10 ML: 5 INJECTION INTRAVENOUS at 21:04

## 2025-07-20 RX ADMIN — FAMOTIDINE 20 MG: 20 TABLET, FILM COATED ORAL at 21:01

## 2025-07-20 ASSESSMENT — PAIN DESCRIPTION - LOCATION: LOCATION: ABDOMEN;BACK

## 2025-07-20 ASSESSMENT — LIFESTYLE VARIABLES
HOW OFTEN DO YOU HAVE A DRINK CONTAINING ALCOHOL: MONTHLY OR LESS
HOW MANY STANDARD DRINKS CONTAINING ALCOHOL DO YOU HAVE ON A TYPICAL DAY: 1 OR 2

## 2025-07-20 ASSESSMENT — HEART SCORE: ECG: NON-SPECIFC REPOLARIZATION DISTURBANCE/LBTB/PM

## 2025-07-20 ASSESSMENT — PAIN - FUNCTIONAL ASSESSMENT: PAIN_FUNCTIONAL_ASSESSMENT: 0-10

## 2025-07-20 ASSESSMENT — PAIN SCALES - GENERAL: PAINLEVEL_OUTOF10: 6

## 2025-07-20 NOTE — ED NOTES
Pt from triage to room 46 at this time. Report received from Miya RIDDLE. Pt placed on cardiac monitor and provided warm blankets at this time. Denies any other needs. Call light in reach of pt and instructed how to use.

## 2025-07-20 NOTE — H&P
Name:  Carroll Mejia /Age/Sex: 1970  (55 y.o. male)   MRN & CSN:  [id number] & [id number] Encounter Date/Time: 2025 12:30 PM EDT   Location:  [address] [place] [address] PCP: Jeny Conti MD     Attending:Beatriz Lino MD       Carroll Mejia is a 55 y.o. male who presented with     Chief Complaint   Patient presents with    Abdominal Pain     Pt to ed c/o chest pain stating he was seen yesterday left AMA wants to be admitted now.           Assessment and Plan     Assessment:    Epigastric abdominal pain  Diverticulosis  Elevated troponin  Essential hypertension  Hypokalemia  Hypomagnesemia  Elevated LFT  Hyperlipidemia  Alcohol abuse  Depression  Sleep apnea  Dysuria    Plan:      Patient presented to ER yesterday with chief complaints of epigastric abdominal pain was recommended to get admitted but left AMA and returned today. CT abdomen pelvis suggestive of diverticulosis. Lipase normal. Afebrile, no leukocytosis.  Can be related to gastritis vs cardiac etiology, started on Pepcid.     Troponin found to be elevated, multiple risk factors, complaints of intermittent left sided chest pain. EKG with 1st degree AV block and incomplete right BBB. Monitor on telemetry. NPO after midnight. Cardiology consulted for further risk stratification    LFT chronically elevated likely due to alcoholic hepatitis, monitor with CMP in AM. Patient reports no recent alcohol abuse, no concerns of withdrawal    Patient complaining of dysuria, UA negative for UTI. Monitor off antibiotics    Electrolyte replacement as needed    Continue home medications for chronic conditions except metoprolol as heart rate is around 60's.     HPI :      Carroll Mejia is a 55 y.o. male with  has a past medical history of Alcohol abuse, Centrilobular emphysema (HCC), Depression, Diverticulitis, Hyperlipidemia, Hypertension, and Sleep apnea. who presented with chief complaints of epigastric abdominal pain. Patient reports going on off

## 2025-07-20 NOTE — ED PROVIDER NOTES
Social History     Socioeconomic History    Marital status: Legally      Spouse name: None    Number of children: 2    Years of education: None    Highest education level: None   Tobacco Use    Smoking status: Every Day     Current packs/day: 0.50     Average packs/day: 0.5 packs/day for 20.0 years (10.0 ttl pk-yrs)     Types: Cigarettes     Passive exposure: Current    Smokeless tobacco: Never    Tobacco comments:     5 cigs per day   Vaping Use    Vaping status: Never Used   Substance and Sexual Activity    Alcohol use: Not Currently     Comment: 7/19/25   no alcohol for 4 yrs      (60-70 ounces of beer per day in the past)    Drug use: No    Sexual activity: Yes     Social Drivers of Health     Financial Resource Strain: Low Risk  (9/10/2024)    Overall Financial Resource Strain (CARDIA)     Difficulty of Paying Living Expenses: Not hard at all   Food Insecurity: No Food Insecurity (3/3/2025)    Hunger Vital Sign     Worried About Running Out of Food in the Last Year: Never true     Ran Out of Food in the Last Year: Never true   Transportation Needs: No Transportation Needs (3/3/2025)    PRAPARE - Transportation     Lack of Transportation (Medical): No     Lack of Transportation (Non-Medical): No   Physical Activity: Not on File (8/24/2019)    Received from [facility]    Physical Activity     Physical Activity: 0   Stress: Not on File (8/24/2019)    Received from [facility]    Stress     Stress: 0   Social Connections: Not on File (8/24/2019)    Received from [facility]    Social Connections     Social Connections and Isolation: 0    Received from [facility], [facility]    Interpersonal Safety   Housing Stability: Low Risk  (3/3/2025)    Housing Stability Vital Sign     Unable to Pay for Housing in the Last Year: No     Number of Times Moved in the Last Year: 0     Homeless in the Last Year: No       Past Surgical History:   Procedure 
Kayla presents emergency department for evaluation status post he was seen at [facility] on yesterday and had an elevated heart score secondary was obtained after he had epigastric abdominal pain.  Patient had elevated troponins and the EMD wanted to admit him at that time but due to work issues the patient was not able to stay for admission.  He is representing today for admission for chest pain rule out.  His troponin levels were again elevated slightly above the elevation on yesterday, no ischemic EKG changes, with anemia and leukopenia present.  Minimally reduced magnesium at 1.79, minimally reduced potassium at 3.4, ALT and AST slightly elevated at 42 and 43, no UTI.  Consider obtaining CXR given patient had CXR yesterday this imaging modality was foregone.  Patient has elevated heart score@5 placing him in the low/moderate risk category for major adverse cardiac events within the next 6 weeks and he will therefore require admission for chest pain rule out.  Discussed with patient intention to admit.  Patient agreeable to plan for admission.  We discussed goals of care and patient will be a full code.    Disposition Considerations (tests considered but not done, Admit vs D/C, Shared Decision Making, Pt Expectation of Test or Tx.): Inappropriate for outpatient management      I am the Primary Clinician of Record.  FINAL IMPRESSION        ICD-10-CM    1. Elevated troponin level  R79.89       2. Abdominal pain, epigastric  R10.13       3. Goals of care, counseling/discussion  Z71.89     Full code           DISPOSITION/PLAN     DISPOSITION   Admitted      Patient will be admitted to hospital for further evaluation and treatment.       Hospitalist: Dr. Lino      Discussed patients HPI, ED work-up, results, treatment, and response with my attending physician Dr. Escalante and the Hospitalist -Dr. Lino who agrees to admit the patient to the hospital.               (Please note that portions of this note were

## 2025-07-20 NOTE — ED NOTES
ED TO INPATIENT SBAR HANDOFF    Patient Name: Carroll Mejia   Preferred Name: Carroll  : 1970  55 y.o.   Family/Caregiver Present: no   Code Status Order: Full Code  PO Status: NPO:No  Telemetry Order: Yes  C-SSRS: Risk of Suicide: No Risk  Sitter no   Restraints:     Sepsis Risk Score Sepsis V2 Risk Score: 4.4    Situation  Chief Complaint   Patient presents with    Abdominal Pain     Pt to ed c/o chest pain stating he was seen yesterday left AMA wants to be admitted now.      Brief Description of Patient's Condition: Pt left AMA yesterday after recommendation for admission due to elevated troponin because he had to go to work. Pt came in this afternoon to be admitted and follow up on treatment. Pt is A&Ox4, independent with a steady gait.   Mental Status: oriented, alert, coherent, logical, and thought processes intact  Arrived from:Home  Imaging:   No orders to display     Abnormal labs:   Abnormal Labs Reviewed   TROPONIN - Abnormal; Notable for the following components:       Result Value    Troponin, High Sensitivity 34 (*)     All other components within normal limits   TROPONIN - Abnormal; Notable for the following components:    Troponin, High Sensitivity 41 (*)     All other components within normal limits   CBC WITH AUTO DIFFERENTIAL - Abnormal; Notable for the following components:    WBC 3.7 (*)     RBC 3.89 (*)     Hemoglobin 12.5 (*)     Hematocrit 35.1 (*)     All other components within normal limits   HEPATIC FUNCTION PANEL - Abnormal; Notable for the following components:    ALT 42 (*)     AST 43 (*)     All other components within normal limits   BASIC METABOLIC PANEL W/ REFLEX TO MG FOR LOW K - Abnormal; Notable for the following components:    Potassium reflex Magnesium 3.4 (*)     Creatinine 0.8 (*)     All other components within normal limits   MAGNESIUM - Abnormal; Notable for the following components:    Magnesium 1.79 (*)     All other components within normal limits  Ovidio Reyes, 65 year old male, is requesting a refill for Zolpidem, last refilled on 16.     There has not been any change in condition. Patient's last appointment was 16, there is not an appointment scheduled.    Narcotics agreement needs update.    Patient's preferred pharmacy has been confirmed.    Medication:   Current Outpatient Prescriptions   Medication Sig   • zolpidem (AMBIEN) 10 MG tablet Take 1 tablet by mouth nightly as needed for Sleep.   • traMADOL (ULTRAM) 50 MG tablet Take 1 tablet by mouth every 6 hours as needed for Pain.   • ALPRAZolam (XANAX) 0.25 MG tablet Take 1 tablet by mouth 2 times daily as needed for Sleep or Anxiety.   • albuterol 108 (90 BASE) MCG/ACT inhaler Inhale 2 puffs into the lungs four times daily.   • Multiple Vitamin (MULTI-VITAMIN DAILY PO) Take  by mouth daily.     No current facility-administered medications for this visit.        Allergies: ALLERGIES:  No Known Allergies    Last 3 Month Labs: No visits with results within 3 Month(s) from this visit.  Latest known visit with results is:    Admission on 2016, Discharged on 09/15/2016   Component Date Value Ref Range Status   • Pathology Report 2016    Final                    Value:Name: OVIDIO REYES             MRN:     [id number]    /Age:1951 (Age: 64)           Visit#:  [id number]    Sex: M                        Pathology Report        Client: [facility]        Submitting Physician: Betty Rose MD        Additional Physician(s): Vlad Rich MD        Date Specimen Collected: 16           Accession #:  [id number]    Date Specimen Received:  16           Requisition    #:[id number]    Date Reported:           2016 13:00   Location:     [facility]        ______________________________________________________________________________    Pathologic Diagnosis :    Colon, sigmoid and distal and proximal anastomotic rings,  segmental resection:    -  Diverticular disease    -  Viable resected margin    -  Viable anastomotic rings        Nikole Thompson MD    ** Electronic Signature (MAR) 9/14/2016 13:00 **    ______________________________________________________________________________                                  Clinical Information:    REASON FOR TISSUE SUBMISSION:DIVERTICULITIS OF LARGE INTESTINE WITHOUT    PERFORATION OR ABSCESS WITH OUT BLEEDING    SPECIMEN A DESCRIPTION:SIGMOID COLON OPEN END PROXIMAL, STAPLED ON DISTAL END    PROXIMAL AND DISTAL ANASTOMOTIC RINGS    SPECIMEN A SUBMITTED:FORMALIN    TIME SPECIMEN A OBTAINED:0938        Specimen(s) Submitted:     SIGMOID COLON OPEN END PROXIMAL, STAPLED ON DISTAL END PROXIMAL AND DISTAL    ANASTOMOTIC RINGS        Gross Description:    Specimen Labeled:  Properly labeled with the patient's name and \"GR-sigmoid    colon open end proximal stapled distal end proximal and distal anastomosis    rings\"    Fixative: Formalin    Specimen Received: Colon    Length: 10.2 cm    Diameter: 3.8 cm    Pericolonic fat: Moderate    Serosa: Tan red and glistening    Perforation: None    Mucosa: Tan with normal folds and numerous diverticula    Wall Thickness: 0.8 cm    Additional margins: Two tan stapled annular fragments of grossly unremarkable    derrick                          wel are also received. They are 2.3 and 2.4 cm in greatest dimension.        Representative Sections:      1- resection margin en face-cut toward true margin    2- opposite side resection margin en face-cut toward true margin    3-4- diverticula    4-3-ikazikdgfsd rings        [facility] 9/12/2016 04:06 PM            Microscopic Description:    Representative sections of the resected margin demonstrate viable bowel wall.     Representative sections of the colon demonstrate evidence of diverticular    disease.  There is slight thickening of muscularis propria, features    suggestive of chronicity present.  Representative  sections of anastomotic    rings demonstrate viable bowel wall.  No evidence of malignancy is identified.        MAR/mar 09/14/16        Fee Codes:     A: [id number], [id number]        Performing Lab Location (Unless otherwise specified):    [address], [facility] [address]       • Sodium 09/13/2016 143  135 - 145 mmol/L Final   • Potassium 09/13/2016 4.4  3.4 - 5.1 mmol/L Final   • Chloride 09/13/2016 108* 98 - 107 mmol/L Final   • Carbon Dioxide 09/13/2016 30  21 - 32 mmol/L Final   • Anion Gap 09/13/2016 9* 10 - 20 mmol/L Final   • Glucose 09/13/2016 134* 65 - 99 mg/dL Final   • BUN 09/13/2016 10  10 - 20 mg/dL Final   • Creatinine 09/13/2016 1.02  0.67 - 1.17 mg/dL Final   • GFR Estimate,  09/13/2016 90   Final   • GFR Estimate, Non  09/13/2016 78   Final   • BUN/Creatinine Ratio 09/13/2016 10  7 - 25 Final   • CALCIUM 09/13/2016 7.9* 8.4 - 10.2 mg/dL Final   • TOTAL BILIRUBIN 09/13/2016 0.9  0.2 - 1.0 mg/dL Final   • AST/SGOT 09/13/2016 13  <38 Units/L Final   • ALT/SGPT 09/13/2016 25  <79 Units/L Final   • ALK PHOSPHATASE 09/13/2016 27* 45 - 117 Units/L Final   • TOTAL PROTEIN 09/13/2016 5.5* 6.4 - 8.2 g/dL Final   • Albumin 09/13/2016 2.9* 3.4 - 5.0 g/dL Final   • GLOBULIN 09/13/2016 2.6  2.0 - 4.0 g/dL Final   • A/G Ratio, Serum 09/13/2016 1.1  1.0 - 2.4 Final   • WBC 09/13/2016 15.5* 4.2 - 11.0 K/mcL Final   • RBC 09/13/2016 3.77* 4.50 - 5.90 mil/mcL Final   • HGB 09/13/2016 11.7* 13.0 - 17.0 g/dL Final   • HCT 09/13/2016 34.9* 39.0 - 51.0 % Final   • MCV 09/13/2016 92.6  78.0 - 100.0 fl Final   • MCH 09/13/2016 31.0  26.0 - 34.0 pg Final   • MCHC 09/13/2016 33.5  32.0 - 36.5 g/dL Final   • RDW-CV 09/13/2016 12.7  11.0 - 15.0 % Final   • PLT 09/13/2016 211  140 - 450 K/mcL Final   • DIFF TYPE 09/13/2016 AUTOMATED DIFFERENTIAL   Final   • Neutrophil 09/13/2016 81  % Final   • LYMPH 09/13/2016 10  % Final   • MONO 09/13/2016 9  % Final   •  EOSIN 09/13/2016 0  % Final   • BASO 09/13/2016 0  % Final   • Absolute Neutrophil 09/13/2016 12.5* 1.8 - 7.7 K/mcL Final   • Absolute Lymph 09/13/2016 1.6  1.0 - 4.0 K/mcL Final   • Absolute Mono 09/13/2016 1.4* 0.3 - 0.9 K/mcL Final   • Absolute Eos 09/13/2016 0.0* 0.1 - 0.5 K/mcL Final   • Absolute Baso 09/13/2016 0.0  0.0 - 0.3 K/mcL Final   • Sodium 09/14/2016 144  135 - 145 mmol/L Final   • Potassium 09/14/2016 4.3  3.4 - 5.1 mmol/L Final   • Chloride 09/14/2016 110* 98 - 107 mmol/L Final   • Carbon Dioxide 09/14/2016 29  21 - 32 mmol/L Final   • Anion Gap 09/14/2016 9* 10 - 20 mmol/L Final   • Glucose 09/14/2016 102* 65 - 99 mg/dL Final   • BUN 09/14/2016 6* 10 - 20 mg/dL Final   • Creatinine 09/14/2016 0.96  0.67 - 1.17 mg/dL Final   • GFR Estimate,  09/14/2016 >90   Final   • GFR Estimate, Non  09/14/2016 84   Final   • BUN/Creatinine Ratio 09/14/2016 6* 7 - 25 Final   • CALCIUM 09/14/2016 8.3* 8.4 - 10.2 mg/dL Final   • WBC 09/14/2016 8.9  4.2 - 11.0 K/mcL Final   • RBC 09/14/2016 3.70* 4.50 - 5.90 mil/mcL Final   • HGB 09/14/2016 11.3* 13.0 - 17.0 g/dL Final   • HCT 09/14/2016 34.2* 39.0 - 51.0 % Final   • MCV 09/14/2016 92.4  78.0 - 100.0 fl Final   • MCH 09/14/2016 30.5  26.0 - 34.0 pg Final   • MCHC 09/14/2016 33.0  32.0 - 36.5 g/dL Final   • RDW-CV 09/14/2016 12.8  11.0 - 15.0 % Final   • PLT 09/14/2016 216  140 - 450 K/mcL Final   • DIFF TYPE 09/14/2016 AUTOMATED DIFFERENTIAL   Final   • Neutrophil 09/14/2016 68  % Final   • LYMPH 09/14/2016 20  % Final   • MONO 09/14/2016 10  % Final   • EOSIN 09/14/2016 2  % Final   • BASO 09/14/2016 0  % Final   • Absolute Neutrophil 09/14/2016 6.0  1.8 - 7.7 K/mcL Final   • Absolute Lymph 09/14/2016 1.8  1.0 - 4.0 K/mcL Final   • Absolute Mono 09/14/2016 0.9  0.3 - 0.9 K/mcL Final   • Absolute Eos 09/14/2016 0.2  0.1 - 0.5 K/mcL Final   • Absolute Baso 09/14/2016 0.0  0.0 - 0.3 K/mcL Final

## 2025-07-20 NOTE — PROGRESS NOTES
4 Eyes Skin Assessment     NAME:  Carroll Mejia  YOB: 1970  MEDICAL RECORD NUMBER:  [id number]    The patient is being assessed for  Admission    I agree that at least one RN has performed a thorough Head to Toe Skin Assessment on the patient. ALL assessment sites listed below have been assessed.      Areas assessed by both nurses:    Head, Face, Ears, Shoulders, Back, Chest, Arms, Elbows, Hands, Sacrum. Buttock, Coccyx, Ischium, Legs. Feet and Heels, and Under Medical Devices         Does the Patient have a Wound? No noted wound(s)       Isaias Prevention initiated by RN: No  Wound Care Orders initiated by RN: No    For hospital-acquired stage 1 & 2 and ALL Stage 3,4, Unstageable, DTI, NWPT, and Complex wounds: place order “IP Wound Care/Ostomy Nurse Eval and Treat” by RN under : No    New Ostomies, if present place, Ostomy referral order under : No     Nurse 1 eSignature: Electronically signed by Marilee Carlton RN on 7/20/25 at 1:24 PM EDT    **SHARE this note so that the co-signing nurse can place an eSignature**    Nurse 2 eSignature: Electronically signed by Sonya Geiger RN on 7/20/25 at 1:29 PM EDT

## 2025-07-21 ENCOUNTER — APPOINTMENT (OUTPATIENT)
Dept: NUCLEAR MEDICINE | Age: 55
DRG: 392 | End: 2025-07-21
Payer: COMMERCIAL

## 2025-07-21 ENCOUNTER — APPOINTMENT (OUTPATIENT)
Age: 55
DRG: 392 | End: 2025-07-21
Payer: COMMERCIAL

## 2025-07-21 VITALS
BODY MASS INDEX: 27.65 KG/M2 | OXYGEN SATURATION: 99 % | HEIGHT: 69 IN | HEART RATE: 60 BPM | DIASTOLIC BLOOD PRESSURE: 83 MMHG | WEIGHT: 186.7 LBS | SYSTOLIC BLOOD PRESSURE: 135 MMHG | RESPIRATION RATE: 16 BRPM | TEMPERATURE: 97.7 F

## 2025-07-21 LAB
ANION GAP SERPL CALCULATED.3IONS-SCNC: 10 MMOL/L (ref 3–16)
BASOPHILS # BLD: 0 K/UL (ref 0–0.2)
BASOPHILS NFR BLD: 1.1 %
BUN SERPL-MCNC: 17 MG/DL (ref 7–20)
CALCIUM SERPL-MCNC: 8.8 MG/DL (ref 8.3–10.6)
CHLORIDE SERPL-SCNC: 105 MMOL/L (ref 99–110)
CO2 SERPL-SCNC: 26 MMOL/L (ref 21–32)
CREAT SERPL-MCNC: 0.7 MG/DL (ref 0.9–1.3)
DEPRECATED RDW RBC AUTO: 13.9 % (ref 12.4–15.4)
EKG ATRIAL RATE: 61 BPM
EKG ATRIAL RATE: 73 BPM
EKG DIAGNOSIS: NORMAL
EKG DIAGNOSIS: NORMAL
EKG P AXIS: 22 DEGREES
EKG P AXIS: 23 DEGREES
EKG P-R INTERVAL: 204 MS
EKG P-R INTERVAL: 212 MS
EKG Q-T INTERVAL: 358 MS
EKG Q-T INTERVAL: 392 MS
EKG QRS DURATION: 94 MS
EKG QRS DURATION: 94 MS
EKG QTC CALCULATION (BAZETT): 394 MS
EKG QTC CALCULATION (BAZETT): 394 MS
EKG R AXIS: -26 DEGREES
EKG R AXIS: -34 DEGREES
EKG T AXIS: -10 DEGREES
EKG T AXIS: -7 DEGREES
EKG VENTRICULAR RATE: 61 BPM
EKG VENTRICULAR RATE: 73 BPM
EOSINOPHIL # BLD: 0.2 K/UL (ref 0–0.6)
EOSINOPHIL NFR BLD: 4.4 %
GFR SERPLBLD CREATININE-BSD FMLA CKD-EPI: >90 ML/MIN/{1.73_M2}
GLUCOSE SERPL-MCNC: 111 MG/DL (ref 70–99)
HCT VFR BLD AUTO: 35.6 % (ref 40.5–52.5)
HGB BLD-MCNC: 12.7 G/DL (ref 13.5–17.5)
LYMPHOCYTES # BLD: 1.9 K/UL (ref 1–5.1)
LYMPHOCYTES NFR BLD: 52.5 %
MAGNESIUM SERPL-MCNC: 1.9 MG/DL (ref 1.8–2.4)
MCH RBC QN AUTO: 31.7 PG (ref 26–34)
MCHC RBC AUTO-ENTMCNC: 35.8 G/DL (ref 31–36)
MCV RBC AUTO: 88.7 FL (ref 80–100)
MONOCYTES # BLD: 0.4 K/UL (ref 0–1.3)
MONOCYTES NFR BLD: 11.3 %
NEUTROPHILS # BLD: 1.1 K/UL (ref 1.7–7.7)
NEUTROPHILS NFR BLD: 30.7 %
NUC REST DIASTOLIC VOLUME INDEX: 120 ML/M2
NUC REST EJECTION FRACTION: 67 %
NUC REST SYSTOLIC VOLUME INDEX: 40 ML/M2
PLATELET # BLD AUTO: 186 K/UL (ref 135–450)
PMV BLD AUTO: 8.1 FL (ref 5–10.5)
POTASSIUM SERPL-SCNC: 3.5 MMOL/L (ref 3.5–5.1)
RBC # BLD AUTO: 4.02 M/UL (ref 4.2–5.9)
SODIUM SERPL-SCNC: 141 MMOL/L (ref 136–145)
STRESS BASELINE DIAS BP: 83 MMHG
STRESS BASELINE HR: 59 BPM
STRESS BASELINE SYS BP: 135 MMHG
STRESS ESTIMATED WORKLOAD: 1.5 METS
STRESS PEAK DIAS BP: 84 MMHG
STRESS PEAK SYS BP: 150 MMHG
STRESS PERCENT HR ACHIEVED: 70 %
STRESS POST PEAK HR: 117 BPM
STRESS RATE PRESSURE PRODUCT: NORMAL BPM*MMHG
STRESS TARGET HR: 166 BPM
WBC # BLD AUTO: 3.6 K/UL (ref 4–11)

## 2025-07-21 PROCEDURE — 78452 HT MUSCLE IMAGE SPECT MULT: CPT | Performed by: INTERNAL MEDICINE

## 2025-07-21 PROCEDURE — 83735 ASSAY OF MAGNESIUM: CPT

## 2025-07-21 PROCEDURE — 93010 ELECTROCARDIOGRAM REPORT: CPT | Performed by: STUDENT IN AN ORGANIZED HEALTH CARE EDUCATION/TRAINING PROGRAM

## 2025-07-21 PROCEDURE — 6360000002 HC RX W HCPCS: Performed by: INTERNAL MEDICINE

## 2025-07-21 PROCEDURE — 93017 CV STRESS TEST TRACING ONLY: CPT

## 2025-07-21 PROCEDURE — 93018 CV STRESS TEST I&R ONLY: CPT | Performed by: INTERNAL MEDICINE

## 2025-07-21 PROCEDURE — 36415 COLL VENOUS BLD VENIPUNCTURE: CPT

## 2025-07-21 PROCEDURE — 94760 N-INVAS EAR/PLS OXIMETRY 1: CPT

## 2025-07-21 PROCEDURE — A9502 TC99M TETROFOSMIN: HCPCS | Performed by: INTERNAL MEDICINE

## 2025-07-21 PROCEDURE — 80048 BASIC METABOLIC PNL TOTAL CA: CPT

## 2025-07-21 PROCEDURE — 6370000000 HC RX 637 (ALT 250 FOR IP)

## 2025-07-21 PROCEDURE — 93016 CV STRESS TEST SUPVJ ONLY: CPT | Performed by: INTERNAL MEDICINE

## 2025-07-21 PROCEDURE — 78452 HT MUSCLE IMAGE SPECT MULT: CPT

## 2025-07-21 PROCEDURE — 3430000000 HC RX DIAGNOSTIC RADIOPHARMACEUTICAL: Performed by: INTERNAL MEDICINE

## 2025-07-21 PROCEDURE — 85025 COMPLETE CBC W/AUTO DIFF WBC: CPT

## 2025-07-21 RX ORDER — REGADENOSON 0.08 MG/ML
0.4 INJECTION, SOLUTION INTRAVENOUS
Status: COMPLETED | OUTPATIENT
Start: 2025-07-21 | End: 2025-07-21

## 2025-07-21 RX ADMIN — REGADENOSON 0.4 MG: 0.08 INJECTION, SOLUTION INTRAVENOUS at 13:47

## 2025-07-21 RX ADMIN — TETROFOSMIN 13 MILLICURIE: 1.38 INJECTION, POWDER, LYOPHILIZED, FOR SOLUTION INTRAVENOUS at 12:57

## 2025-07-21 RX ADMIN — TETROFOSMIN 33 MILLICURIE: 1.38 INJECTION, POWDER, LYOPHILIZED, FOR SOLUTION INTRAVENOUS at 13:50

## 2025-07-21 RX ADMIN — BUPROPION HYDROCHLORIDE 150 MG: 150 TABLET, EXTENDED RELEASE ORAL at 08:59

## 2025-07-21 RX ADMIN — FAMOTIDINE 20 MG: 20 TABLET, FILM COATED ORAL at 08:59

## 2025-07-21 ASSESSMENT — PAIN SCALES - GENERAL: PAINLEVEL_OUTOF10: 0

## 2025-07-21 NOTE — PROGRESS NOTES
V2.0    [facility] Progress Note      Name:  Carroll Mejia /Age/Sex: 1970  (55 y.o. male)   MRN & CSN:  [id number] & [id number] Encounter Date/Time: 2025 1:40 PM EDT   Location:  [id number] PCP: Jeny Conti MD     Attending:Shereen Shell MD       Hospital Day: 2    Assessment and Recommendations   Carroll Mejia is a 55 y.o. male with pmh of hypertension, depression, alcohol use disorder who presents with Abdominal pain.    Plan:   Epigastric/chest pain.  EKG no acute ischemic changes.  Stable trend of troponin.  Plan for stress test, echocardiogram.  Cardiology on consult will follow up recommendations.  Hypomagnesemia, hypokalemia.  Replaced.  Dysuria, UA no signs of infection.  No concern for UTI at this point.  History of alcohol use disorder  Depression, continue Wellbutrin  Hypertension, continue amlodipine, losartan, HCTZ        Diet Diet NPO Exceptions are: Sips of Water with Meds   DVT Prophylaxis [x] Lovenox, []  Heparin, [] SCDs, [] Ambulation,  [] Eliquis, [] Xarelto  [] Coumadin   Code Status Full Code             Personally reviewed Lab Studies and Imaging     Medical Decision Making:  The following items were considered in medical decision making:  Discussion of patient care with other providers  Reviewed clinical lab tests  Reviewed radiology tests  Reviewed other diagnostic tests/interventions  Independent review of radiologic images  Microbiology cultures and other micro tests reviewed      Subjective:     Chief Complaint:  Epigastric pain improved.  Denies nausea vomiting.  Tolerating oral intake.        Review of Systems:      Pertinent positives and negatives discussed in HPI    Objective:     Intake/Output Summary (Last 24 hours) at 2025 1340  Last data filed at 2025 2104  Gross per 24 hour   Intake 760 ml   Output --   Net 760 ml      Vitals:   Vitals:    25 0439 25 0747 25 0809 25 1220   BP: 117/78 118/77  126/86   Pulse: 61

## 2025-07-21 NOTE — PLAN OF CARE
Problem: Discharge Planning  Goal: Discharge to home or other facility with appropriate resources  7/21/2025 0932 by Ashlee Ernst RN  Outcome: Progressing  7/21/2025 0018 by Jyotsna Overton RN  Outcome: Progressing     Problem: Pain  Goal: Verbalizes/displays adequate comfort level or baseline comfort level  7/21/2025 0932 by Ashlee Ernst RN  Outcome: Progressing  7/21/2025 0018 by Jyotsna Overton RN  Outcome: Progressing     Problem: Safety - Adult  Goal: Free from fall injury  7/21/2025 0932 by Ashlee Ernst RN  Outcome: Progressing  7/21/2025 0018 by Jyotsna Overton RN  Outcome: Progressing  Flowsheets (Taken 7/20/2025 2332)  Free From Fall Injury: Instruct family/caregiver on patient safety     Problem: Neurosensory - Adult  Goal: Achieves stable or improved neurological status  Outcome: Progressing     Problem: Cardiovascular - Adult  Goal: Maintains optimal cardiac output and hemodynamic stability  Outcome: Progressing     Problem: Skin/Tissue Integrity - Adult  Goal: Skin integrity remains intact  Outcome: Progressing     Problem: Musculoskeletal - Adult  Goal: Return mobility to safest level of function  Outcome: Progressing     Problem: Metabolic/Fluid and Electrolytes - Adult  Goal: Electrolytes maintained within normal limits  Outcome: Progressing     Problem: Hematologic - Adult  Goal: Maintains hematologic stability  Outcome: Progressing

## 2025-07-21 NOTE — PLAN OF CARE
Problem: Discharge Planning  Goal: Discharge to home or other facility with appropriate resources  7/21/2025 1658 by Ashlee Ernst RN  Outcome: Adequate for Discharge  7/21/2025 0932 by Ashlee Ernst RN  Outcome: Progressing     Problem: Pain  Goal: Verbalizes/displays adequate comfort level or baseline comfort level  7/21/2025 1658 by Ashlee Ernst RN  Outcome: Adequate for Discharge  7/21/2025 0932 by Ashlee Ernst RN  Outcome: Progressing     Problem: Safety - Adult  Goal: Free from fall injury  7/21/2025 1658 by Ashlee Ernst RN  Outcome: Adequate for Discharge  7/21/2025 0932 by Ashlee Ernst RN  Outcome: Progressing     Problem: Neurosensory - Adult  Goal: Achieves stable or improved neurological status  7/21/2025 1658 by Ashlee Ernst RN  Outcome: Adequate for Discharge  7/21/2025 0932 by Ashlee Ernst RN  Outcome: Progressing     Problem: Cardiovascular - Adult  Goal: Maintains optimal cardiac output and hemodynamic stability  7/21/2025 1658 by Ashlee Ernst RN  Outcome: Adequate for Discharge  7/21/2025 0932 by Ashlee Ernst RN  Outcome: Progressing     Problem: Skin/Tissue Integrity - Adult  Goal: Skin integrity remains intact  7/21/2025 1658 by Ashlee Ernst RN  Outcome: Adequate for Discharge  7/21/2025 0932 by Ashlee Ernst RN  Outcome: Progressing     Problem: Musculoskeletal - Adult  Goal: Return mobility to safest level of function  7/21/2025 1658 by Ashlee Ernst RN  Outcome: Adequate for Discharge  7/21/2025 0932 by Ashlee Ernst RN  Outcome: Progressing     Problem: Metabolic/Fluid and Electrolytes - Adult  Goal: Electrolytes maintained within normal limits  7/21/2025 1658 by Ashlee Ernst RN  Outcome: Adequate for Discharge  7/21/2025 0932 by Ashlee Ernst RN  Outcome: Progressing     Problem: Hematologic - Adult  Goal: Maintains hematologic stability  7/21/2025 1658 by Ashlee Ernst RN  Outcome: Adequate for Discharge  7/21/2025 0932 by Klever

## 2025-07-21 NOTE — PLAN OF CARE
Problem: Discharge Planning  Goal: Discharge to home or other facility with appropriate resources  7/21/2025 0018 by Jyotsna Overton RN  Outcome: Progressing  7/20/2025 1450 by Marilee Carlton RN  Outcome: Progressing     Problem: Pain  Goal: Verbalizes/displays adequate comfort level or baseline comfort level  7/21/2025 0018 by Jyotsna Overton RN  Outcome: Progressing  7/20/2025 1450 by Marilee Carlton RN  Outcome: Progressing     Problem: Safety - Adult  Goal: Free from fall injury  Outcome: Progressing  Flowsheets (Taken 7/20/2025 2332)  Free From Fall Injury: Instruct family/caregiver on patient safety

## 2025-07-21 NOTE — DISCHARGE SUMMARY
V2.0  Discharge Summary    Name:  Carroll Mejia /Age/Sex: 1970 (55 y.o. male)   Admit Date: 2025  Discharge Date: 25    MRN & CSN:  [id number] & [id number] Encounter Date and Time 25 5:22 PM EDT    Attending:  Shereen Shell MD Discharging Provider: Shereen Shell MD       Hospital Course:     Brief HPI:Carroll Mejia is a 55 y.o. male with pmh of hypertension, depression, alcohol use disorder who presents with gastric/chest pain.  EKG no acute ischemic changes.  Troponin with stable trend.  Underwent stress test, abnormal results noted but results reviewed by cardiology and per cardiology no concerning ischemic findings on stress test.  Patient cleared for discharge from cardiology standpoint.  Epigastric/chest pain resolved.  Noted to have hypomagnesemia and hypokalemia replaced.  Reported dysuria UA no signs of infection.  Patient discharged home with follow-up recommendations with PCP    Brief Problem Based Course:   Epigastric/chest pain, cardiac etiologies ruled out.  Hypomagnesemia  Hypokalemia  Dysuria  History of alcohol use disorder  Depression  Hypertension      The patient expressed appropriate understanding of, and agreement with the discharge recommendations, medications, and plan.     Consults this admission:  IP CONSULT TO HOSPITALIST  IP CONSULT TO CARDIOLOGY    Discharge Diagnosis:   Abdominal pain      Discharge Instruction:   Follow up appointments:   Primary care physician: Jeny Conti MD within 1 week  Diet: regular diet   Activity: activity as tolerated  Disposition: Discharged to:   [x]Home, []C, []SNF, []Acute Rehab, []Hospice   Condition on discharge: Stable  Labs and Tests to be Followed up as an outpatient by PCP or Specialist:     Discharge Medications:        Medication List        CONTINUE taking these medications      amLODIPine 10 MG tablet  Commonly known as: NORVASC  Take 1 tablet by mouth daily     buPROPion 150 MG extended release

## 2025-07-21 NOTE — PROGRESS NOTES
Pt discharged. IV removed and education given by charge RN.    Electronically signed by Ashlee Ernst RN on 7/21/2025 at 5:47 PM

## 2025-07-21 NOTE — CARE COORDINATION
Discharge Planning:      (CM) reviewed the patient's chart to assess needs. Patient's Readmission Risk Score is  12% . Patient's medical insurance is  Payor: UMR / Plan: UMR / Product Type: *No Product type* / .  Patient's PCP is Jeny Conti MD .  No needs anticipated, at this time. CM team to follow. Staff to inform CM if additional discharge needs arise.    Pts preferred pharmacy is   [id number] - [place], [place] - [address] - P [phone] - F [phone] [address] [facility] [address]  Phone: [phone] Fax: [phone]    Electronically signed by Teresa Feliciano RN on 7/21/2025 at 1:26 PM

## 2025-07-21 NOTE — PROGRESS NOTES
Pt alert and oriented. Pt NPO MD Lockhart messaged about PO medications.    Electronically signed by Ashlee Ernst RN on 7/21/2025 at 7:51 AM    Pt /77. I messaged MD Lockhart to see if he wants to still give losartan and hydrochlorothiazide.    Electronically signed by Ashlee Ersnt RN on 7/21/2025 at 8:49 AM    MD Shell messaged about BP via perfect serve, per MD hold BP meds.    Electronically signed by Ashlee Ernst RN on 7/21/2025 at 9:20 AM

## 2025-07-21 NOTE — PROGRESS NOTES
Per MD Lujan pt is ready for discharge. This RN is messaging hospitalist per request of MD Lujan.    Electronically signed by Ashlee Ernst RN on 7/21/2025 at 4:52 PM

## 2025-07-21 NOTE — PROGRESS NOTES
Pharmacy Medication Reconciliation Note     List of medications patient is currently taking is complete.    Source of information:   1. Conversation with pt at bedside   2. Fill hx    [unfilled]    Notes regarding home medications:   1.  Vit D 50,000 units. Takes on Mondays  2.  Does not use inhalers at home   3.  Has a supply of all meds at home.    Deepali Umana RPH   7/21/2025  12:52 PM

## 2025-07-22 ENCOUNTER — TELEPHONE (OUTPATIENT)
Dept: PRIMARY CARE CLINIC | Age: 55
End: 2025-07-22

## 2025-07-22 NOTE — TELEPHONE ENCOUNTER
Care Transitions Initial Follow Up Call    Outreach made within 2 business days of discharge: Yes    Patient: Carroll Mejia Patient : 1970   MRN: [id number]  Reason for Admission:   Abdominal pain     Discharge Date: 25       Spoke with: Carroll    Discharge department/facility: [facility] Patient Contact:  Was patient able to fill all prescriptions: Yes  Was patient instructed to bring all medications to the follow-up visit: Yes  Is patient taking all medications as directed in the discharge summary? Yes  Does patient understand their discharge instructions: Yes  Does patient have questions or concerns that need addressed prior to 7-14 day follow up office visit: no    Additional needs identified to be addressed with provider  No needs identified             Scheduled appointment with PCP within 7-14 days    Follow Up  Future Appointments   Date Time Provider Department Center   2025  1:30 PM Jeny Conti MD [address] [facility] DEP   9/3/2025  9:30 AM Jeny Conti MD [address] [facility] DEP       Betzy Cheung MA

## 2025-07-28 ENCOUNTER — OFFICE VISIT (OUTPATIENT)
Dept: PRIMARY CARE CLINIC | Age: 55
End: 2025-07-28

## 2025-07-28 VITALS
DIASTOLIC BLOOD PRESSURE: 78 MMHG | HEIGHT: 69 IN | BODY MASS INDEX: 28.11 KG/M2 | TEMPERATURE: 98.2 F | SYSTOLIC BLOOD PRESSURE: 111 MMHG | HEART RATE: 76 BPM | WEIGHT: 189.8 LBS | OXYGEN SATURATION: 98 %

## 2025-07-28 DIAGNOSIS — Z09 HOSPITAL DISCHARGE FOLLOW-UP: ICD-10-CM

## 2025-07-28 DIAGNOSIS — R07.2 PRECORDIAL PAIN: ICD-10-CM

## 2025-07-28 DIAGNOSIS — E78.2 MIXED HYPERLIPIDEMIA: Primary | ICD-10-CM

## 2025-07-28 DIAGNOSIS — K21.00 GASTROESOPHAGEAL REFLUX DISEASE WITH ESOPHAGITIS WITHOUT HEMORRHAGE: ICD-10-CM

## 2025-07-28 DIAGNOSIS — D70.8 OTHER NEUTROPENIA: ICD-10-CM

## 2025-07-28 RX ORDER — FAMOTIDINE 20 MG/1
20 TABLET, FILM COATED ORAL 2 TIMES DAILY
Qty: 60 TABLET | Refills: 5 | Status: SHIPPED | OUTPATIENT
Start: 2025-07-28

## 2025-07-28 RX ORDER — BEMPEDOIC ACID 180 MG/1
180 TABLET, FILM COATED ORAL DAILY
Qty: 90 TABLET | Refills: 3 | Status: SHIPPED | OUTPATIENT
Start: 2025-07-28

## 2025-07-28 SDOH — ECONOMIC STABILITY: FOOD INSECURITY: WITHIN THE PAST 12 MONTHS, YOU WORRIED THAT YOUR FOOD WOULD RUN OUT BEFORE YOU GOT MONEY TO BUY MORE.: NEVER TRUE

## 2025-07-28 SDOH — ECONOMIC STABILITY: FOOD INSECURITY: WITHIN THE PAST 12 MONTHS, THE FOOD YOU BOUGHT JUST DIDN'T LAST AND YOU DIDN'T HAVE MONEY TO GET MORE.: NEVER TRUE

## 2025-07-28 ASSESSMENT — PATIENT HEALTH QUESTIONNAIRE - PHQ9
1. LITTLE INTEREST OR PLEASURE IN DOING THINGS: NOT AT ALL
SUM OF ALL RESPONSES TO PHQ QUESTIONS 1-9: 0
2. FEELING DOWN, DEPRESSED OR HOPELESS: NOT AT ALL
SUM OF ALL RESPONSES TO PHQ QUESTIONS 1-9: 0

## 2025-07-28 NOTE — PATIENT INSTRUCTIONS
[facility] - [facility] - [address] - P [phone] - F [phone]  [address] [place] [address]  Phone: [phone]  Fax: [phone]     Order Class:

## 2025-08-01 ASSESSMENT — ENCOUNTER SYMPTOMS
WHEEZING: 0
ALLERGIC/IMMUNOLOGIC NEGATIVE: 1
SORE THROAT: 0
NAUSEA: 0
COUGH: 0
RHINORRHEA: 0
VOMITING: 0
SHORTNESS OF BREATH: 0
EYES NEGATIVE: 1

## 2025-08-01 NOTE — PROGRESS NOTES
Phone: [phone]   famotidine 20 MG tablet          Medications marked \"taking\" at this time  Outpatient Medications Marked as Taking for the 7/28/25 encounter (Office Visit) with Jeny Conti MD   Medication Sig Dispense Refill    Bempedoic Acid (NEXLETOL) 180 MG TABS Take 180 mg by mouth daily 90 tablet 3    famotidine (PEPCID) 20 MG tablet Take 1 tablet by mouth 2 times daily 60 tablet 5    tadalafil (CIALIS) 10 MG tablet Take 1 tablet by mouth as needed for Erectile Dysfunction 30 tablet 3    amLODIPine (NORVASC) 10 MG tablet Take 1 tablet by mouth daily 90 tablet 1    losartan-hydroCHLOROthiazide (HYZAAR) 100-25 MG per tablet Take 1 tablet by mouth daily Stop 100/12.5 90 tablet 3    metoprolol succinate (TOPROL XL) 100 MG extended release tablet Take 1 tablet by mouth daily Not dose reduction due to wellbutrin 90 tablet 3    buPROPion (WELLBUTRIN XL) 150 MG extended release tablet Take 1 tablet by mouth every morning 30 tablet 3    vitamin D (ERGOCALCIFEROL) 1.25 MG (23272 UT) CAPS capsule TAKE 1 CAPSULE BY MOUTH ONE TIME PER WEEK 12 capsule 3        Medications patient taking as of now reconciled against medications ordered at time of hospital discharge: Yes    Review of Systems   Constitutional:  Negative for chills, fatigue and fever.   HENT:  Negative for congestion, nosebleeds, postnasal drip, rhinorrhea and sore throat.    Eyes: Negative.    Respiratory:  Negative for cough, shortness of breath and wheezing.         Patient quit smoking  Cpap for RONNY   Cardiovascular: Negative.  Negative for chest pain and palpitations.        Recent normal stress test.   Gastrointestinal:  Negative for nausea and vomiting.        Colonoscopy with Dr. White 12/2/2022 with polypectomy needs repeat 12/2/2027   Endocrine: Negative for polydipsia, polyphagia and polyuria.        Prediabetes   Musculoskeletal: Negative.  Negative for myalgias and neck pain.   Skin: Negative.  Negative for rash.

## 2025-08-06 ENCOUNTER — HOSPITAL ENCOUNTER (OUTPATIENT)
Dept: CT IMAGING | Age: 55
Discharge: HOME | End: 2025-08-06
Attending: INTERNAL MEDICINE
Payer: COMMERCIAL

## 2025-08-06 DIAGNOSIS — I10 ESSENTIAL HYPERTENSION: ICD-10-CM

## 2025-08-06 DIAGNOSIS — R07.2 PRECORDIAL PAIN: ICD-10-CM

## 2025-08-06 DIAGNOSIS — K21.00 GASTROESOPHAGEAL REFLUX DISEASE WITH ESOPHAGITIS WITHOUT HEMORRHAGE: ICD-10-CM

## 2025-08-06 LAB
ALBUMIN SERPL-MCNC: 4.1 G/DL (ref 3.4–5)
ALBUMIN/GLOB SERPL: 1.4 {RATIO} (ref 1.1–2.2)
ALP SERPL-CCNC: 45 U/L (ref 40–129)
ALT SERPL-CCNC: 33 U/L (ref 10–40)
ANION GAP SERPL CALCULATED.3IONS-SCNC: 10 MMOL/L (ref 3–16)
AST SERPL-CCNC: 34 U/L (ref 15–37)
BASOPHILS # BLD: 0 K/UL (ref 0–0.2)
BASOPHILS NFR BLD: 0.5 %
BILIRUB SERPL-MCNC: 0.6 MG/DL (ref 0–1)
BUN SERPL-MCNC: 14 MG/DL (ref 7–20)
CALCIUM SERPL-MCNC: 9.5 MG/DL (ref 8.3–10.6)
CHLORIDE SERPL-SCNC: 103 MMOL/L (ref 99–110)
CO2 SERPL-SCNC: 26 MMOL/L (ref 21–32)
CREAT SERPL-MCNC: 0.7 MG/DL (ref 0.9–1.3)
DEPRECATED RDW RBC AUTO: 14 % (ref 12.4–15.4)
EOSINOPHIL # BLD: 0.2 K/UL (ref 0–0.6)
EOSINOPHIL NFR BLD: 4.7 %
FOLATE SERPL-MCNC: 14.2 NG/ML (ref 4.78–24.2)
GFR SERPLBLD CREATININE-BSD FMLA CKD-EPI: >90 ML/MIN/{1.73_M2}
GLUCOSE SERPL-MCNC: 92 MG/DL (ref 70–99)
HCT VFR BLD AUTO: 37.4 % (ref 40.5–52.5)
HGB BLD-MCNC: 12.9 G/DL (ref 13.5–17.5)
LYMPHOCYTES # BLD: 1.8 K/UL (ref 1–5.1)
LYMPHOCYTES NFR BLD: 45.1 %
MCH RBC QN AUTO: 31.4 PG (ref 26–34)
MCHC RBC AUTO-ENTMCNC: 34.4 G/DL (ref 31–36)
MCV RBC AUTO: 91.4 FL (ref 80–100)
MONOCYTES # BLD: 0.4 K/UL (ref 0–1.3)
MONOCYTES NFR BLD: 10.4 %
NEUTROPHILS # BLD: 1.6 K/UL (ref 1.7–7.7)
NEUTROPHILS NFR BLD: 39.3 %
PLATELET # BLD AUTO: 225 K/UL (ref 135–450)
PMV BLD AUTO: 8.6 FL (ref 5–10.5)
POTASSIUM SERPL-SCNC: 3.8 MMOL/L (ref 3.5–5.1)
PROT SERPL-MCNC: 7 G/DL (ref 6.4–8.2)
RBC # BLD AUTO: 4.1 M/UL (ref 4.2–5.9)
SODIUM SERPL-SCNC: 139 MMOL/L (ref 136–145)
VIT B12 SERPL-MCNC: 820 PG/ML (ref 211–911)
WBC # BLD AUTO: 4.1 K/UL (ref 4–11)

## 2025-08-06 PROCEDURE — 75571 CT HRT W/O DYE W/CA TEST: CPT

## 2025-08-08 LAB — H PYLORI BREATH TEST: NEGATIVE
